# Patient Record
Sex: MALE | Race: OTHER | NOT HISPANIC OR LATINO | URBAN - METROPOLITAN AREA
[De-identification: names, ages, dates, MRNs, and addresses within clinical notes are randomized per-mention and may not be internally consistent; named-entity substitution may affect disease eponyms.]

---

## 2018-04-07 ENCOUNTER — INPATIENT (INPATIENT)
Age: 23
LOS: 11 days | Discharge: HOME | End: 2018-04-19
Attending: HOSPITALIST | Admitting: HOSPITALIST
Payer: MEDICAID

## 2018-04-07 VITALS
WEIGHT: 163.14 LBS | TEMPERATURE: 32 F | SYSTOLIC BLOOD PRESSURE: 132 MMHG | HEART RATE: 80 BPM | RESPIRATION RATE: 20 BRPM | OXYGEN SATURATION: 100 % | DIASTOLIC BLOOD PRESSURE: 82 MMHG

## 2018-04-07 NOTE — ED ADULT TRIAGE NOTE - CHIEF COMPLAINT QUOTE
Pt. brought in by family for intermittent episodes of being unresponsive and witnessed seizure like activity at home as well as worsening neuro symptoms. Per family, pt. began to have right sided facial paralysis, right sided neck/facial pain and swelling one week ago, seen at [facility] and diagnosed with Bell's Palsy, was given Valacyclovir and prednisone, however family states weakness and paralysis has worsened, pt. now unable to eat or swallow, speak, or make eye contact. Family also reports three witnessed episodes of patient "shaking" with his "eyes rolling back into his head" over the past 2-3 days. Pt. with notable right sided facial droop, slurred speech, c/o right mastoid pain, and midsternal chest pain, as well as inability to walk due to weakness. Dr. Bhat called for eval. no stroke code, but pt. to be seen in Main ED. Pt. brought in by family for intermittent episodes of being unresponsive and witnessed seizure like activity at home as well as worsening neuro symptoms. Per family, pt. began to have right sided facial paralysis, right sided neck/facial pain and swelling one week ago, seen at [facility] and diagnosed with Bell's Palsy, was given Valacyclovir and prednisone, however family states weakness and paralysis has worsened, pt. now unable to eat or swallow, speak, or make eye contact. Family also reports three witnessed episodes of patient "shaking" with his "eyes rolling back into his head" over the past 2-3 days. Pt. with notable right sided facial droop, slurred speech, c/o right mastoid pain, severe headache, and midsternal chest pain, as well as inability to walk due to weakness. Dr. Bhat called for eval. no stroke code, but pt. to be seen in Main ED.

## 2018-04-07 NOTE — ED PROVIDER NOTE - OBJECTIVE STATEMENT
22 M no pmh, p/w facial droop, gen weakness, headache and seizure activity. Pt started feeling unwell 1 week ago, developed L sided facial droop, with inability to close R eye. Went to [facility], treated with steroids and acyclovir for presumed bells palsy. However, patient with persistent and worsening diffuse headache and R neck pain which he has not taken anything for. Also decreased Po intake, and drooling from L side of mouth. No change in vision. No fevers. Does endorse recent febrile URI 2 months ago. Denies etoh/drug use.

## 2018-04-07 NOTE — ED PROVIDER NOTE - MEDICAL DECISION MAKING DETAILS
22 M with L facial droop, inability to close R eye, seizure activity. Exam does not follow clear neuro pattern. No infectious symptoms. Given this is 4th ED visit, will check labs, CT head, pain control, neuro c/s

## 2018-04-07 NOTE — ED PROVIDER NOTE - ATTENDING CONTRIBUTION TO CARE
21 y/o M with no significant PMH BIB family for headache.  Pt reports 1 week prior he developed a facial droop.  He was seen at an OSH at the time and diagnosed with Bell's palsy, started on steroids and acyclovir.  Pt reports sxs have worsened since.  He reports a diffuse headache and right ear pain with accompanying droop to left side of mouth and inability to close the right eye.  Pt also has been unable to eat or drink anything and now with generalized weakness x 2 days.  Family reports 3 episodes of generalized tonic-clonic activity followed by a period of unresponsiveness at home.  These episodes lasting seconds to minutes, always return to baseline, no reported injury or trauma.  No fever, chills, rash, weakness to extremities, difficulty breathing.  Pt reports he was treated for a viral infection 2 months prior, but no other recent illnesses.  Pt moved to the  from [facility] in 2014, but denies any recent travel.  (+)sexually active, no h/o STDs or infections.  Pt is lying in stretcher, awake and alert oriented x 3.  AF/VSS.  NCAT, PERRL, pt unable to fully close right eyelid, does not participate with EOM exam, but noted to spontaneously move eyes in all directions.  TM intact bilat with no effusion and normal light reflex.  (+)subjective right facial numbness, (+)L lower facial droop, but tongue is midline and gag reflex is intact.  Neck is supple with no meningismus.  Lungs cta bl.  Cards nl S1/S2, RRR, no MRG.  Abd soft ntnd.  No pedal edema or calf tenderness.  No pronator drift, strength 5/5 in all 4 extremities, sensation is grossly itnact, babinski neg.  Will plan for labs, including tox/lyme/hiv, ct head, IVF, pain meds, neuro consult.  Concern for complex migraine vs primary cns lesion vs tox vs new onset seizure disorder.  while sxs are not consistent with a focal neurologic process, reported new onset seizures are concerning and pt will likely require admission.

## 2018-04-07 NOTE — ED PROVIDER NOTE - PROGRESS NOTE DETAILS
rapid assessment: 22 y/ol male diagnosed with [place] Palsy four days ago at [facility].  Family states that he has had epsiodes of eyes rolling to back of head. He is unable to eat or drink and is stating he has headache.  He is aware of name, place and time. Lungs clear.  Transferred over to adult ED. Jacqueline MUNGUIA Home att: Called to quick look for stroke jose. 22M last nl Tues 4/2. Per friend and father 4/2 patient developed right sided facial droop, facial swelling, progressive worsening of droop, food drips out of mouth and right eye tears. Jose osh, told bell's palsy and discharged. Since Tuesday 2 generalized diaz clonic with post-ictal. Today patient had third tonic clonic of life, confused, "out of it." PE nad, perrl, decr sensation rt cheek (per patient since tues), intact str x 4 extrem, decr sensation rle (since tues). Neg code stroke, ask that patient be placed in main. Safia: neurology consulted Safia: patient seen by neurology, recommend admission for seizure workup. Pt accepted by Dr Padilla

## 2018-04-07 NOTE — ED PROVIDER NOTE - CRANIAL NERVE AND PUPILLARY EXAM
extra-ocular movements intact/L mouth droop, self correcting with distraction, EOMI, tongue midline, decreased sensation to R face, diff closing R eye

## 2018-04-08 DIAGNOSIS — R81 GLYCOSURIA: ICD-10-CM

## 2018-04-08 DIAGNOSIS — R41.89 OTHER SYMPTOMS AND SIGNS INVOLVING COGNITIVE FUNCTIONS AND AWARENESS: ICD-10-CM

## 2018-04-08 DIAGNOSIS — R29.810 FACIAL WEAKNESS: ICD-10-CM

## 2018-04-08 DIAGNOSIS — Z29.9 ENCOUNTER FOR PROPHYLACTIC MEASURES, UNSPECIFIED: ICD-10-CM

## 2018-04-08 DIAGNOSIS — R74.0 NONSPECIFIC ELEVATION OF LEVELS OF TRANSAMINASE AND LACTIC ACID DEHYDROGENASE [LDH]: ICD-10-CM

## 2018-04-08 DIAGNOSIS — D72.829 ELEVATED WHITE BLOOD CELL COUNT, UNSPECIFIED: ICD-10-CM

## 2018-04-08 DIAGNOSIS — R45.89 OTHER SYMPTOMS AND SIGNS INVOLVING EMOTIONAL STATE: ICD-10-CM

## 2018-04-08 LAB
ALBUMIN SERPL ELPH-MCNC: 3.8 G/DL — SIGNIFICANT CHANGE UP (ref 3.3–5)
ALBUMIN SERPL ELPH-MCNC: 4.4 G/DL — SIGNIFICANT CHANGE UP (ref 3.3–5)
ALP SERPL-CCNC: 67 U/L — SIGNIFICANT CHANGE UP (ref 40–120)
ALP SERPL-CCNC: 72 U/L — SIGNIFICANT CHANGE UP (ref 40–120)
ALT FLD-CCNC: 48 U/L — HIGH (ref 4–41)
ALT FLD-CCNC: 55 U/L — HIGH (ref 4–41)
AMPHET UR-MCNC: NEGATIVE — SIGNIFICANT CHANGE UP
APAP SERPL-MCNC: < 15 UG/ML — LOW (ref 15–25)
APPEARANCE UR: CLEAR — SIGNIFICANT CHANGE UP
AST SERPL-CCNC: 28 U/L — SIGNIFICANT CHANGE UP (ref 4–40)
AST SERPL-CCNC: 30 U/L — SIGNIFICANT CHANGE UP (ref 4–40)
BARBITURATES UR SCN-MCNC: POSITIVE — SIGNIFICANT CHANGE UP
BASE EXCESS BLDV CALC-SCNC: 4.2 MMOL/L — SIGNIFICANT CHANGE UP
BASOPHILS # BLD AUTO: 0.01 K/UL — SIGNIFICANT CHANGE UP (ref 0–0.2)
BASOPHILS # BLD AUTO: 0.02 K/UL — SIGNIFICANT CHANGE UP (ref 0–0.2)
BASOPHILS NFR BLD AUTO: 0.1 % — SIGNIFICANT CHANGE UP (ref 0–2)
BASOPHILS NFR BLD AUTO: 0.2 % — SIGNIFICANT CHANGE UP (ref 0–2)
BENZODIAZ UR-MCNC: NEGATIVE — SIGNIFICANT CHANGE UP
BILIRUB SERPL-MCNC: 0.3 MG/DL — SIGNIFICANT CHANGE UP (ref 0.2–1.2)
BILIRUB SERPL-MCNC: 0.3 MG/DL — SIGNIFICANT CHANGE UP (ref 0.2–1.2)
BILIRUB UR-MCNC: NEGATIVE — SIGNIFICANT CHANGE UP
BLOOD GAS VENOUS - CREATININE: 0.94 MG/DL — SIGNIFICANT CHANGE UP (ref 0.5–1.3)
BLOOD UR QL VISUAL: NEGATIVE — SIGNIFICANT CHANGE UP
BUN SERPL-MCNC: 10 MG/DL — SIGNIFICANT CHANGE UP (ref 7–23)
BUN SERPL-MCNC: 10 MG/DL — SIGNIFICANT CHANGE UP (ref 7–23)
CALCIUM SERPL-MCNC: 8.4 MG/DL — SIGNIFICANT CHANGE UP (ref 8.4–10.5)
CALCIUM SERPL-MCNC: 9.2 MG/DL — SIGNIFICANT CHANGE UP (ref 8.4–10.5)
CANNABINOIDS UR-MCNC: NEGATIVE — SIGNIFICANT CHANGE UP
CHLORIDE BLDV-SCNC: 107 MMOL/L — SIGNIFICANT CHANGE UP (ref 96–108)
CHLORIDE SERPL-SCNC: 102 MMOL/L — SIGNIFICANT CHANGE UP (ref 98–107)
CHLORIDE SERPL-SCNC: 104 MMOL/L — SIGNIFICANT CHANGE UP (ref 98–107)
CK SERPL-CCNC: 103 U/L — SIGNIFICANT CHANGE UP (ref 30–200)
CO2 SERPL-SCNC: 27 MMOL/L — SIGNIFICANT CHANGE UP (ref 22–31)
CO2 SERPL-SCNC: 27 MMOL/L — SIGNIFICANT CHANGE UP (ref 22–31)
COCAINE METAB.OTHER UR-MCNC: NEGATIVE — SIGNIFICANT CHANGE UP
COLOR SPEC: SIGNIFICANT CHANGE UP
CREAT SERPL-MCNC: 0.98 MG/DL — SIGNIFICANT CHANGE UP (ref 0.5–1.3)
CREAT SERPL-MCNC: 0.99 MG/DL — SIGNIFICANT CHANGE UP (ref 0.5–1.3)
EOSINOPHIL # BLD AUTO: 0.01 K/UL — SIGNIFICANT CHANGE UP (ref 0–0.5)
EOSINOPHIL # BLD AUTO: 0.04 K/UL — SIGNIFICANT CHANGE UP (ref 0–0.5)
EOSINOPHIL NFR BLD AUTO: 0.1 % — SIGNIFICANT CHANGE UP (ref 0–6)
EOSINOPHIL NFR BLD AUTO: 0.4 % — SIGNIFICANT CHANGE UP (ref 0–6)
ETHANOL BLD-MCNC: < 10 MG/DL — SIGNIFICANT CHANGE UP
GAS PNL BLDV: 139 MMOL/L — SIGNIFICANT CHANGE UP (ref 136–146)
GLUCOSE BLDV-MCNC: 85 — SIGNIFICANT CHANGE UP (ref 70–99)
GLUCOSE SERPL-MCNC: 86 MG/DL — SIGNIFICANT CHANGE UP (ref 70–99)
GLUCOSE SERPL-MCNC: 87 MG/DL — SIGNIFICANT CHANGE UP (ref 70–99)
GLUCOSE UR-MCNC: SIGNIFICANT CHANGE UP
HBA1C BLD-MCNC: 6.1 % — HIGH (ref 4–5.6)
HCO3 BLDV-SCNC: 26 MMOL/L — SIGNIFICANT CHANGE UP (ref 20–27)
HCT VFR BLD CALC: 43.6 % — SIGNIFICANT CHANGE UP (ref 39–50)
HCT VFR BLD CALC: 46.2 % — SIGNIFICANT CHANGE UP (ref 39–50)
HCT VFR BLDV CALC: 49.1 % — SIGNIFICANT CHANGE UP (ref 39–51)
HGB BLD-MCNC: 14.2 G/DL — SIGNIFICANT CHANGE UP (ref 13–17)
HGB BLD-MCNC: 15.2 G/DL — SIGNIFICANT CHANGE UP (ref 13–17)
HGB BLDV-MCNC: 16 G/DL — SIGNIFICANT CHANGE UP (ref 13–17)
HIV1 AG SER QL: SIGNIFICANT CHANGE UP
HIV1+2 AB SPEC QL: SIGNIFICANT CHANGE UP
IMM GRANULOCYTES # BLD AUTO: 0.03 # — SIGNIFICANT CHANGE UP
IMM GRANULOCYTES # BLD AUTO: 0.03 # — SIGNIFICANT CHANGE UP
IMM GRANULOCYTES NFR BLD AUTO: 0.2 % — SIGNIFICANT CHANGE UP (ref 0–1.5)
IMM GRANULOCYTES NFR BLD AUTO: 0.3 % — SIGNIFICANT CHANGE UP (ref 0–1.5)
KETONES UR-MCNC: NEGATIVE — SIGNIFICANT CHANGE UP
LACTATE BLDV-MCNC: 1.3 MMOL/L — SIGNIFICANT CHANGE UP (ref 0.5–2)
LEUKOCYTE ESTERASE UR-ACNC: SIGNIFICANT CHANGE UP
LYMPHOCYTES # BLD AUTO: 2.86 K/UL — SIGNIFICANT CHANGE UP (ref 1–3.3)
LYMPHOCYTES # BLD AUTO: 22.8 % — SIGNIFICANT CHANGE UP (ref 13–44)
LYMPHOCYTES # BLD AUTO: 36.1 % — SIGNIFICANT CHANGE UP (ref 13–44)
LYMPHOCYTES # BLD AUTO: 4.06 K/UL — HIGH (ref 1–3.3)
MAGNESIUM SERPL-MCNC: 2 MG/DL — SIGNIFICANT CHANGE UP (ref 1.6–2.6)
MCHC RBC-ENTMCNC: 29.2 PG — SIGNIFICANT CHANGE UP (ref 27–34)
MCHC RBC-ENTMCNC: 29.2 PG — SIGNIFICANT CHANGE UP (ref 27–34)
MCHC RBC-ENTMCNC: 32.6 % — SIGNIFICANT CHANGE UP (ref 32–36)
MCHC RBC-ENTMCNC: 32.9 % — SIGNIFICANT CHANGE UP (ref 32–36)
MCV RBC AUTO: 88.8 FL — SIGNIFICANT CHANGE UP (ref 80–100)
MCV RBC AUTO: 89.5 FL — SIGNIFICANT CHANGE UP (ref 80–100)
METHADONE UR-MCNC: NEGATIVE — SIGNIFICANT CHANGE UP
MONOCYTES # BLD AUTO: 1.01 K/UL — HIGH (ref 0–0.9)
MONOCYTES # BLD AUTO: 1.2 K/UL — HIGH (ref 0–0.9)
MONOCYTES NFR BLD AUTO: 9 % — SIGNIFICANT CHANGE UP (ref 2–14)
MONOCYTES NFR BLD AUTO: 9.6 % — SIGNIFICANT CHANGE UP (ref 2–14)
MUCOUS THREADS # UR AUTO: SIGNIFICANT CHANGE UP
NEUTROPHILS # BLD AUTO: 6.08 K/UL — SIGNIFICANT CHANGE UP (ref 1.8–7.4)
NEUTROPHILS # BLD AUTO: 8.45 K/UL — HIGH (ref 1.8–7.4)
NEUTROPHILS NFR BLD AUTO: 54 % — SIGNIFICANT CHANGE UP (ref 43–77)
NEUTROPHILS NFR BLD AUTO: 67.2 % — SIGNIFICANT CHANGE UP (ref 43–77)
NITRITE UR-MCNC: NEGATIVE — SIGNIFICANT CHANGE UP
NON-SQ EPI CELLS # UR AUTO: <1 — SIGNIFICANT CHANGE UP
NRBC # FLD: 0 — SIGNIFICANT CHANGE UP
NRBC # FLD: 0 — SIGNIFICANT CHANGE UP
OPIATES UR-MCNC: NEGATIVE — SIGNIFICANT CHANGE UP
OXYCODONE UR-MCNC: NEGATIVE — SIGNIFICANT CHANGE UP
PCO2 BLDV: 50 MMHG — SIGNIFICANT CHANGE UP (ref 41–51)
PCP UR-MCNC: NEGATIVE — SIGNIFICANT CHANGE UP
PH BLDV: 7.38 PH — SIGNIFICANT CHANGE UP (ref 7.32–7.43)
PH UR: 6.5 — SIGNIFICANT CHANGE UP (ref 4.6–8)
PHOSPHATE SERPL-MCNC: 2.7 MG/DL — SIGNIFICANT CHANGE UP (ref 2.5–4.5)
PLATELET # BLD AUTO: 221 K/UL — SIGNIFICANT CHANGE UP (ref 150–400)
PLATELET # BLD AUTO: 241 K/UL — SIGNIFICANT CHANGE UP (ref 150–400)
PMV BLD: 11.5 FL — SIGNIFICANT CHANGE UP (ref 7–13)
PMV BLD: 11.8 FL — SIGNIFICANT CHANGE UP (ref 7–13)
PO2 BLDV: 32 MMHG — LOW (ref 35–40)
POTASSIUM BLDV-SCNC: 3.9 MMOL/L — SIGNIFICANT CHANGE UP (ref 3.4–4.5)
POTASSIUM SERPL-MCNC: 3.8 MMOL/L — SIGNIFICANT CHANGE UP (ref 3.5–5.3)
POTASSIUM SERPL-MCNC: 4.1 MMOL/L — SIGNIFICANT CHANGE UP (ref 3.5–5.3)
POTASSIUM SERPL-SCNC: 3.8 MMOL/L — SIGNIFICANT CHANGE UP (ref 3.5–5.3)
POTASSIUM SERPL-SCNC: 4.1 MMOL/L — SIGNIFICANT CHANGE UP (ref 3.5–5.3)
PROT SERPL-MCNC: 6.3 G/DL — SIGNIFICANT CHANGE UP (ref 6–8.3)
PROT SERPL-MCNC: 7.1 G/DL — SIGNIFICANT CHANGE UP (ref 6–8.3)
PROT UR-MCNC: NEGATIVE MG/DL — SIGNIFICANT CHANGE UP
RBC # BLD: 4.87 M/UL — SIGNIFICANT CHANGE UP (ref 4.2–5.8)
RBC # BLD: 5.2 M/UL — SIGNIFICANT CHANGE UP (ref 4.2–5.8)
RBC # FLD: 11.7 % — SIGNIFICANT CHANGE UP (ref 10.3–14.5)
RBC # FLD: 11.8 % — SIGNIFICANT CHANGE UP (ref 10.3–14.5)
RBC CASTS # UR COMP ASSIST: HIGH (ref 0–?)
SALICYLATES SERPL-MCNC: < 5 MG/DL — LOW (ref 15–30)
SAO2 % BLDV: 56.7 % — LOW (ref 60–85)
SODIUM SERPL-SCNC: 141 MMOL/L — SIGNIFICANT CHANGE UP (ref 135–145)
SODIUM SERPL-SCNC: 143 MMOL/L — SIGNIFICANT CHANGE UP (ref 135–145)
SP GR SPEC: 1.01 — SIGNIFICANT CHANGE UP (ref 1–1.04)
T4 AB SER-ACNC: 8.07 UG/DL — SIGNIFICANT CHANGE UP (ref 5.1–13)
TSH SERPL-MCNC: 5.46 UIU/ML — HIGH (ref 0.27–4.2)
UROBILINOGEN FLD QL: 1 MG/DL — SIGNIFICANT CHANGE UP
WBC # BLD: 11.24 K/UL — HIGH (ref 3.8–10.5)
WBC # BLD: 12.56 K/UL — HIGH (ref 3.8–10.5)
WBC # FLD AUTO: 11.24 K/UL — HIGH (ref 3.8–10.5)
WBC # FLD AUTO: 12.56 K/UL — HIGH (ref 3.8–10.5)
WBC UR QL: SIGNIFICANT CHANGE UP (ref 0–?)

## 2018-04-08 PROCEDURE — 12345: CPT | Mod: NC

## 2018-04-08 PROCEDURE — 70450 CT HEAD/BRAIN W/O DYE: CPT | Mod: 26

## 2018-04-08 PROCEDURE — 70551 MRI BRAIN STEM W/O DYE: CPT | Mod: 26

## 2018-04-08 PROCEDURE — 99223 1ST HOSP IP/OBS HIGH 75: CPT | Mod: GC

## 2018-04-08 RX ORDER — METOCLOPRAMIDE HCL 10 MG
10 TABLET ORAL ONCE
Qty: 0 | Refills: 0 | Status: COMPLETED | OUTPATIENT
Start: 2018-04-08 | End: 2018-04-08

## 2018-04-08 RX ORDER — ACETAMINOPHEN 500 MG
1000 TABLET ORAL ONCE
Qty: 0 | Refills: 0 | Status: COMPLETED | OUTPATIENT
Start: 2018-04-08 | End: 2018-04-08

## 2018-04-08 RX ORDER — TRAMADOL HYDROCHLORIDE 50 MG/1
25 TABLET ORAL EVERY 6 HOURS
Qty: 0 | Refills: 0 | Status: DISCONTINUED | OUTPATIENT
Start: 2018-04-08 | End: 2018-04-15

## 2018-04-08 RX ORDER — ACETAMINOPHEN 500 MG
650 TABLET ORAL EVERY 6 HOURS
Qty: 0 | Refills: 0 | Status: DISCONTINUED | OUTPATIENT
Start: 2018-04-08 | End: 2018-04-08

## 2018-04-08 RX ORDER — VALACYCLOVIR 500 MG/1
1000 TABLET, FILM COATED ORAL THREE TIMES A DAY
Qty: 0 | Refills: 0 | Status: COMPLETED | OUTPATIENT
Start: 2018-04-08 | End: 2018-04-11

## 2018-04-08 RX ORDER — SODIUM CHLORIDE 9 MG/ML
1000 INJECTION INTRAMUSCULAR; INTRAVENOUS; SUBCUTANEOUS ONCE
Qty: 0 | Refills: 0 | Status: COMPLETED | OUTPATIENT
Start: 2018-04-08 | End: 2018-04-08

## 2018-04-08 RX ORDER — ACETAMINOPHEN 500 MG
650 TABLET ORAL EVERY 6 HOURS
Qty: 0 | Refills: 0 | Status: DISCONTINUED | OUTPATIENT
Start: 2018-04-08 | End: 2018-04-19

## 2018-04-08 RX ADMIN — Medication 1000 MILLIGRAM(S): at 02:40

## 2018-04-08 RX ADMIN — VALACYCLOVIR 1000 MILLIGRAM(S): 500 TABLET, FILM COATED ORAL at 15:10

## 2018-04-08 RX ADMIN — TRAMADOL HYDROCHLORIDE 25 MILLIGRAM(S): 50 TABLET ORAL at 08:36

## 2018-04-08 RX ADMIN — Medication 400 MILLIGRAM(S): at 02:10

## 2018-04-08 RX ADMIN — Medication 650 MILLIGRAM(S): at 08:11

## 2018-04-08 RX ADMIN — Medication 650 MILLIGRAM(S): at 13:50

## 2018-04-08 RX ADMIN — TRAMADOL HYDROCHLORIDE 25 MILLIGRAM(S): 50 TABLET ORAL at 09:30

## 2018-04-08 RX ADMIN — Medication 1 DROP(S): at 18:00

## 2018-04-08 RX ADMIN — Medication 1 DROP(S): at 15:10

## 2018-04-08 RX ADMIN — Medication 650 MILLIGRAM(S): at 14:45

## 2018-04-08 RX ADMIN — Medication 650 MILLIGRAM(S): at 07:11

## 2018-04-08 RX ADMIN — Medication 40 MILLIGRAM(S): at 07:11

## 2018-04-08 RX ADMIN — Medication 10 MILLIGRAM(S): at 02:10

## 2018-04-08 RX ADMIN — SODIUM CHLORIDE 1000 MILLILITER(S): 9 INJECTION INTRAMUSCULAR; INTRAVENOUS; SUBCUTANEOUS at 02:10

## 2018-04-08 RX ADMIN — TRAMADOL HYDROCHLORIDE 25 MILLIGRAM(S): 50 TABLET ORAL at 21:06

## 2018-04-08 RX ADMIN — TRAMADOL HYDROCHLORIDE 25 MILLIGRAM(S): 50 TABLET ORAL at 22:06

## 2018-04-08 NOTE — PROGRESS NOTE ADULT - PROBLEM SELECTOR PLAN 4
- Patient noted to have a leukocytosis on presentation (WBC = 12.56)  - no signs of infection  - likely in setting of steroid use   - Monitor CBC

## 2018-04-08 NOTE — PROGRESS NOTE ADULT - PROBLEM SELECTOR PLAN 2
- Patient reported to have 3 episodes of whole-body shaking followed by unresponsiveness over the past 2 days lasting 20-25 seconds  - no evidence of inconitnence or tongue biting  -  Will check a vEEG for signs of epileptiform changes  - Will check Brain MRI as noted above  - neuro recs appreciated - Patient reported to have 3 episodes of whole-body shaking followed by unresponsiveness over the past 2 days lasting 20-25 seconds  - patient reports he remembers 1 episode of shaking in which he felt unwell and felt he was febrile  - no evidence of incontinence or tongue biting  - Will check a vEEG for signs of epileptiform changes  - Will check Brain MRI as noted above  - neuro recs appreciated  - possible pseuodseizure

## 2018-04-08 NOTE — PROGRESS NOTE ADULT - ASSESSMENT
22M with no PMHx who presents to the ED with L-sided facial droop possibly 2/2 Bell's Palsy vs conversion disorder, also with recent seizure-like activity concerning for epilepsy vs pseudoseizure.

## 2018-04-08 NOTE — H&P ADULT - PROBLEM SELECTOR PLAN 5
- DVT PPx: Holding pharmacologic AC as patient is ambulatory and is a low risk for DVT. - Patient noted to have an elevated ALT (55) on presentation. Patient endorses some nausea, but no other abdominal complaints. Unclear etiology at this time.  - Will monitor. If LFTs were to worsen, will check RUQ U/S

## 2018-04-08 NOTE — H&P ADULT - PROBLEM SELECTOR PLAN 4
- Patient noted to have an elevated ALT (55) on presentation. Patient endorses some nausea, but no other abdominal complaints. Unclear etiology at this time.  - Will monitor. If LFTs were to worsen, will check RUQ U/S - Patient noted to have a leukocytosis on presentation (WBC = 12.56). Suspect WBC elevation is 2/2 recent steroid use. Patient is currently afebrile and without signs/symptoms of infection. Will monitor off antibiotics for now  - Monitor CBC

## 2018-04-08 NOTE — CONSULT NOTE ADULT - ASSESSMENT
22 M no pmh, p/w for eval of facial droop, gen weakness, headache and seizure activity. As per patient, he started feeling unwell 1 week ago, developed L sided facial droop, with inability to close R eye. Went to [facility], treated with steroids and acyclovir for presumed bells palsy. Patient also c/o of numbness on right sided of the body along with right side face. As per family members, he had 3 seizures during last week, each episodes lasting for about 15-20 seconds. No h/o of tongue biting, urine/stool incontinence. Uncle noticed one seizure like episodes and he mentioned that his head and upper body were shaking more compare to lower body. His headache is intermittent, occipital and resolved quickly without intervention. His symptoms of unable to close his right eye and facial droop on left disappeared during distracting him for certain task. Neurology examination was unremarkable as deficits were absent.     Impression     Conversion disorder vs Factitious vs Malingering for  facial symptoms    Seizure like activity could be Epilepsy vs Pseudoseizure     Plan     Considering not able to witness any seizure like activity during ED presentation, I would recommend inpatient VEEG monitoring for 24 hours and MRI brain.   Psych consult once we definitely r/o Epilepsy (after VEEG and MRI brain)   No need for AEDs.   Avoid benzo for seizure like activity, give only if lasts more than 3 minutes

## 2018-04-08 NOTE — ED ADULT NURSE NOTE - CHPI ED SYMPTOMS NEG
no change in level of consciousness/no blurred vision/no weakness/no dizziness/no nausea/no numbness/no vomiting/no confusion/no fever

## 2018-04-08 NOTE — H&P ADULT - NSHPSOCIALHISTORY_GEN_ALL_CORE
Denies smoking, EtOH, or illicit drug use  Lives at home with Aunt and cousins  Works as an [employer]   Originally from [facility] [place], [place] to [place] in [place]

## 2018-04-08 NOTE — H&P ADULT - PROBLEM SELECTOR PLAN 2
- Patient reported to have 3 episodes of whole-body shaking followed by unresponsiveness. No associated tongue biting or bowel/urinary incontinence. Given variety of neurologic symptoms reported, concern for possible epilepsy  - Will check a vEEG  - Will check Brain MRI as noted above  - Follow-up Neuro recs - Patient reported to have 3 episodes of whole-body shaking followed by unresponsiveness over the past 2 days. No associated tongue biting or bowel/urinary incontinence. Given variety of neurologic symptoms reported, concern for possible epilepsy  - Will check a vEEG  - Will check Brain MRI as noted above  - Follow-up Neuro recs - Patient reported to have 3 episodes of whole-body shaking followed by unresponsiveness over the past 2 days. (Family thinks shaking somewhat similar to that demonstrated during fever).  No associated tongue biting or bowel/urinary incontinence. Given variety of neurologic symptoms reported, concern for possible epilepsy  - Will check a vEEG  - Will check Brain MRI as noted above  - Follow-up Neuro recs

## 2018-04-08 NOTE — PATIENT PROFILE ADULT. - FUNCTIONAL SCREEN CURRENT LEVEL: COMMUNICATION, MLM
(2) difficulty speaking (not related to language barrier) Pt has partial face paralysis/(2) difficulty speaking (not related to language barrier)

## 2018-04-08 NOTE — H&P ADULT - HISTORY OF PRESENT ILLNESS
Patient is a 23 y/o M w/ no significant PMHx who presents to the ED with L-sided facial droop and recent seizure-like activity. Patient is accompanied by his uncle at bedside who helped provide history. Patient reports that he was in his usual state of until 4-days ago when he developed sudden-onset L-sided facial droop as well as inability to close his R eye. He reports that he was unable to eat/drink due to his droop which concerned him and so he went to [facility] the following day for further evaluation. Patient was treated with Prednisone and Valtrex for presumed Bell's Palsy and he was discharged home. After discharge, patient remained concerned about his symptoms, and he ultimately went to [facility] 2 separate times for further evaluation. He was presumed to have Bell's Palsy at [facility] as well and was given the same treatment. Patient reports that over the past 2 days, he has been having a severe, diffuse headache as well has R neck pain. He reports numbness on the R side of his face as well. In addition, per patient's uncle, patient has had 3 separate episodes of seizure-like activity over the past 2 days. Patient reportedly shook for 20-25 seconds which was followed a brief episode of unresponsiveness. No reported bowel/urinary incontinence or tongue biting during these episodes. Since patient's symptoms appeared to be worsening, he came to [facility] for further evaluation. Patient states that he had a fever and cough ~ 2 months ago. He explains that he was given 2 antibiotics by his PMD at that time, but he is unsure what those antibiotics were. No further episodes of fever. On ROS, patient endorses poor PO intake, occasional nausea, and intermittent "burning chest pain," but denies any shortness of breath, vomiting, diarrhea, dysuria, or rash. No recent sick contacts.    In the ED, patient's VS were: T = 98.4F, HR = 90, BP = 120/75, RR = 16, O2 Sat = 99% on RA. Labs were significant for a leukocytosis of 12.56, ALT = 55, lactate of 1.3, UTox + for barbiturates, and a negative HIV screen. Head CT showed no acute pathology. Patient was given 1L NS, IV Tylenol 1g x1, and IV Reglan 10mg x1. He was then admitted to Medicine for further management. Patient is a 21 y/o M w/ no significant PMHx who presents to the ED with L-sided facial droop and recent seizure-like activity. Patient is accompanied by his uncle at bedside who helped provide history. Patient reports that he was in his usual state of until 4-days ago when he developed sudden-onset L-sided facial droop as well as inability to close his R eye. He reports that he was unable to eat/drink due to his droop which concerned him and so he went to [facility] the following day for further evaluation. Patient was treated with Prednisone and Valtrex for presumed Bell's Palsy and he was discharged home. After discharge, patient remained concerned about his symptoms, and he ultimately went to [facility] 2 separate times for further evaluation. He was presumed to have Bell's Palsy at [facility] as well and was given the same treatment. Patient reports that over the past 2 days, he has been having a severe, diffuse headache as well has R neck pain. He reports numbness on the R side of his face as well. In addition, per patient's uncle, patient has had 3 separate episodes of seizure-like activity over the past 2 days. Patient's whole body reportedly shook for 20-25 seconds each time which was followed a brief episode of unresponsiveness. No reported bowel/urinary incontinence or tongue biting during these episodes. Since patient's symptoms appeared to be worsening, he came to [facility] for further evaluation. Patient states that he had a fever and cough ~ 2 months ago. He explains that he was given 2 antibiotics by his PMD at that time, but he is unsure what those antibiotics were. No further episodes of fever. On ROS, patient endorses poor PO intake, occasional nausea, and intermittent "burning chest pain," but denies any shortness of breath, vomiting, diarrhea, dysuria, or rash. No recent sick contacts.    In the ED, patient's VS were: T = 98.4F, HR = 90, BP = 120/75, RR = 16, O2 Sat = 99% on RA. Labs were significant for a leukocytosis of 12.56, ALT = 55, lactate of 1.3, UTox + for barbiturates, and a negative HIV screen. Head CT showed no acute pathology. Patient was given 1L NS, IV Tylenol 1g x1, and IV Reglan 10mg x1. He was then admitted to Medicine for further management.

## 2018-04-08 NOTE — PROGRESS NOTE ADULT - PROBLEM SELECTOR PLAN 1
- presents with  4-day history of sudden-onset L facial droop a/w R-facial numbness, inability to close R-eye, and severe headache.  - s/p evaluation at [facility] and [place] and was started on Prednisone/Valtrex for presumed Bell's Palsy  - CT at [facility] is negative  - per neurology- likely conversion disorder, however will need to rule out organic etiology  - will obtain MRI and video EEG  - patient's facial deficits disappeared when he was distracted   - Will c/w Prednisone and Valtrex for now pending Brain MRI  - U-tox for barbiturates positivity may be false positive in the setting of NSAIDs at home; motrin, ibuprofen  - Follow-up with Neurology in the AM. - presents with  4-day history of sudden-onset L facial droop a/w R-facial numbness, inability to close R-eye, and severe headache.  - s/p evaluation at [facility] and [place] and was started on Prednisone/Valtrex for presumed Bell's Palsy  - CTH is negative  - per neurology- likely conversion disorder, however will need to rule out organic etiology  - will obtain MRI and video EEG  - patient's facial deficits disappeared when he was distracted   - Will c/w Prednisone and Valtrex for now pending Brain MRI  - U-tox for barbiturates positivity may be false positive in the setting of NSAIDs at home; motrin, ibuprofen  - Follow-up with Neurology

## 2018-04-08 NOTE — CONSULT NOTE ADULT - SUBJECTIVE AND OBJECTIVE BOX
HPI:    22 M no pmh, p/w for eval of facial droop, gen weakness, headache and seizure activity. As per patient, he started feeling unwell 1 week ago, developed L sided facial droop, with inability to close R eye. Went to [facility], treated with steroids and acyclovir for presumed bells palsy. However, patient with persistent and worsening diffuse headache and R neck pain which he has not taken anything for. No change in vision. No fevers. Does endorse recent febrile URI 2 months ago. Denies etoh/drug use. Patient also c/o of numbness on right sided of the body along with right side face. As per family members, he had 3 seizures during last week, each episodes lasting for about 15-20 seconds. No h/o of tongue biting, urine/stool incontinence. Uncle noticed one seizure like episodes and he mentioned that his head and upper body were shaking more compare to lower body. His headache is intermittent, occipital and resolved quickly without intervention. His symptoms of unable to close his right eye and facial droop on left disappeared during distracting him for certain task.            MEDICATIONS  (STANDING):  acetaminophen  IVPB. 1000 milliGRAM(s) IV Intermittent once  metoclopramide Injectable 10 milliGRAM(s) IV Push once  sodium chloride 0.9% Bolus 1000 milliLiter(s) IV Bolus once    MEDICATIONS  (PRN):      PAST MEDICAL & SURGICAL HISTORY:  No pertinent past medical history  No significant past surgical history      FAMILY HISTORY:      Allergies    No Known Allergies    Intolerances          SHx - No smoking, No ETOH, No drug abuse      Review of Systems:  CONSTITUTIONAL:  No weight loss, fever, chills, weakness or fatigue.  HEENT:  Eyes:  No visual loss, blurred vision, double vision or yellow sclerae. Ears, Nose, Throat:  No hearing loss, sneezing, congestion, runny nose or sore throat.  SKIN:  No rash or itching.  CARDIOVASCULAR:  No chest pain, chest pressure or chest discomfort. No palpitations or edema.  RESPIRATORY:  No shortness of breath, cough or sputum.  GASTROINTESTINAL:  No anorexia, nausea, vomiting or diarrhea. No abdominal pain or blood.  GENITOURINARY:  NO Burning on urination.   NEUROLOGICAL: See HPI  MUSCULOSKELETAL:  No muscle, back pain, joint pain or stiffness.  HEMATOLOGIC:  No anemia, bleeding or bruising.  LYMPHATICS:  No enlarged nodes. No history of splenectomy.  PSYCHIATRIC:  No history of depression or anxiety.  ENDOCRINOLOGIC:  No reports of sweating, cold or heat intolerance. No polyuria or polydipsia.  ALLERGIES:  No history of asthma, hives, eczema or rhinitis.        Vital Signs Last 24 Hrs  T(C): 36.9 (07 Apr 2018 23:35), Max: 36.9 (07 Apr 2018 23:35)  T(F): 98.4 (07 Apr 2018 23:35), Max: 98.4 (07 Apr 2018 23:35)  HR: 90 (07 Apr 2018 23:35) (80 - 90)  BP: 120/75 (07 Apr 2018 23:35) (120/75 - 132/82)  BP(mean): --  RR: 16 (07 Apr 2018 23:35) (16 - 20)  SpO2: 99% (07 Apr 2018 23:35) (99% - 100%)    General Exam:   General appearance: No acute distress                   Neurological Exam:    Mental Status: Orientated to self, date and place.  Attention intact.  No dysarthria, aphasia or neglect.  Cranial Nerves: PERRL, EOMI, subjective numbness on right side- but also felt vibration less on right (conducted via bone and should be equal on both side). facial asymmetry - disappear during distraction , Tongue, uvula and palate midline.    Motor:   Tone: normal.                  Strength:   intact   Pronator drift: none                 Dysmetria: None to finger-nose-finger  No truncal ataxia.    Tremor: No resting, postural or action tremor,  No myoclonus.  Sensation: intact to light touch b/l   Deep Tendon Reflexes: 2+ bilateral biceps, triceps, brachioradialis, knee and ankle  Toes flexor bilaterally  Gait: normal and stable.      Other:                          15.2   12.56 )-----------( 241      ( 08 Apr 2018 01:25 )             46.2       Radiology    CT head     < from: CT Head No Cont (04.08.18 @ 01:02) >  IMPRESSION:   No CT evidence of acute intracranial hemorrhage, mass or infarct.     < end of copied text >

## 2018-04-08 NOTE — PROGRESS NOTE ADULT - PROBLEM SELECTOR PLAN 6
- Trace glucose in urine  - On steroid PTA for suspected Bell's Palsy; continued  - Family history of diabetes mellitus (both parents)  - f/u HgbA1c level in the AM - Trace glucose in urine  - On steroid PTA for suspected Bell's Palsy; continued  - Family history of diabetes mellitus (both parents)  - A1c 6.1

## 2018-04-08 NOTE — H&P ADULT - NSHPPHYSICALEXAM_GEN_ALL_CORE
Vital Signs Last 24 Hrs  T(C): 36.7 (08 Apr 2018 02:19), Max: 36.9 (07 Apr 2018 23:35)  T(F): 98.1 (08 Apr 2018 02:19), Max: 98.4 (07 Apr 2018 23:35)  HR: 85 (08 Apr 2018 03:27) (65 - 90)  BP: 121/67 (08 Apr 2018 03:27) (120/75 - 132/82)  BP(mean): --  RR: 16 (08 Apr 2018 03:27) (16 - 20)  SpO2: 100% (08 Apr 2018 03:27) (99% - 100%)    PHYSICAL EXAM:  Constitutional:  Eyes:  ENMT:  Neck:  Breasts:  Back:  Respiratory:  Cardiovascular:  Gastrointestinal:  Genitourinary:  Rectal:  Extremities:  Vascular:  Neurological:  Skin:  Lymph Nodes:  Musculoskeletal:  Psychiatric: Vital Signs Last 24 Hrs  T(C): 36.7 (08 Apr 2018 02:19), Max: 36.9 (07 Apr 2018 23:35)  T(F): 98.1 (08 Apr 2018 02:19), Max: 98.4 (07 Apr 2018 23:35)  HR: 85 (08 Apr 2018 03:27) (65 - 90)  BP: 121/67 (08 Apr 2018 03:27) (120/75 - 132/82)  BP(mean): --  RR: 16 (08 Apr 2018 03:27) (16 - 20)  SpO2: 100% (08 Apr 2018 03:27) (99% - 100%)    PHYSICAL EXAM:  Constitutional: NAD  HEENT: NC/AT, Slightly dry mucous membranes  Neck: Supple  Respiratory: CTAB; No wheeze or rhonchi appreciated  Cardiovascular: RRR; +S1/S2  Gastrointestinal: +BS, Soft, NT, No rigidity  Genitourinary: No [place], No suprapubic TTP  Extremities: No peripheral edema  Neurological: A+Ox3, L-sided facial droop which would improve somewhat when patient was distracted, PERRL, EOMI, Good finger-to-nose bilaterally, Moves all extremities, Subjective decreased sensation of R face, RUE, and RLE compared to L side  Skin: Warm and dry  Psychiatric: Flat affect

## 2018-04-08 NOTE — H&P ADULT - NSHPLABSRESULTS_GEN_ALL_CORE
CBC Full  -  ( 2018 01:25 )  WBC Count : 12.56 K/uL  Hemoglobin : 15.2 g/dL  Hematocrit : 46.2 %  Platelet Count - Automated : 241 K/uL  Mean Cell Volume : 88.8 fL  Mean Cell Hemoglobin : 29.2 pg  Mean Cell Hemoglobin Concentration : 32.9 %  Auto Neutrophil # : 8.45 K/uL  Auto Lymphocyte # : 2.86 K/uL  Auto Monocyte # : 1.20 K/uL  Auto Eosinophil # : 0.01 K/uL  Auto Basophil # : 0.01 K/uL  Auto Neutrophil % : 67.2 %  Auto Lymphocyte % : 22.8 %  Auto Monocyte % : 9.6 %  Auto Eosinophil % : 0.1 %  Auto Basophil % : 0.1 %        141  |  102  |  10  ----------------------------<  87  4.1   |  27  |  0.98    Ca    9.2      2018 01:25    TPro  7.1  /  Alb  4.4  /  TBili  0.3  /  DBili  x   /  AST  30  /  ALT  55<H>  /  AlkPhos  72  04-08    Urinalysis Basic - ( 2018 02:00 )    Color: PLYEL / Appearance: CLEAR / S.015 / pH: 6.5  Gluc: TRACE / Ketone: NEGATIVE  / Bili: NEGATIVE / Urobili: 1 mg/dL   Blood: NEGATIVE / Protein: NEGATIVE mg/dL / Nitrite: NEGATIVE   Leuk Esterase: TRACE / RBC: 5-10 / WBC 2-5   Sq Epi: x / Non Sq Epi: x / Bacteria: x    EKG: NSR (Rate = 63bpm), NL Axis, No acute ST changes, QT/QTc = 384/392    Head CT: No CT evidence of acute intracranial hemorrhage, mass or infarct.

## 2018-04-08 NOTE — H&P ADULT - ASSESSMENT
Patient is a 23 y/o M w/ no significant PMHx who presents to the ED with L-sided facial droop possibly 2/2 Bell's Palsy vs conversion disorder, also with recent seizure-like activity concerning for epilepsy vs pseudoseizure.

## 2018-04-08 NOTE — ED ADULT NURSE NOTE - CHIEF COMPLAINT QUOTE
Pt. brought in by family for intermittent episodes of being unresponsive and witnessed seizure like activity at home as well as worsening neuro symptoms. Per family, pt. began to have right sided facial paralysis, right sided neck/facial pain and swelling one week ago, seen at [facility] and diagnosed with Bell's Palsy, was given Valacyclovir and prednisone, however family states weakness and paralysis has worsened, pt. now unable to eat or swallow, speak, or make eye contact. Family also reports three witnessed episodes of patient "shaking" with his "eyes rolling back into his head" over the past 2-3 days. Pt. with notable right sided facial droop, slurred speech, c/o right mastoid pain, severe headache, and midsternal chest pain, as well as inability to walk due to weakness. Dr. Bhat called for eval. no stroke code, but pt. to be seen in Main ED.

## 2018-04-08 NOTE — H&P ADULT - NSHPREVIEWOFSYSTEMS_GEN_ALL_CORE
REVIEW OF SYSTEMS  General:	  Skin/Breast:  Ophthalmologic:  ENMT:	  Respiratory and Thorax:  Cardiovascular:	  Gastrointestinal:	  Genitourinary:	  Musculoskeletal:	  Neurological: REVIEW OF SYSTEMS  General: No fever or chills  Skin/Breast: No itching or rash  Ophthalmologic: No eye pain or vision changes  ENMT: No nasal congestion or sore throat  Respiratory and Thorax: No shortness of breath or cough  Cardiovascular: + Intermittent burning chest pain, No palpitations  Gastrointestinal: + Nausea, + Poor PO intake, No vomiting, diarrhea, or abdominal pain  Genitourinary: No dysuria or hematuria  Musculoskeletal: No joint pain or back pain  Neurological: + Seizure-like activity, + Headache, + L-sided facial droop, + R facial numbness

## 2018-04-08 NOTE — H&P ADULT - PROBLEM SELECTOR PLAN 3
- Patient noted to have a leukocytosis on presentation (WBC = 12.56). Suspect WBC elevation is 2/2 recent steroid use. Patient is currently afebrile and without signs/symptoms of infection. Will monitor off antibiotics for now  - Monitor CBC - Patient noted to have a flat affect during the encounter. He required many prompts in order to answer questions and follow commands, and so his uncle answered many of the questions. Given his variety of neurological complaints, concern for a potential conversion disorder  - Psych consult in the AM

## 2018-04-08 NOTE — PROGRESS NOTE ADULT - SUBJECTIVE AND OBJECTIVE BOX
Dixie Christy PGY 1  Pager: [phone]/ [phone]  Patient is a 22y old  Male who presents with a chief complaint of Facial droop, Seizure-like activity (2018 05:26)      SUBJECTIVE / OVERNIGHT EVENTS:  Patient seen and examined at bedside. Admitted for left sided facial droop and seizure like activity.    MEDICATIONS  (STANDING):  predniSONE   Tablet 40 milliGRAM(s) Oral daily  valACYclovir 1000 milliGRAM(s) Oral three times a day    MEDICATIONS  (PRN):  acetaminophen   Tablet. 650 milliGRAM(s) Oral every 6 hours PRN Mild Pain (1 - 3)  traMADol 25 milliGRAM(s) Oral every 6 hours PRN Moderate Pain (4 - 6)      OBJECTIVE:    Vital Signs Last 24 Hrs  T(C): 36.7 (2018 02:19), Max: 36.9 (2018 23:35)  T(F): 98.1 (2018 02:19), Max: 98.4 (2018 23:35)  HR: 85 (2018 03:27) (65 - 90)  BP: 121/67 (2018 03:27) (120/75 - 132/82)  BP(mean): --  RR: 16 (2018 03:27) (16 - 20)  SpO2: 100% (2018 03:27) (99% - 100%)    CAPILLARY BLOOD GLUCOSE        I&O's Summary      PHYSICAL EXAM:  GENERAL: NAD, well-developed  HEAD:  Atraumatic, Normocephalic  EYES: EOMI, PERRLA, conjunctiva and sclera clear  NECK: Supple, No JVD  CHEST/LUNG: Clear to auscultation bilaterally; No wheeze  HEART: Regular rate and rhythm; No murmurs, rubs, or gallops  ABDOMEN: Soft, Nontender, Nondistended; Bowel sounds present  EXTREMITIES:  2+ Peripheral Pulses, No clubbing, cyanosis, or edema  PSYCH: AAOx3  NEUROLOGY: non-focal  SKIN: No rashes or lesions    LABS:                        15.2   12.56 )-----------( 241      ( 2018 01:25 )             46.2     Auto Eosinophil # 0.01  / Auto Eosinophil % 0.1   / Auto Neutrophil # 8.45  / Auto Neutrophil % 67.2  / BANDS % x        04-08    141  |  102  |  10  ----------------------------<  87  4.1   |  27  |  0.98    Ca    9.2      2018 01:25  TPro  7.1  /  Alb  4.4  /  TBili  0.3  /  DBili  x   /  AST  30  /  ALT  55<H>  /  AlkPhos  72  04-08          Urinalysis Basic - ( 2018 02:00 )    Color: PLYEL / Appearance: CLEAR / S.015 / pH: 6.5  Gluc: TRACE / Ketone: NEGATIVE  / Bili: NEGATIVE / Urobili: 1 mg/dL   Blood: NEGATIVE / Protein: NEGATIVE mg/dL / Nitrite: NEGATIVE   Leuk Esterase: TRACE / RBC: 5-10 / WBC 2-5   Sq Epi: x / Non Sq Epi: x / Bacteria: x          RESPIRATORY  VENT:    ABG:     VBG: ( 01:25 ) - VBG - pH: 7.38  | pCO2: 50    | pO2: 32    | Lactate: 1.3        RADIOLOGY & ADDITIONAL TESTS:  (Imaging Personally Reviewed)    Consultant(s) Notes Reviewed:      Care Discussed with Consultants/Other Providers: Dixie Christy PGY 1  Pager: [phone]/ [phone]  Patient is a 22y old  Male who presents with a chief complaint of Facial droop, Seizure-like activity (2018 05:26)      SUBJECTIVE / OVERNIGHT EVENTS:  Patient seen and examined at bedside. Admitted for left sided facial droop and seizure like activity.    MEDICATIONS  (STANDING):  predniSONE   Tablet 40 milliGRAM(s) Oral daily  valACYclovir 1000 milliGRAM(s) Oral three times a day    MEDICATIONS  (PRN):  acetaminophen   Tablet. 650 milliGRAM(s) Oral every 6 hours PRN Mild Pain (1 - 3)  traMADol 25 milliGRAM(s) Oral every 6 hours PRN Moderate Pain (4 - 6)      OBJECTIVE:    Vital Signs Last 24 Hrs  T(C): 36.7 (2018 02:19), Max: 36.9 (2018 23:35)  T(F): 98.1 (2018 02:19), Max: 98.4 (2018 23:35)  HR: 85 (2018 03:27) (65 - 90)  BP: 121/67 (2018 03:27) (120/75 - 132/82)  BP(mean): --  RR: 16 (2018 03:27) (16 - 20)  SpO2: 100% (2018 03:27) (99% - 100%)    CAPILLARY BLOOD GLUCOSE        I&O's Summary      PHYSICAL EXAM:  Constitutional: NAD  HEENT: NC/AT, Slightly dry mucous membranes, PERRL, EOMI  Neck: Supple  Respiratory: CTABL; No wheeze or rhonchi appreciated  Cardiovascular: RRR; +S1/S2, no murmurs noted  Gastrointestinal: soft, nontender, nondistended, +BS  Extremities: No peripheral edema, no rashes noted  Neurological: AxOx3, L-sided facial droop which changes in intensity throughout exam, decreased sensation of R face, RUE, and RLE compared to L side, cannot perform CN II-VI and XII intact, states cannot perform CN VII exam or CN XI  Psychiatric: Flat affect    LABS:                        15.2   12.56 )-----------( 241      ( 2018 01:25 )             46.2     Auto Eosinophil # 0.01  / Auto Eosinophil % 0.1   / Auto Neutrophil # 8.45  / Auto Neutrophil % 67.2  / BANDS % x            141  |  102  |  10  ----------------------------<  87  4.1   |  27  |  0.98    Ca    9.2      2018 01:25  TPro  7.1  /  Alb  4.4  /  TBili  0.3  /  DBili  x   /  AST  30  /  ALT  55<H>  /  AlkPhos  72            Urinalysis Basic - ( 2018 02:00 )    Color: PLYEL / Appearance: CLEAR / S.015 / pH: 6.5  Gluc: TRACE / Ketone: NEGATIVE  / Bili: NEGATIVE / Urobili: 1 mg/dL   Blood: NEGATIVE / Protein: NEGATIVE mg/dL / Nitrite: NEGATIVE   Leuk Esterase: TRACE / RBC: 5-10 / WBC 2-5   Sq Epi: x / Non Sq Epi: x / Bacteria: x          RESPIRATORY  VENT:    ABG:     VBG: ( 01:25 ) - VBG - pH: 7.38  | pCO2: 50    | pO2: 32    | Lactate: 1.3        RADIOLOGY & ADDITIONAL TESTS:  (Imaging Personally Reviewed)    Consultant(s) Notes Reviewed:      Care Discussed with Consultants/Other Providers:

## 2018-04-08 NOTE — H&P ADULT - PROBLEM SELECTOR PLAN 1
- Patient reports a 4-day history of sudden-onset L facial droop a/w R-facial numbness, inability to close R-eye, and severe headache. Patient has been evaluated at [facility] and [place] and was started on Prednisone/Valtrex for presumed Bell's Palsy. Head CT in the ED was negative. Patient was evaluated by Neurology in the ED who noted that patient's facial deficits disappeared when he was distracted raising concern for a possible conversion vs factitious disorder.   - Will check a Brain MRI per Neuro recs for further evaluation  - Will c/w Prednisone and Valtrex for now pending Brain MRI  - Follow-up with Neurology in the AM. If workup is unrevealing, will consider a Psychiatry consult - Patient reports a 4-day history of sudden-onset L facial droop a/w R-facial numbness, inability to close R-eye, and severe headache. Patient has been evaluated at [facility] and [place] and was started on Prednisone/Valtrex for presumed Bell's Palsy. Head CT in the ED was negative. Patient was evaluated by Neurology in the ED who noted that patient's facial deficits disappeared when he was distracted raising concern for a possible conversion vs factitious disorder.   - Will check a Brain MRI per Neuro recs for further evaluation  - Will c/w Prednisone and Valtrex for now pending Brain MRI  - Follow-up with Neurology in the AM. - Patient reports a 4-day history of sudden-onset L facial droop a/w R-facial numbness, inability to close R-eye, and severe headache. Patient has been evaluated at [facility] and [place] and was started on Prednisone/Valtrex for presumed Bell's Palsy. Head CT in the ED was negative. Patient was evaluated by Neurology in the ED who noted that patient's facial deficits disappeared when he was distracted raising concern for a possible conversion vs factitious disorder.   - Will check a Brain MRI per Neuro recs for further evaluation  - Will c/w Prednisone and Valtrex for now pending Brain MRI  - U-tox for barbiturates positivity may be false positive in the setting of NSAIDs at home; motrin, ibuprofen...  - Follow-up with Neurology in the AM.

## 2018-04-08 NOTE — PROGRESS NOTE ADULT - PROBLEM SELECTOR PLAN 3
- Patient noted to have a flat affect during the encounter  - He required many prompts in order to answer questions and follow commands, and so his uncle answered many of the questions. Given his variety of neurological complaints, concern for a potential conversion disorder  - will obtain MRI and video EEG- if no organic source, will consult psych - Patient noted to have a flat affect during the encounter  - during initial evaluation- he required many prompts in order to answer questions and follow commands, and so his uncle answered many of the questions  - however, during re-evaluation, patient answering questions appropriately- with continued flat affect  - if MRI and video EEG negative, will obtain psych consult

## 2018-04-08 NOTE — PROGRESS NOTE ADULT - PROBLEM SELECTOR PLAN 5
- Patient noted to have an elevated ALT (55) on presentation. Patient endorses some nausea, but no other abdominal complaints. Unclear etiology at this time.  - continue to trend- RUQ US if LFTs worsen  - may be in the setting of elevated CK in setting of seizure- will obtain CK level - Patient noted to have an elevated ALT (55) on presentation. Patient endorses some nausea, but no other abdominal complaints  - continue to trend- RUQ US if LFTs worsen  - trending down at this time

## 2018-04-08 NOTE — H&P ADULT - PROBLEM SELECTOR PLAN 6
- DVT PPx: Holding pharmacologic AC as patient is ambulatory and is a low risk for DVT. - Trace glcose in urine  - On steroid PTA for suspected Bell's Palsy; continued  - Family history of diabetes mellitus (both parents)  - f/u HgbA1c level in the AM

## 2018-04-09 DIAGNOSIS — F43.20 ADJUSTMENT DISORDER, UNSPECIFIED: ICD-10-CM

## 2018-04-09 LAB
ALBUMIN SERPL ELPH-MCNC: 4 G/DL — SIGNIFICANT CHANGE UP (ref 3.3–5)
ALP SERPL-CCNC: 71 U/L — SIGNIFICANT CHANGE UP (ref 40–120)
ALT FLD-CCNC: 44 U/L — HIGH (ref 4–41)
AST SERPL-CCNC: 23 U/L — SIGNIFICANT CHANGE UP (ref 4–40)
B BURGDOR C6 AB SER-ACNC: NEGATIVE — SIGNIFICANT CHANGE UP
B BURGDOR IGG+IGM SER-ACNC: 0.22 INDEX — SIGNIFICANT CHANGE UP (ref 0.01–0.89)
BACTERIA UR CULT: SIGNIFICANT CHANGE UP
BILIRUB SERPL-MCNC: 0.4 MG/DL — SIGNIFICANT CHANGE UP (ref 0.2–1.2)
BUN SERPL-MCNC: 14 MG/DL — SIGNIFICANT CHANGE UP (ref 7–23)
CALCIUM SERPL-MCNC: 9 MG/DL — SIGNIFICANT CHANGE UP (ref 8.4–10.5)
CHLORIDE SERPL-SCNC: 101 MMOL/L — SIGNIFICANT CHANGE UP (ref 98–107)
CO2 SERPL-SCNC: 25 MMOL/L — SIGNIFICANT CHANGE UP (ref 22–31)
CREAT SERPL-MCNC: 0.94 MG/DL — SIGNIFICANT CHANGE UP (ref 0.5–1.3)
GLUCOSE SERPL-MCNC: 70 MG/DL — SIGNIFICANT CHANGE UP (ref 70–99)
HAV IGM SER-ACNC: NONREACTIVE — SIGNIFICANT CHANGE UP
HBV CORE IGM SER-ACNC: NONREACTIVE — SIGNIFICANT CHANGE UP
HBV SURFACE AB SER-ACNC: NONREACTIVE — SIGNIFICANT CHANGE UP
HBV SURFACE AG SER-ACNC: NEGATIVE — SIGNIFICANT CHANGE UP
HCT VFR BLD CALC: 46.5 % — SIGNIFICANT CHANGE UP (ref 39–50)
HCV AB S/CO SERPL IA: 0.06 S/CO — SIGNIFICANT CHANGE UP
HCV AB SERPL-IMP: SIGNIFICANT CHANGE UP
HGB BLD-MCNC: 15.7 G/DL — SIGNIFICANT CHANGE UP (ref 13–17)
MAGNESIUM SERPL-MCNC: 2.1 MG/DL — SIGNIFICANT CHANGE UP (ref 1.6–2.6)
MCHC RBC-ENTMCNC: 29.9 PG — SIGNIFICANT CHANGE UP (ref 27–34)
MCHC RBC-ENTMCNC: 33.8 % — SIGNIFICANT CHANGE UP (ref 32–36)
MCV RBC AUTO: 88.6 FL — SIGNIFICANT CHANGE UP (ref 80–100)
NRBC # FLD: 0 — SIGNIFICANT CHANGE UP
PHOSPHATE SERPL-MCNC: 2.7 MG/DL — SIGNIFICANT CHANGE UP (ref 2.5–4.5)
PLATELET # BLD AUTO: 247 K/UL — SIGNIFICANT CHANGE UP (ref 150–400)
PMV BLD: 11.6 FL — SIGNIFICANT CHANGE UP (ref 7–13)
POTASSIUM SERPL-MCNC: 3.6 MMOL/L — SIGNIFICANT CHANGE UP (ref 3.5–5.3)
POTASSIUM SERPL-SCNC: 3.6 MMOL/L — SIGNIFICANT CHANGE UP (ref 3.5–5.3)
PROT SERPL-MCNC: 7.1 G/DL — SIGNIFICANT CHANGE UP (ref 6–8.3)
RBC # BLD: 5.25 M/UL — SIGNIFICANT CHANGE UP (ref 4.2–5.8)
RBC # FLD: 11.7 % — SIGNIFICANT CHANGE UP (ref 10.3–14.5)
SODIUM SERPL-SCNC: 140 MMOL/L — SIGNIFICANT CHANGE UP (ref 135–145)
SPECIMEN SOURCE: SIGNIFICANT CHANGE UP
WBC # BLD: 10.62 K/UL — HIGH (ref 3.8–10.5)
WBC # FLD AUTO: 10.62 K/UL — HIGH (ref 3.8–10.5)

## 2018-04-09 PROCEDURE — 99232 SBSQ HOSP IP/OBS MODERATE 35: CPT

## 2018-04-09 PROCEDURE — 93010 ELECTROCARDIOGRAM REPORT: CPT

## 2018-04-09 PROCEDURE — 99223 1ST HOSP IP/OBS HIGH 75: CPT

## 2018-04-09 RX ORDER — PANTOPRAZOLE SODIUM 20 MG/1
40 TABLET, DELAYED RELEASE ORAL
Qty: 0 | Refills: 0 | Status: DISCONTINUED | OUTPATIENT
Start: 2018-04-09 | End: 2018-04-16

## 2018-04-09 RX ORDER — ACETAMINOPHEN 500 MG
1000 TABLET ORAL ONCE
Qty: 0 | Refills: 0 | Status: COMPLETED | OUTPATIENT
Start: 2018-04-09 | End: 2018-04-09

## 2018-04-09 RX ADMIN — Medication 1 DROP(S): at 20:58

## 2018-04-09 RX ADMIN — TRAMADOL HYDROCHLORIDE 25 MILLIGRAM(S): 50 TABLET ORAL at 20:58

## 2018-04-09 RX ADMIN — TRAMADOL HYDROCHLORIDE 25 MILLIGRAM(S): 50 TABLET ORAL at 13:38

## 2018-04-09 RX ADMIN — Medication 400 MILLIGRAM(S): at 04:02

## 2018-04-09 RX ADMIN — TRAMADOL HYDROCHLORIDE 25 MILLIGRAM(S): 50 TABLET ORAL at 21:55

## 2018-04-09 RX ADMIN — TRAMADOL HYDROCHLORIDE 25 MILLIGRAM(S): 50 TABLET ORAL at 14:38

## 2018-04-09 RX ADMIN — Medication 1 DROP(S): at 13:46

## 2018-04-09 RX ADMIN — VALACYCLOVIR 1000 MILLIGRAM(S): 500 TABLET, FILM COATED ORAL at 14:31

## 2018-04-09 RX ADMIN — VALACYCLOVIR 1000 MILLIGRAM(S): 500 TABLET, FILM COATED ORAL at 20:58

## 2018-04-09 RX ADMIN — Medication 1000 MILLIGRAM(S): at 04:17

## 2018-04-09 RX ADMIN — PANTOPRAZOLE SODIUM 40 MILLIGRAM(S): 20 TABLET, DELAYED RELEASE ORAL at 09:25

## 2018-04-09 NOTE — BEHAVIORAL HEALTH ASSESSMENT NOTE - NSBHSUICPROTECTFACT_PSY_A_CORE
Responsibility to family and others/Future oriented/Supportive social network or family/Identifies reasons for living/Engaged in work or school
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
## 2018-04-09 NOTE — PROGRESS NOTE ADULT - PROBLEM SELECTOR PLAN 4
- Patient noted to have a leukocytosis on presentation (WBC = 12.56)- trending down  - likely in setting of steroid use   - Monitor CBC

## 2018-04-09 NOTE — BEHAVIORAL HEALTH ASSESSMENT NOTE - NSBHCHARTREVIEWVS_PSY_A_CORE FT
Vital Signs Last 24 Hrs  T(C): 36.3 (09 Apr 2018 13:47), Max: 36.7 (08 Apr 2018 16:55)  T(F): 97.4 (09 Apr 2018 13:47), Max: 98.1 (08 Apr 2018 16:55)  HR: 66 (09 Apr 2018 13:47) (60 - 70)  BP: 112/72 (09 Apr 2018 13:47) (108/66 - 112/72)  BP(mean): --  RR: 18 (09 Apr 2018 13:47) (18 - 18)  SpO2: 100% (09 Apr 2018 13:47) (98% - 100%)

## 2018-04-09 NOTE — PROGRESS NOTE ADULT - PROBLEM SELECTOR PLAN 1
- presents with  4-day history of sudden-onset L facial droop a/w R-facial numbness, inability to close R-eye, and severe headache  - at OSH, patient was deemed to have bell's palsy- on steroids (c/w prednisone)  - MRI shows no pathology- per neuro, possibly conversion disorder   - patient's facial deficits disappeared when he was distracted   - U-tox for barbiturates positivity may be false positive in the setting of NSAIDs at home; motrin, ibuprofen  - Follow-up with Neurology  - possible psych eval after video EEG

## 2018-04-09 NOTE — BEHAVIORAL HEALTH ASSESSMENT NOTE - NSBHCONSULTFOLLOWAFTERCARE_PSY_A_CORE FT
[facility] walk in clinic : [phone] (9AM-7PM)  [facility] clinic: [phone]

## 2018-04-09 NOTE — BEHAVIORAL HEALTH ASSESSMENT NOTE - NSBHCHARTREVIEWLAB_PSY_A_CORE FT
04-09    140  |  101  |  14  ----------------------------<  70  3.6   |  25  |  0.94    Ca    9.0      09 Apr 2018 05:25  Phos  2.7     04-09  Mg     2.1     04-09    TPro  7.1  /  Alb  4.0  /  TBili  0.4  /  DBili  x   /  AST  23  /  ALT  44<H>  /  AlkPhos  71  04-09  LIVER FUNCTIONS - ( 09 Apr 2018 05:25 )  Alb: 4.0 g/dL / Pro: 7.1 g/dL / ALK PHOS: 71 u/L / ALT: 44 u/L / AST: 23 u/L / GGT: x         CBC Full  -  ( 09 Apr 2018 05:25 )  WBC Count : 10.62 K/uL  Hemoglobin : 15.7 g/dL  Hematocrit : 46.5 %  Platelet Count - Automated : 247 K/uL  Mean Cell Volume : 88.6 fL  Mean Cell Hemoglobin : 29.9 pg  Mean Cell Hemoglobin Concentration : 33.8 %

## 2018-04-09 NOTE — BEHAVIORAL HEALTH ASSESSMENT NOTE - NSBHCHARTREVIEWIMAGING_PSY_A_CORE FT
4.8.18  EXAM:  MR BRAIN    IMPRESSION: Unremarkable MRI of the brain.    TRUE SMYTH M.D., ATTENDING RADIOLOGIST  This document has been electronically signed. Apr 8 2018  3:02PM    EXAM:  CT BRAIN    IMPRESSION:   No CT evidence of acute intracranial hemorrhage, mass or infarct.     OMEGA VINCENT M.D., RADIOLOGY RESIDENT  This document has been electronically signed.  CORNEL JACKSON M.D., ATTENDING RADIOLOGIST  This document has been electronically signed. Apr 8 2018  2:03AM
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
## 2018-04-09 NOTE — PROGRESS NOTE ADULT - PROBLEM SELECTOR PLAN 3
- Patient noted to have a flat affect during the encounter  - answers questions slowly  - physical exam findings do not correlate with organic physiology  - if MRI and video EEG negative, will obtain psych consult

## 2018-04-09 NOTE — PROGRESS NOTE ADULT - PROBLEM SELECTOR PLAN 7
- DVT PPx:  IMPROVE score= 0, no pharm  dvt ppx;  encourage ambulation  - diet regular- however patient stating he cannot open mouth to use straw- however he can open to talk.

## 2018-04-09 NOTE — PROGRESS NOTE ADULT - ATTENDING COMMENTS
MRI results from 4/8 personally reviewed and without acute pathology.   Psych recs appreciated  Pending VEEG

## 2018-04-09 NOTE — BEHAVIORAL HEALTH ASSESSMENT NOTE - NSBHCHARTREVIEWINVESTIGATE_PSY_A_CORE FT
4.7.18  Ventricular Rate 63 BPM    Atrial Rate 63 BPM    P-R Interval 136 ms    QRS Duration 86 ms    Q-T Interval 384 ms    QTC Calculation(Bezet) 392 ms    P Axis 19 degrees    R Axis 43 degrees    T Axis 38 degrees    Diagnosis Line Normal sinus rhythm  Normal ECG

## 2018-04-09 NOTE — PROGRESS NOTE ADULT - PROBLEM SELECTOR PLAN 2
- Patient reported to have 3 episodes of whole-body shaking followed by unresponsiveness over the past 2 days lasting 20-25 seconds  - no episodes witnessed in the hospital  - obtain video EEG to rule out epilepsy  - Will check Brain MRI as noted above  - neuro recs appreciated  - possible pseuodseizure

## 2018-04-09 NOTE — PROGRESS NOTE ADULT - ASSESSMENT
22M with no PMHx who presents to the ED with L-sided facial droop possibly 2/2 Bell's Palsy vs conversion disorder, also with recent seizure-like activity concerning for epilepsy vs pseudoseizure. 22M with no PMHx who presents to the ED with L-sided facial droop possibly 2/2 Bell's Palsy vs conversion disorder, also with recent seizure-like activity concerning for epilepsy vs psychogenic non epileptic seizure.

## 2018-04-09 NOTE — BEHAVIORAL HEALTH ASSESSMENT NOTE - NSBHREFERDETAILS_PSY_A_CORE_FT
Admitted for possible seizure-like activity/ left sided facial droop, evaluate for conversion disorder.

## 2018-04-09 NOTE — PROGRESS NOTE ADULT - PROBLEM SELECTOR PLAN 5
- Patient noted to have an elevated ALT (55) on presentation. Patient endorses some nausea, but no other abdominal complaints  - continue to trend- RUQ US if LFTs worsen  - trending down at this time - Patient noted to have an elevated ALT (55) on presentation. Patient endorses some nausea, but no other abdominal complaints  - continue to trend- RUQ US if LFTs worsen  - trending down at this time  -hep panel negative

## 2018-04-09 NOTE — PROGRESS NOTE ADULT - SUBJECTIVE AND OBJECTIVE BOX
Dixie Christy PGY 1  Pager: [phone]/ [phone]    Patient is a 22y old  Male who presents with a chief complaint of Facial droop, Seizure-like activity (2018 05:26)      SUBJECTIVE / OVERNIGHT EVENTS:  Patient seen and examined at bedside, Overnight, patient complaining of headache and diffuse weakness, and was examined by night resident. Neuro exam did not correlate to physiological exam.     This morning, patient is complaining of _____    MEDICATIONS  (STANDING):  artificial  tears Solution 1 Drop(s) Both EYES two times a day  predniSONE   Tablet 40 milliGRAM(s) Oral daily  valACYclovir 1000 milliGRAM(s) Oral three times a day    MEDICATIONS  (PRN):  acetaminophen   Tablet. 650 milliGRAM(s) Oral every 6 hours PRN Mild Pain (1 - 3)  traMADol 25 milliGRAM(s) Oral every 6 hours PRN Moderate Pain (4 - 6)      OBJECTIVE:    Vital Signs Last 24 Hrs  T(C): 36.7 (2018 05:33), Max: 36.7 (2018 16:55)  T(F): 98.1 (2018 05:33), Max: 98.1 (2018 16:55)  HR: 64 (2018 05:33) (59 - 70)  BP: 111/72 (2018 05:33) (108/66 - 120/80)  BP(mean): --  RR: 18 (2018 05:33) (16 - 18)  SpO2: 98% (2018 05:33) (98% - 100%)    CAPILLARY BLOOD GLUCOSE        I&O's Summary    PE:  	Constitutional: NAD  	HEENT: NC/AT, Slightly dry mucous membranes  	Neck: Supple  	Respiratory: CTAB; No wheeze or rhonchi appreciated  	Cardiovascular: RRR; +S1/S2  	Gastrointestinal: +BS, Soft, NT, No rigidity  	Genitourinary: No Del Val[place], No suprapubic TTP  	Extremities: No peripheral edema  	Neurological: A+Ox3, L-sided facial droop which would improve somewhat when patient was distracted, PERRL, EOMI, Moves all extremities, Subjective decreased sensation of R face, RUE, and RLE compared to L side  	Skin: Warm and dry  Psychiatric: Flat affect    LABS:                        15.7   10.62 )-----------( 247      ( 2018 05:25 )             46.5     Auto Eosinophil # x     / Auto Eosinophil % x     / Auto Neutrophil # x     / Auto Neutrophil % x     / BANDS % x                            14.2   11.24 )-----------( 221      ( 2018 07:31 )             43.6     Auto Eosinophil # 0.04  / Auto Eosinophil % 0.4   / Auto Neutrophil # 6.08  / Auto Neutrophil % 54.0  / BANDS % x                            15.2   12.56 )-----------( 241      ( 2018 01:25 )             46.2     Auto Eosinophil # 0.01  / Auto Eosinophil % 0.1   / Auto Neutrophil # 8.45  / Auto Neutrophil % 67.2  / BANDS % x        04-08    143  |  104  |  10  ----------------------------<  86  3.8   |  27  |  0.99  04-08    141  |  102  |  10  ----------------------------<  87  4.1   |  27  |  0.98    Ca    8.4      2018 07:31  Mg     2.0     04-08  Phos  2.7     04-08  TPro  6.3  /  Alb  3.8  /  TBili  0.3  /  DBili  x   /  AST  28  /  ALT  48<H>  /  AlkPhos  67  04-08  TPro  7.1  /  Alb  4.4  /  TBili  0.3  /  DBili  x   /  AST  30  /  ALT  55<H>  /  AlkPhos  72  04-08      CARDIAC MARKERS ( 2018 07:31 )  x     / x     / 103 u/L / x     / x          Urinalysis Basic - ( 2018 02:00 )    Color: PLYEL / Appearance: CLEAR / S.015 / pH: 6.5  Gluc: TRACE / Ketone: NEGATIVE  / Bili: NEGATIVE / Urobili: 1 mg/dL   Blood: NEGATIVE / Protein: NEGATIVE mg/dL / Nitrite: NEGATIVE   Leuk Esterase: TRACE / RBC: 5-10 / WBC 2-5   Sq Epi: x / Non Sq Epi: x / Bacteria: x Dixie Christy PGY 1  Pager: [phone]/ [phone]    Patient is a 22y old  Male who presents with a chief complaint of Facial droop, Seizure-like activity (2018 05:26)      SUBJECTIVE / OVERNIGHT EVENTS:  Patient seen and examined at bedside, Overnight, patient complaining of headache and diffuse weakness, and was examined by night resident. Neuro exam did not correlate to physiological exam.     This morning, patient is complaining of chest pain. His EKG showed sinus beverly (57) with no ischemic changes. Patient described pain as substernal and burning in nature. Patient was hemodynamically stable on exam, without any clinical features of volume overload, new murmurs or causes of cardiac etiology.    MEDICATIONS  (STANDING):  artificial  tears Solution 1 Drop(s) Both EYES two times a day  predniSONE   Tablet 40 milliGRAM(s) Oral daily  valACYclovir 1000 milliGRAM(s) Oral three times a day    MEDICATIONS  (PRN):  acetaminophen   Tablet. 650 milliGRAM(s) Oral every 6 hours PRN Mild Pain (1 - 3)  traMADol 25 milliGRAM(s) Oral every 6 hours PRN Moderate Pain (4 - 6)      OBJECTIVE:    Vital Signs Last 24 Hrs  T(C): 36.7 (2018 05:33), Max: 36.7 (2018 16:55)  T(F): 98.1 (2018 05:33), Max: 98.1 (2018 16:55)  HR: 64 (2018 05:33) (59 - 70)  BP: 111/72 (2018 05:33) (108/66 - 120/80)  BP(mean): --  RR: 18 (2018 05:33) (16 - 18)  SpO2: 98% (2018 05:33) (98% - 100%)    CAPILLARY BLOOD GLUCOSE        I&O's Summary    PE:  	Constitutional: NAD  	HEENT: NC/AT, Slightly dry mucous membranes  	Neck: Supple  	Respiratory: CTAB; No wheeze or rhonchi appreciated  	Cardiovascular: RRR; +S1/S2  	Gastrointestinal: +BS, Soft, NT, No rigidity  	Genitourinary: No Del Valle, No suprapubic TTP  	Extremities: No peripheral edema  	Neurological: A+Ox3, L-sided facial droop which would improve somewhat when patient was distracted- waxes and wanes, PERRL, EOMI, Moves all extremities against gravity, Subjective decreased sensation of R face, RUE, and RLE compared to L side (grossly unchanged from yesterday)  	Skin: Warm and dry  Psychiatric: Flat affect    LABS:                        15.7   10.62 )-----------( 247      ( 2018 05:25 )             46.5     Auto Eosinophil # x     / Auto Eosinophil % x     / Auto Neutrophil # x     / Auto Neutrophil % x     / BANDS % x                            14.2   11.24 )-----------( 221      ( 2018 07:31 )             43.6     Auto Eosinophil # 0.04  / Auto Eosinophil % 0.4   / Auto Neutrophil # 6.08  / Auto Neutrophil % 54.0  / BANDS % x                            15.2   12.56 )-----------( 241      ( 2018 01:25 )             46.2     Auto Eosinophil # 0.01  / Auto Eosinophil % 0.1   / Auto Neutrophil # 8.45  / Auto Neutrophil % 67.2  / BANDS % x        04-08    143  |  104  |  10  ----------------------------<  86  3.8   |  27  |  0.99  04-08    141  |  102  |  10  ----------------------------<  87  4.1   |  27  |  0.98    Ca    8.4      2018 07:31  Mg     2.0     04-08  Phos  2.7     04-08  TPro  6.3  /  Alb  3.8  /  TBili  0.3  /  DBili  x   /  AST  28  /  ALT  48<H>  /  AlkPhos  67  04-08  TPro  7.1  /  Alb  4.4  /  TBili  0.3  /  DBili  x   /  AST  30  /  ALT  55<H>  /  AlkPhos  72  04-08      CARDIAC MARKERS ( 2018 07:31 )  x     / x     / 103 u/L / x     / x          Urinalysis Basic - ( 2018 02:00 )    Color: PLYEL / Appearance: CLEAR / S.015 / pH: 6.5  Gluc: TRACE / Ketone: NEGATIVE  / Bili: NEGATIVE / Urobili: 1 mg/dL   Blood: NEGATIVE / Protein: NEGATIVE mg/dL / Nitrite: NEGATIVE   Leuk Esterase: TRACE / RBC: 5-10 / WBC 2-5   Sq Epi: x / Non Sq Epi: x / Bacteria: x

## 2018-04-09 NOTE — PROGRESS NOTE ADULT - PROBLEM SELECTOR PLAN 6
- Trace glucose in urine  - On steroid PTA for suspected Bell's Palsy; continued  - Family history of diabetes mellitus (both parents)  - A1c 6.1 - Trace glucose in urine  - On steroid PTA for possible Bell's Palsy; continued  - Family history of diabetes mellitus (both parents)  - A1c 6.1

## 2018-04-09 NOTE — CHART NOTE - NSCHARTNOTEFT_GEN_A_CORE
Called to bedside because patient complaining of headache, diffuse weakness. Pt told RN that he would prefer an alternative medicine to tramadol for the headache. Spoke w uncle at bedside, who expressed concern that patient had reportedly not eaten in days, attributed to inability to open mouth sufficiently widely. Asked uncle how patient was able to speak intelligibly but unable to open mouth widely enough to permit entry of straw. Uncle unsure.   On exam, pt speaking in soft voice, moving mouth very little.  strength markedly reduced bilaterally. When each of pt's upper extremities was lifted by examiner and released above patient's head, patient's upper extremity moved on its way down such that patient's hand did not hit his face. To application of noxious stimulus on L axillary region, pt briskly moved his right arm towards examiner. After this brief neurological exam patient stopped answering examiner's questions about other symptoms.   Reassured uncle that based on exam and MRI, pt's problem did not appear to be serious, and that we would continue to monitor vital signs and in AM would either re-attempt PO feedings or, if pt appearing volume-depleted and unable to tolerate PO, would give IV fluids if necessary.     Plan  - For headache, acetaminophen 1000mg IV x1  - Encouraged nurse to re-contact me with concern of change in patient's neurologic status     Merlin Duong MD, PGY-1  [facility] House Staff  Night Float Teams 1-3  Pager [phone]

## 2018-04-09 NOTE — BEHAVIORAL HEALTH ASSESSMENT NOTE - RISK ASSESSMENT
Low risk: Risk factors are male gender and recent medical diagnosis, protective factors include having a supportive family with whom he resides, employment, no prior psych hx.

## 2018-04-09 NOTE — BEHAVIORAL HEALTH ASSESSMENT NOTE - NSBHVIOLPROTECT_PSY_A_CORE
Relationship stability/Employment stability/Engagement in treatment/Residential stability/Sobriety/Affective stability

## 2018-04-10 LAB
ALBUMIN SERPL ELPH-MCNC: 4.1 G/DL — SIGNIFICANT CHANGE UP (ref 3.3–5)
ALP SERPL-CCNC: 75 U/L — SIGNIFICANT CHANGE UP (ref 40–120)
ALT FLD-CCNC: 40 U/L — SIGNIFICANT CHANGE UP (ref 4–41)
AST SERPL-CCNC: 19 U/L — SIGNIFICANT CHANGE UP (ref 4–40)
BASOPHILS # BLD AUTO: 0.04 K/UL — SIGNIFICANT CHANGE UP (ref 0–0.2)
BASOPHILS NFR BLD AUTO: 0.4 % — SIGNIFICANT CHANGE UP (ref 0–2)
BILIRUB SERPL-MCNC: 0.4 MG/DL — SIGNIFICANT CHANGE UP (ref 0.2–1.2)
BUN SERPL-MCNC: 16 MG/DL — SIGNIFICANT CHANGE UP (ref 7–23)
CALCIUM SERPL-MCNC: 9.1 MG/DL — SIGNIFICANT CHANGE UP (ref 8.4–10.5)
CHLORIDE SERPL-SCNC: 98 MMOL/L — SIGNIFICANT CHANGE UP (ref 98–107)
CO2 SERPL-SCNC: 27 MMOL/L — SIGNIFICANT CHANGE UP (ref 22–31)
CREAT SERPL-MCNC: 1.02 MG/DL — SIGNIFICANT CHANGE UP (ref 0.5–1.3)
EOSINOPHIL # BLD AUTO: 0.09 K/UL — SIGNIFICANT CHANGE UP (ref 0–0.5)
EOSINOPHIL NFR BLD AUTO: 0.9 % — SIGNIFICANT CHANGE UP (ref 0–6)
GLUCOSE SERPL-MCNC: 75 MG/DL — SIGNIFICANT CHANGE UP (ref 70–99)
HCT VFR BLD CALC: 48.2 % — SIGNIFICANT CHANGE UP (ref 39–50)
HGB BLD-MCNC: 16.2 G/DL — SIGNIFICANT CHANGE UP (ref 13–17)
IMM GRANULOCYTES # BLD AUTO: 0.04 # — SIGNIFICANT CHANGE UP
IMM GRANULOCYTES NFR BLD AUTO: 0.4 % — SIGNIFICANT CHANGE UP (ref 0–1.5)
LYMPHOCYTES # BLD AUTO: 3.35 K/UL — HIGH (ref 1–3.3)
LYMPHOCYTES # BLD AUTO: 34.6 % — SIGNIFICANT CHANGE UP (ref 13–44)
MCHC RBC-ENTMCNC: 29.6 PG — SIGNIFICANT CHANGE UP (ref 27–34)
MCHC RBC-ENTMCNC: 33.6 % — SIGNIFICANT CHANGE UP (ref 32–36)
MCV RBC AUTO: 88 FL — SIGNIFICANT CHANGE UP (ref 80–100)
MONOCYTES # BLD AUTO: 0.78 K/UL — SIGNIFICANT CHANGE UP (ref 0–0.9)
MONOCYTES NFR BLD AUTO: 8.1 % — SIGNIFICANT CHANGE UP (ref 2–14)
NEUTROPHILS # BLD AUTO: 5.38 K/UL — SIGNIFICANT CHANGE UP (ref 1.8–7.4)
NEUTROPHILS NFR BLD AUTO: 55.6 % — SIGNIFICANT CHANGE UP (ref 43–77)
NRBC # FLD: 0 — SIGNIFICANT CHANGE UP
PLATELET # BLD AUTO: 248 K/UL — SIGNIFICANT CHANGE UP (ref 150–400)
PMV BLD: 11.2 FL — SIGNIFICANT CHANGE UP (ref 7–13)
POTASSIUM SERPL-MCNC: 3.8 MMOL/L — SIGNIFICANT CHANGE UP (ref 3.5–5.3)
POTASSIUM SERPL-SCNC: 3.8 MMOL/L — SIGNIFICANT CHANGE UP (ref 3.5–5.3)
PROT SERPL-MCNC: 7 G/DL — SIGNIFICANT CHANGE UP (ref 6–8.3)
RBC # BLD: 5.48 M/UL — SIGNIFICANT CHANGE UP (ref 4.2–5.8)
RBC # FLD: 11.7 % — SIGNIFICANT CHANGE UP (ref 10.3–14.5)
SODIUM SERPL-SCNC: 138 MMOL/L — SIGNIFICANT CHANGE UP (ref 135–145)
WBC # BLD: 9.68 K/UL — SIGNIFICANT CHANGE UP (ref 3.8–10.5)
WBC # FLD AUTO: 9.68 K/UL — SIGNIFICANT CHANGE UP (ref 3.8–10.5)

## 2018-04-10 PROCEDURE — 99233 SBSQ HOSP IP/OBS HIGH 50: CPT | Mod: GC

## 2018-04-10 PROCEDURE — 99232 SBSQ HOSP IP/OBS MODERATE 35: CPT

## 2018-04-10 PROCEDURE — 99233 SBSQ HOSP IP/OBS HIGH 50: CPT

## 2018-04-10 RX ORDER — LIDOCAINE 4 G/100G
1 CREAM TOPICAL DAILY
Qty: 0 | Refills: 0 | Status: COMPLETED | OUTPATIENT
Start: 2018-04-10 | End: 2018-04-10

## 2018-04-10 RX ADMIN — Medication 1 DROP(S): at 05:38

## 2018-04-10 RX ADMIN — PANTOPRAZOLE SODIUM 40 MILLIGRAM(S): 20 TABLET, DELAYED RELEASE ORAL at 05:38

## 2018-04-10 RX ADMIN — TRAMADOL HYDROCHLORIDE 25 MILLIGRAM(S): 50 TABLET ORAL at 18:59

## 2018-04-10 RX ADMIN — VALACYCLOVIR 1000 MILLIGRAM(S): 500 TABLET, FILM COATED ORAL at 05:38

## 2018-04-10 RX ADMIN — Medication 1 DROP(S): at 18:41

## 2018-04-10 RX ADMIN — TRAMADOL HYDROCHLORIDE 25 MILLIGRAM(S): 50 TABLET ORAL at 19:29

## 2018-04-10 RX ADMIN — VALACYCLOVIR 1000 MILLIGRAM(S): 500 TABLET, FILM COATED ORAL at 14:21

## 2018-04-10 RX ADMIN — VALACYCLOVIR 1000 MILLIGRAM(S): 500 TABLET, FILM COATED ORAL at 21:29

## 2018-04-10 RX ADMIN — TRAMADOL HYDROCHLORIDE 25 MILLIGRAM(S): 50 TABLET ORAL at 10:31

## 2018-04-10 RX ADMIN — LIDOCAINE 1 PATCH: 4 CREAM TOPICAL at 14:20

## 2018-04-10 RX ADMIN — TRAMADOL HYDROCHLORIDE 25 MILLIGRAM(S): 50 TABLET ORAL at 10:01

## 2018-04-10 RX ADMIN — Medication 40 MILLIGRAM(S): at 05:38

## 2018-04-10 NOTE — PROGRESS NOTE ADULT - PROBLEM SELECTOR PLAN 1
- presents with  4-day history of sudden-onset L facial droop a/w R-facial numbness, inability to close R-eye, and severe headache- thought to be 2/2 to bells palsy per outside hospital evaluation  - MRI of head negative and neurological exam does not correlate with physiolgoic pathology  - likely conversion disorder- psych recs appreciated- will obtain video eeg to ensure no epileptiform   - neuro recs appreciated - presents with  4-day history of sudden-onset L facial droop a/w R-facial numbness, inability to close R-eye, and severe headache- thought to be 2/2 to bells palsy per outside hospital evaluation  - MRI of head negative and neurological exam does not correlate with physiolgoic pathology  - likely conversion disorder- psych recs appreciated- will obtain video eeg to ensure no epileptiform   - neuro recs appreciated will also obtain Acetylcholine R ab

## 2018-04-10 NOTE — PROGRESS NOTE ADULT - ASSESSMENT
22 M no PMH p/w facial droop, gen weakness, headache and seizure like activity.    considering conversion disorder vs Factitious vs Malingering for  facial symptoms    Seizure like activity could be Epilepsy vs Pseudoseizure     MRI Brain unremarkable,  Lyme neg, and HIV neg    Plan:  c/w valacyclovir and Prednisone for bells palsy as per primary team  vEEG for 24 hours  check acetylcholine antibodies, ACE  consider psych evaluation if vEEG negative 22 M no PMH p/w facial droop, gen weakness, headache and seizure like activity.    considering conversion disorder vs Factitious vs Malingering for  facial symptoms    Seizure like activity could be Epilepsy vs Pseudoseizure     MRI Brain unremarkable,  Lyme neg, and HIV neg    Plan:  c/w valacyclovir and Prednisone for bells palsy as per primary team  vEEG for 24 hours  check acetylcholine antibodies, ACE  Will need LP to rule out CNS inflammation or autoimmune process

## 2018-04-10 NOTE — PROGRESS NOTE ADULT - PROBLEM SELECTOR PLAN 6
- Trace glucose in urine  - On steroid PTA for possible Bell's Palsy; continued  - Family history of diabetes mellitus (both parents)  - A1c 6.1

## 2018-04-10 NOTE — PROGRESS NOTE BEHAVIORAL HEALTH - SUMMARY
Patient is a 23 y/o male from [facility], immigrated to [place] 3.5 y/a, employed as a [employer] , single, childless, domiciled with aunt, sister and her , with no significant PMHx or PPHx, no history of psychiatric hospitalizations, and no history of suicidality, admitted with seizure-like activity. Psychiatry consulted due to concerns regarding conversion disorder.    This morning, pt continues to complain of pain in R neck, b/l frontal headache, inability to close R eyelid, L facial droop, inability to open mouth, and b/l LE weakness. Physical therapy noted inconsistent strength assessment. Pt continues to lack any mood or psychotic symptoms, is future oriented and hopeful and does not meet criteria for in-pt psychiatric hospitalization. Patient is a 23 y/o male from [facility], immigrated to [place] 3.5 y/a, employed as a [employer] , single, childless, domiciled with aunt, sister and her , with no significant PMHx or PPHx, no history of psychiatric hospitalizations, and no history of suicidality, admitted with seizure-like activity. Psychiatry consulted due to concerns regarding conversion disorder.    This morning, pt continues to complain of pain in R neck, b/l frontal headache, inability to close R eyelid, L facial droop, inability to open mouth, and b/l LE weakness. Physical therapy noted inconsistent strength assessment. Pt continues to lack any mood or psychotic symptoms, is future oriented and hopeful and does not meet criteria for in-pt psychiatric hospitalization.     Please obtain VEEG prior to discharge.

## 2018-04-10 NOTE — PHYSICAL THERAPY INITIAL EVALUATION ADULT - PERTINENT HX OF CURRENT PROBLEM, REHAB EVAL
Pt. admitted for facial droop and seizure like activity. Per radiology reports, CT head negative for acute infarct or hemorrhage, MRI head unremarkable. Per neurology documentation, neurology examination was unremarkable as deficits were absent.

## 2018-04-10 NOTE — PHYSICAL THERAPY INITIAL EVALUATION ADULT - ADDITIONAL COMMENTS
Pt. was left supine in bed post PT Evaluation, NAD, call patterson within reach. RN Lucy made aware of pt. status and participation in PT.

## 2018-04-10 NOTE — PHYSICAL THERAPY INITIAL EVALUATION ADULT - CRITERIA FOR SKILLED THERAPEUTIC INTERVENTIONS
predicted duration of therapy intervention/anticipated discharge recommendation/risk reduction/prevention/therapy frequency/rehab potential/impairments found

## 2018-04-10 NOTE — PROGRESS NOTE BEHAVIORAL HEALTH - DETAILS
bilateral frontal headache right eye unable to close eyelid left lip numbness right sided neck pain seizure activity

## 2018-04-10 NOTE — PROGRESS NOTE ADULT - PROBLEM SELECTOR PLAN 2
- Patient reported to have 3 episodes of whole-body shaking followed by unresponsiveness over the past 2 days lasting 20-25 seconds- 2 witnessed by his uncle and 1 that patient reports feeling himself  - pending video EEG  - neuro recs appreciated  - possible non psychogenic seizure

## 2018-04-10 NOTE — PROGRESS NOTE ADULT - PROBLEM SELECTOR PLAN 7
- DVT PPx:  IMPROVE score= 0, no pharm  dvt ppx;  encourage ambulation  - diet- full liquid as patient states he cannot eat due to lack of mouth opening

## 2018-04-10 NOTE — PROGRESS NOTE ADULT - PROBLEM SELECTOR PLAN 3
- patient noted to have a flat affect during the encounter  - answers questions slowly  - physical exam findings do not correlate with organic physiology

## 2018-04-10 NOTE — PROGRESS NOTE ADULT - SUBJECTIVE AND OBJECTIVE BOX
Dixie Christy PGY 1  Pager: [phone]/ [phone]  Patient is a 22y old  Male who presents with a chief complaint of Facial droop, Seizure-like activity (08 Apr 2018 05:26)    SUBJECTIVE / OVERNIGHT EVENTS:  Patient seen and examined at bedside with no acute events overnight. No acute events overnight.    MEDICATIONS  (STANDING):  artificial  tears Solution 1 Drop(s) Both EYES two times a day  pantoprazole    Tablet 40 milliGRAM(s) Oral before breakfast  predniSONE   Tablet 40 milliGRAM(s) Oral daily  valACYclovir 1000 milliGRAM(s) Oral three times a day    MEDICATIONS  (PRN):  acetaminophen   Tablet. 650 milliGRAM(s) Oral every 6 hours PRN Mild Pain (1 - 3)  traMADol 25 milliGRAM(s) Oral every 6 hours PRN Moderate Pain (4 - 6)      OBJECTIVE:    Vital Signs Last 24 Hrs  T(C): 36.4 (10 Apr 2018 05:50), Max: 36.6 (09 Apr 2018 08:55)  T(F): 97.6 (10 Apr 2018 05:50), Max: 97.8 (09 Apr 2018 08:55)  HR: 72 (10 Apr 2018 05:50) (60 - 73)  BP: 110/76 (10 Apr 2018 05:50) (110/76 - 125/75)  BP(mean): --  RR: 16 (10 Apr 2018 05:50) (16 - 18)  SpO2: 100% (10 Apr 2018 05:50) (100% - 100%)    CAPILLARY BLOOD GLUCOSE        I&O's Summary      PHYSICAL EXAM:  HEENT: NC/AT, Slightly dry mucous membranes  Neck: Supple  Respiratory: CTAB; No wheeze or rhonchi appreciated  Cardiovascular: RRR; +S1/S2  Gastrointestinal: +BS, Soft, NT, No rigidity  Genitourinary: No Del Valle, No suprapubic TTP  Extremities: No peripheral edema  Neurological: A+Ox3, L-sided facial droop which would improve somewhat when patient was distracted- waxes and wanes, PERRL, EOMI, Moves all extremities againstgravity, Subjective decreased sensation of R face, RUE, and RLE compared to L side (grossly unchanged from yesterday)  Skin: Warm and dry  Psychiatric: Flat affect      LABS:                        16.2   9.68  )-----------( 248      ( 10 Apr 2018 05:52 )             48.2     Auto Eosinophil # 0.09  / Auto Eosinophil % 0.9   / Auto Neutrophil # 5.38  / Auto Neutrophil % 55.6  / BANDS % x                            15.7   10.62 )-----------( 247      ( 09 Apr 2018 05:25 )             46.5     Auto Eosinophil # x     / Auto Eosinophil % x     / Auto Neutrophil # x     / Auto Neutrophil % x     / BANDS % x                            14.2   11.24 )-----------( 221      ( 08 Apr 2018 07:31 )             43.6     Auto Eosinophil # 0.04  / Auto Eosinophil % 0.4   / Auto Neutrophil # 6.08  / Auto Neutrophil % 54.0  / BANDS % x        04-09    140  |  101  |  14  ----------------------------<  70  3.6   |  25  |  0.94  04-08    143  |  104  |  10  ----------------------------<  86  3.8   |  27  |  0.99    Ca    9.0      09 Apr 2018 05:25  Mg     2.1     04-09  Phos  2.7     04-09  TPro  7.1  /  Alb  4.0  /  TBili  0.4  /  DBili  x   /  AST  23  /  ALT  44<H>  /  AlkPhos  71  04-09  TPro  6.3  /  Alb  3.8  /  TBili  0.3  /  DBili  x   /  AST  28  /  ALT  48<H>  /  AlkPhos  67  04-08      CARDIAC MARKERS ( 08 Apr 2018 07:31 )  x     / x     / 103 u/L / x     / x                RESPIRATORY  VENT:    ABG:     VBG:     RADIOLOGY & ADDITIONAL TESTS:  (Imaging Personally Reviewed)    Consultant(s) Notes Reviewed:      Care Discussed with Consultants/Other Providers:

## 2018-04-10 NOTE — PROGRESS NOTE ADULT - ASSESSMENT
22M with no PMHx who presents to the ED with L-sided facial droop possibly 2/2 Bell's Palsy vs conversion disorder, also with recent seizure-like activity concerning for epilepsy vs psychogenic non epileptic seizure.

## 2018-04-10 NOTE — PHYSICAL THERAPY INITIAL EVALUATION ADULT - MANUAL MUSCLE TESTING RESULTS, REHAB EVAL
Pt. inconsistent with MMT assessment. Grossly assessed; bilateral UE at least 4 or 5/5; bilateral LE at least 4 or 5/5 except right hip 3/5.

## 2018-04-10 NOTE — PROGRESS NOTE ADULT - ATTENDING COMMENTS
Patient seen and examined with neurology team and above note reviewed and I agree with assessment and plan as outlined. patient remains with bifacial weakness, difficult closing eyes and generalized muscle weakness. Exam shows poor effort on muscle testing and reflexes and sensation preserved.   We will proceed with checking serum for ACE, and would do NIF and VC every 8 hours and would proceed with spinal tap looking for cell count, glucose and protein, CSF cytology and lyme and ACE from CSF and HSV panel and autoimmune panel.  Continue supportive care and medical management and plan discussed with patient at bedside. All questions answered.

## 2018-04-11 LAB
APTT BLD: 32.4 SEC — SIGNIFICANT CHANGE UP (ref 27.5–37.4)
CLARITY CSF: CLEAR — SIGNIFICANT CHANGE UP
COLOR CSF: COLORLESS — SIGNIFICANT CHANGE UP
CSF PCR RESULT: SIGNIFICANT CHANGE UP
GLUCOSE CSF-MCNC: 82 MG/DL — HIGH (ref 40–70)
GRAM STN CSF: SIGNIFICANT CHANGE UP
INR BLD: 1.07 — SIGNIFICANT CHANGE UP (ref 0.88–1.17)
LYMPHOCYTES # CSF: 75 % — SIGNIFICANT CHANGE UP
MONOCYTES # CSF: 25 % — SIGNIFICANT CHANGE UP
NRBC NFR CSF: 1 CELL/UL — SIGNIFICANT CHANGE UP (ref 0–5)
PROT CSF-MCNC: 19.2 MG/DL — SIGNIFICANT CHANGE UP (ref 15–40)
PROTHROM AB SERPL-ACNC: 12.3 SEC — SIGNIFICANT CHANGE UP (ref 9.8–13.1)
RBC # CSF: 1 CELL/UL — HIGH (ref 0–0)
SPECIMEN SOURCE: SIGNIFICANT CHANGE UP
TOTAL CELLS COUNTED, SPINAL FLUID: 32 CELLS — SIGNIFICANT CHANGE UP
XANTHOCHROMIA: SIGNIFICANT CHANGE UP

## 2018-04-11 PROCEDURE — 99233 SBSQ HOSP IP/OBS HIGH 50: CPT

## 2018-04-11 PROCEDURE — 88108 CYTOPATH CONCENTRATE TECH: CPT | Mod: 26

## 2018-04-11 PROCEDURE — 62270 DX LMBR SPI PNXR: CPT | Mod: GC

## 2018-04-11 PROCEDURE — 99232 SBSQ HOSP IP/OBS MODERATE 35: CPT | Mod: GC

## 2018-04-11 PROCEDURE — 99232 SBSQ HOSP IP/OBS MODERATE 35: CPT

## 2018-04-11 RX ORDER — ONDANSETRON 8 MG/1
4 TABLET, FILM COATED ORAL THREE TIMES A DAY
Qty: 0 | Refills: 0 | Status: DISCONTINUED | OUTPATIENT
Start: 2018-04-11 | End: 2018-04-19

## 2018-04-11 RX ORDER — LIDOCAINE 4 G/100G
1 CREAM TOPICAL DAILY
Qty: 0 | Refills: 0 | Status: DISCONTINUED | OUTPATIENT
Start: 2018-04-11 | End: 2018-04-19

## 2018-04-11 RX ORDER — LIDOCAINE HCL 20 MG/ML
10 VIAL (ML) INJECTION ONCE
Qty: 0 | Refills: 0 | Status: DISCONTINUED | OUTPATIENT
Start: 2018-04-11 | End: 2018-04-19

## 2018-04-11 RX ADMIN — Medication 1 DROP(S): at 11:25

## 2018-04-11 RX ADMIN — LIDOCAINE 1 PATCH: 4 CREAM TOPICAL at 18:36

## 2018-04-11 RX ADMIN — Medication 1 DROP(S): at 22:58

## 2018-04-11 RX ADMIN — Medication 1 DROP(S): at 05:07

## 2018-04-11 RX ADMIN — PANTOPRAZOLE SODIUM 40 MILLIGRAM(S): 20 TABLET, DELAYED RELEASE ORAL at 06:54

## 2018-04-11 RX ADMIN — Medication 1 DROP(S): at 00:58

## 2018-04-11 RX ADMIN — LIDOCAINE 1 PATCH: 4 CREAM TOPICAL at 01:48

## 2018-04-11 RX ADMIN — TRAMADOL HYDROCHLORIDE 25 MILLIGRAM(S): 50 TABLET ORAL at 23:31

## 2018-04-11 RX ADMIN — VALACYCLOVIR 1000 MILLIGRAM(S): 500 TABLET, FILM COATED ORAL at 05:06

## 2018-04-11 RX ADMIN — TRAMADOL HYDROCHLORIDE 25 MILLIGRAM(S): 50 TABLET ORAL at 05:10

## 2018-04-11 RX ADMIN — Medication 1 DROP(S): at 17:20

## 2018-04-11 RX ADMIN — ONDANSETRON 4 MILLIGRAM(S): 8 TABLET, FILM COATED ORAL at 18:37

## 2018-04-11 RX ADMIN — Medication 40 MILLIGRAM(S): at 05:07

## 2018-04-11 RX ADMIN — TRAMADOL HYDROCHLORIDE 25 MILLIGRAM(S): 50 TABLET ORAL at 22:42

## 2018-04-11 RX ADMIN — TRAMADOL HYDROCHLORIDE 25 MILLIGRAM(S): 50 TABLET ORAL at 16:11

## 2018-04-11 RX ADMIN — Medication 1 DROP(S): at 17:21

## 2018-04-11 RX ADMIN — TRAMADOL HYDROCHLORIDE 25 MILLIGRAM(S): 50 TABLET ORAL at 17:10

## 2018-04-11 RX ADMIN — TRAMADOL HYDROCHLORIDE 25 MILLIGRAM(S): 50 TABLET ORAL at 05:56

## 2018-04-11 NOTE — PROGRESS NOTE ADULT - ATTENDING COMMENTS
Patient seen and examined with neurology team and above note reviewed and I agree with assessment and plan as outlined. Patient not showing signs of improvement with regards to facial weakness or generalized muscle weakness. Exam remains unchanged with regards to bilateral ptosis, facial weakness , poor ambulation and balance but preserved reflexes. Awaiting remainder of autoimmune and inflammatory workup and will pursue LP looking for increased protein or inflammatory changes. Continue medical management and supportive care and will also send for a paraneoplastic and autoimmune panel and all questions answered and he understood plan.

## 2018-04-11 NOTE — PROGRESS NOTE ADULT - PROBLEM SELECTOR PLAN 1
- presents with  4-day history of sudden-onset L facial droop a/w R-facial numbness, inability to close R-eye, and severe headache- thought to be 2/2 to bells palsy per outside hospital evaluation  - MRI of head negative and neurological exam does not correlate with physiolgoic pathology  - likely conversion disorder- psych recs appreciated- will obtain video eeg to ensure no epileptiform   - neuro recs appreciated will also obtain Acetylcholine R ab - presents with  4-day history of sudden-onset L facial droop a/w R-facial numbness, inability to close R-eye, and severe headache- thought to be 2/2 to bells palsy per outside hospital evaluation  - MRI of head negative and neurological exam does not correlate with physiolgoic pathology  - likely conversion disorder- psych recs appreciated- will obtain video eeg to ensure no epileptiform   - neuro recs appreciated will also obtain Acetylcholine R ab  - neuro recommends LP to rule out autoimmune disease - presents with  4-day history of sudden-onset L facial droop a/w R-facial numbness, inability to close R-eye, and severe headache- thought to be 2/2 to bells palsy per outside hospital evaluation  - MRI of head negative and neurological exam does not correlate with physiolgoic pathology  - ddx includes conversion disorder vs autoimmune process vs infection (per neuro)  - neuro recs will obtain Acetylcholine R ab and LP to rule out autoimmune disease - presents with  4-day history of sudden-onset L facial droop a/w R-facial numbness, inability to close R-eye, and severe headache- thought to be 2/2 to bells palsy per outside hospital evaluation  - MRI of head negative and neurological exam does not correlate with physiolgoic pathology  -s/p course of steroids w/o improvement  - ddx includes conversion disorder vs autoimmune process vs infection (per neuro)  - neuro recs will obtain Acetylcholine R ab and LP to rule out autoimmune disease - presents with  4-day history of sudden-onset L facial droop a/w R-facial numbness, inability to close R-eye, and severe headache- thought to be 2/2 to bells palsy per outside hospital evaluation  - MRI of head negative and neurological exam does not correlate with physiolgoic pathology  -s/p course of steroids and valtrex for bells palsy w/o improvement  - ddx includes conversion disorder vs autoimmune process vs infection (per neuro)  - neuro recs will obtain Acetylcholine R ab and LP to rule out autoimmune disease

## 2018-04-11 NOTE — PROGRESS NOTE BEHAVIORAL HEALTH - NSBHFUPINTERVALHXFT_PSY_A_CORE
No acute overnight events, no psychiatric PRNs given.  Pt seen at bedside, A&Ox4.  Pt appeared more awake and actively participated in conversation, answering questions in full sentences, compared to previous evaluations; however, on re-evaluation an hour later - with family member in room - pt was again depressed with constricted affect and provided minimal, mumbled responses.      On exam, pt still unable to sleep at night due to pain in, and lateral to, R eye, which he believes is due to inability to close R eyelid.  PRN pain medication did not make him feel better and he would like something to help him sleep at night.  Pt's mouth hurts when he chews and he felt nauseous when he tries to eat.  Pt continued to have generalized weakness and says he only walks to the bathroom.  Pt enjoyed visit from friends yesterday and says they are worried about him.  He is looking forward to going home and sleeping and having his sister and aunt cook for him.  Pt said medical team discusses plan for LP with him and he was hopeful it would yield more information about his condition. No acute overnight events, no psychiatric PRNs given.  Pt seen at bedside, A&Ox4.  Pt appeared more awake and actively participated in conversation, answering questions in full sentences, compared to previous evaluations; however, on re-evaluation an hour later - with family member in room - pt was again depressed with constricted affect and provided minimal, mumbled responses.      On exam, pt still unable to sleep at night due to pain in, and lateral to, R eye, which he believes is due to inability to close R eyelid.  Pt reports that PRN pain medication did not make him feel better and he would like something to help him sleep at night.  Pt states his mouth hurts when he chews and he felt nauseous when he tries to eat.  He endorses continued generalized weakness and says he only walks to the bathroom.  Pt enjoyed visit from friends yesterday and says they are worried about him.  He is looking forward to going home and sleeping and having his sister and aunt cook for him.  Pt said medical team discusses plan for LP with him and he was hopeful it would yield more information about his condition.

## 2018-04-11 NOTE — PROGRESS NOTE BEHAVIORAL HEALTH - DETAILS
right eye unable to close eyelid; pain at lateral aspect of R eye left lip numbness, difficulty chewing Nausea when attempting to eat right sided neck pain seizure activity; pain with R eye lateral gaze

## 2018-04-11 NOTE — PROGRESS NOTE ADULT - PROBLEM SELECTOR PLAN 7
- DVT PPx:  IMPROVE score= 0, no pharm  dvt ppx;  encourage ambulation  - diet- full liquid as patient states he cannot eat due to lack of mouth opening - DVT PPx:  IMPROVE score= 0, no pharm  dvt ppx;  encourage ambulation  - diet- full liquid as patient states he cannot eat due to lack of mouth opening with ensure supplements

## 2018-04-11 NOTE — PROGRESS NOTE ADULT - ASSESSMENT
22M with no PMHx who presents to the ED with L-sided facial droop possibly 2/2 Bell's Palsy vs conversion disorder, also with recent seizure-like activity concerning for epilepsy vs psychogenic non epileptic seizure. 22M with no PMHx who presents to the ED with L-sided facial droop possibly 2/2 Bell's Palsy vs conversion disorder, also with recent seizure-like activity concerning for epilepsy vs psychogenic non epileptic seizure, with normal MRI pending LP. 22M with no PMHx who presents to the ED with L-sided facial droop possibly 2/2 Bell's Palsy vs conversion disorder, also with recent seizure-like activity concerning for epilepsy vs psychogenic non epileptic seizure, s/p 7 day tx for [place] Palsy, with normal MRI pending LP.

## 2018-04-11 NOTE — PROGRESS NOTE ADULT - SUBJECTIVE AND OBJECTIVE BOX
Dixie Christy PGY 1  Pager: [phone]/ [phone]    Patient is a 22y old  Male who presents with a chief complaint of Facial droop, Seizure-like activity (08 Apr 2018 05:26)      SUBJECTIVE / OVERNIGHT EVENTS:    MEDICATIONS  (STANDING):  artificial  tears Solution 1 Drop(s) Both EYES two times a day  artificial tears (preservative free) Ophthalmic Solution 1 Drop(s) Right EYE four times a day  pantoprazole    Tablet 40 milliGRAM(s) Oral before breakfast    MEDICATIONS  (PRN):  acetaminophen   Tablet. 650 milliGRAM(s) Oral every 6 hours PRN Mild Pain (1 - 3)  traMADol 25 milliGRAM(s) Oral every 6 hours PRN Moderate Pain (4 - 6)      OBJECTIVE:    Vital Signs Last 24 Hrs  T(C): 36.8 (11 Apr 2018 05:09), Max: 37 (10 Apr 2018 21:33)  T(F): 98.3 (11 Apr 2018 05:09), Max: 98.6 (10 Apr 2018 21:33)  HR: 63 (11 Apr 2018 05:09) (60 - 65)  BP: 114/76 (11 Apr 2018 05:09) (111/69 - 114/76)  BP(mean): --  RR: 18 (11 Apr 2018 05:09) (18 - 18)  SpO2: 98% (11 Apr 2018 05:09) (98% - 98%)    CAPILLARY BLOOD GLUCOSE        I&O's Summary    10 Apr 2018 07:01  -  11 Apr 2018 07:00  --------------------------------------------------------  IN: 0 mL / OUT: 375 mL / NET: -375 mL        PHYSICAL EXAM:  GENERAL: NAD, well-developed  HEAD:  Atraumatic, Normocephalic  EYES: EOMI, PERRLA, conjunctiva and sclera clear  NECK: Supple, No JVD  CHEST/LUNG: Clear to auscultation bilaterally; No wheeze  HEART: Regular rate and rhythm; No murmurs, rubs, or gallops  ABDOMEN: Soft, Nontender, Nondistended; Bowel sounds present  EXTREMITIES:  2+ Peripheral Pulses, No clubbing, cyanosis, or edema  PSYCH: AAOx3  NEUROLOGY: non-focal  SKIN: No rashes or lesions    LABS:                        16.2   9.68  )-----------( 248      ( 10 Apr 2018 05:52 )             48.2     Auto Eosinophil # 0.09  / Auto Eosinophil % 0.9   / Auto Neutrophil # 5.38  / Auto Neutrophil % 55.6  / BANDS % x        04-10    138  |  98  |  16  ----------------------------<  75  3.8   |  27  |  1.02    Ca    9.1      10 Apr 2018 05:52  TPro  7.0  /  Alb  4.1  /  TBili  0.4  /  DBili  x   /  AST  19  /  ALT  40  /  AlkPhos  75  04-10                RESPIRATORY  VENT:    ABG:     VBG:     RADIOLOGY & ADDITIONAL TESTS:  (Imaging Personally Reviewed)    Consultant(s) Notes Reviewed:      Care Discussed with Consultants/Other Providers: Dixie Christy PGY 1  Pager: [phone]/ [phone]    Patient is a 22y old  Male who presents with a chief complaint of Facial droop, Seizure-like activity (08 Apr 2018 05:26)      SUBJECTIVE / OVERNIGHT EVENTS:    MEDICATIONS  (STANDING):  artificial  tears Solution 1 Drop(s) Both EYES two times a day  artificial tears (preservative free) Ophthalmic Solution 1 Drop(s) Right EYE four times a day  pantoprazole    Tablet 40 milliGRAM(s) Oral before breakfast    MEDICATIONS  (PRN):  acetaminophen   Tablet. 650 milliGRAM(s) Oral every 6 hours PRN Mild Pain (1 - 3)  traMADol 25 milliGRAM(s) Oral every 6 hours PRN Moderate Pain (4 - 6)      OBJECTIVE:    Vital Signs Last 24 Hrs  T(C): 36.8 (11 Apr 2018 05:09), Max: 37 (10 Apr 2018 21:33)  T(F): 98.3 (11 Apr 2018 05:09), Max: 98.6 (10 Apr 2018 21:33)  HR: 63 (11 Apr 2018 05:09) (60 - 65)  BP: 114/76 (11 Apr 2018 05:09) (111/69 - 114/76)  BP(mean): --  RR: 18 (11 Apr 2018 05:09) (18 - 18)  SpO2: 98% (11 Apr 2018 05:09) (98% - 98%)    CAPILLARY BLOOD GLUCOSE        I&O's Summary    10 Apr 2018 07:01  -  11 Apr 2018 07:00  --------------------------------------------------------  IN: 0 mL / OUT: 375 mL / NET: -375 mL        PHYSICAL EXAM:  GEN: NAD  HEENT: NC/AT,  Supple neck  Respiratory: CTAB; No wheeze or rhonchi appreciated  Cardiovascular: RRR; +S1/S2  Gastrointestinal: +BS, Soft, NT, No rigidity  Extremities: No peripheral edema  Neurological: A+Ox3, L-sided facial droop which would improve somewhat with distraction\,  PERRL, EOMI, Moves all extremities against gravity Subjective decreased sensation of R face, RUE, and RLE compared to L side (grossly unchanged from yesterday)  Skin: Warm and dry  Psychiatric: Flat affect      LABS:                        16.2   9.68  )-----------( 248      ( 10 Apr 2018 05:52 )             48.2     Auto Eosinophil # 0.09  / Auto Eosinophil % 0.9   / Auto Neutrophil # 5.38  / Auto Neutrophil % 55.6  / BANDS % x        04-10    138  |  98  |  16  ----------------------------<  75  3.8   |  27  |  1.02    Ca    9.1      10 Apr 2018 05:52  TPro  7.0  /  Alb  4.1  /  TBili  0.4  /  DBili  x   /  AST  19  /  ALT  40  /  AlkPhos  75  04-10 Dixie Westley PGY 1  Pager: [phone]/ [phone]    Patient is a 22y old  Male who presents with a chief complaint of Facial droop, Seizure-like activity (08 Apr 2018 05:26)    SUBJECTIVE / OVERNIGHT EVENTS:  Patient seen and examined at bedside with no acute events overnight. Patient reports pain in his neck and difficulty swallowing.      MEDICATIONS  (STANDING):  artificial  tears Solution 1 Drop(s) Both EYES two times a day  artificial tears (preservative free) Ophthalmic Solution 1 Drop(s) Right EYE four times a day  pantoprazole    Tablet 40 milliGRAM(s) Oral before breakfast    MEDICATIONS  (PRN):  acetaminophen   Tablet. 650 milliGRAM(s) Oral every 6 hours PRN Mild Pain (1 - 3)  traMADol 25 milliGRAM(s) Oral every 6 hours PRN Moderate Pain (4 - 6)      OBJECTIVE:    Vital Signs Last 24 Hrs  T(C): 36.8 (11 Apr 2018 05:09), Max: 37 (10 Apr 2018 21:33)  T(F): 98.3 (11 Apr 2018 05:09), Max: 98.6 (10 Apr 2018 21:33)  HR: 63 (11 Apr 2018 05:09) (60 - 65)  BP: 114/76 (11 Apr 2018 05:09) (111/69 - 114/76)  BP(mean): --  RR: 18 (11 Apr 2018 05:09) (18 - 18)  SpO2: 98% (11 Apr 2018 05:09) (98% - 98%)    CAPILLARY BLOOD GLUCOSE        I&O's Summary    10 Apr 2018 07:01  -  11 Apr 2018 07:00  --------------------------------------------------------  IN: 0 mL / OUT: 375 mL / NET: -375 mL        PHYSICAL EXAM:  GEN: NAD  HEENT: NC/AT,  Supple neck  Respiratory: CTAB; No wheeze or rhonchi appreciated  Cardiovascular: RRR; +S1/S2  Gastrointestinal: +BS, Soft, NT, No rigidity  Extremities: No peripheral edema  Neurological: A+Ox3, L-sided facial droop which would improve somewhat with distraction\,  PERRL, EOMI, Moves all extremities against gravity Subjective decreased sensation of R face, RUE, and RLE compared to L side (grossly unchanged from yesterday)  Skin: Warm and dry  Psychiatric: Flat affect      LABS:                        16.2   9.68  )-----------( 248      ( 10 Apr 2018 05:52 )             48.2     Auto Eosinophil # 0.09  / Auto Eosinophil % 0.9   / Auto Neutrophil # 5.38  / Auto Neutrophil % 55.6  / BANDS % x        04-10    138  |  98  |  16  ----------------------------<  75  3.8   |  27  |  1.02    Ca    9.1      10 Apr 2018 05:52  TPro  7.0  /  Alb  4.1  /  TBili  0.4  /  DBili  x   /  AST  19  /  ALT  40  /  AlkPhos  75  04-10

## 2018-04-11 NOTE — PROGRESS NOTE ADULT - PROBLEM SELECTOR PLAN 4
- Patient noted to have a leukocytosis on presentation (WBC = 12.56)- trending down  - likely in setting of steroid use   - Monitor CBC - Patient noted to have a leukocytosis on presentation (WBC = 12.56)- trending down  - likely in setting of steroid use

## 2018-04-11 NOTE — PROGRESS NOTE ADULT - SUBJECTIVE AND OBJECTIVE BOX
Neurology Follow up note    Name  BRITNEY GARCIA        Subjective: Saw and examined patient at bedside. In no acute distress.     MEDICATIONS  (STANDING):  artificial  tears Solution 1 Drop(s) Both EYES two times a day  artificial tears (preservative free) Ophthalmic Solution 1 Drop(s) Right EYE four times a day  pantoprazole    Tablet 40 milliGRAM(s) Oral before breakfast    MEDICATIONS  (PRN):  acetaminophen   Tablet. 650 milliGRAM(s) Oral every 6 hours PRN Mild Pain (1 - 3)  traMADol 25 milliGRAM(s) Oral every 6 hours PRN Moderate Pain (4 - 6)      Allergies    No Known Allergies    Intolerances        Objective:   Vital Signs Last 24 Hrs  T(C): 36.8 (11 Apr 2018 05:09), Max: 37 (10 Apr 2018 21:33)  T(F): 98.3 (11 Apr 2018 05:09), Max: 98.6 (10 Apr 2018 21:33)  HR: 63 (11 Apr 2018 05:09) (60 - 65)  BP: 114/76 (11 Apr 2018 05:09) (111/69 - 114/76)  BP(mean): --  RR: 18 (11 Apr 2018 05:09) (18 - 18)  SpO2: 98% (11 Apr 2018 05:09) (98% - 98%)    General Exam:   General appearance: No acute distress                   Neuro exam:  Mental Status: Orientated to self, date and place.  Attention intact.  No dysarthria, aphasia or neglect.  Cranial Nerves: PERRL, EOMI, subjective numbness on right side facial asymmetry - disappear during distraction , Tongue, uvula and palate midline.    Motor: states he is weak throughout but no drift x4  Tone: normal.                          Sensation: intact to light touch b/l   Deep Tendon Reflexes: 2+ bilateral biceps, triceps, brachioradialis, knee and ankle  Toes flexor bilaterally  Coord: FTN intact b/l  Gait: slow, unsteady but able to stand    Other:    04-10    138  |  98  |  16  ----------------------------<  75  3.8   |  27  |  1.02    Ca    9.1      10 Apr 2018 05:52    TPro  7.0  /  Alb  4.1  /  TBili  0.4  /  DBili  x   /  AST  19  /  ALT  40  /  AlkPhos  75  04-10    04-10    138  |  98  |  16  ----------------------------<  75  3.8   |  27  |  1.02    Ca    9.1      10 Apr 2018 05:52    TPro  7.0  /  Alb  4.1  /  TBili  0.4  /  DBili  x   /  AST  19  /  ALT  40  /  AlkPhos  75  04-10    LIVER FUNCTIONS - ( 10 Apr 2018 05:52 )  Alb: 4.1 g/dL / Pro: 7.0 g/dL / ALK PHOS: 75 u/L / ALT: 40 u/L / AST: 19 u/L / GGT: x             Radiology  MRI brain:  IMPRESSION: Unremarkable MRI of the brain. Neurology Follow up note    Name  BRITNEY GARCIA    Subjective: Saw and examined patient at bedside. In no acute distress.     MEDICATIONS  (STANDING):  artificial  tears Solution 1 Drop(s) Both EYES two times a day  artificial tears (preservative free) Ophthalmic Solution 1 Drop(s) Right EYE four times a day  pantoprazole    Tablet 40 milliGRAM(s) Oral before breakfast    MEDICATIONS  (PRN):  acetaminophen   Tablet. 650 milliGRAM(s) Oral every 6 hours PRN Mild Pain (1 - 3)  traMADol 25 milliGRAM(s) Oral every 6 hours PRN Moderate Pain (4 - 6)      Allergies    No Known Allergies    Intolerances        Objective:   Vital Signs Last 24 Hrs  T(C): 36.8 (11 Apr 2018 05:09), Max: 37 (10 Apr 2018 21:33)  T(F): 98.3 (11 Apr 2018 05:09), Max: 98.6 (10 Apr 2018 21:33)  HR: 63 (11 Apr 2018 05:09) (60 - 65)  BP: 114/76 (11 Apr 2018 05:09) (111/69 - 114/76)  BP(mean): --  RR: 18 (11 Apr 2018 05:09) (18 - 18)  SpO2: 98% (11 Apr 2018 05:09) (98% - 98%)    General Exam:   General appearance: No acute distress                   Neuro exam:  Mental Status: Orientated to self, date and place.  Attention intact.  mild dysarthria, no aphasia or neglect.  Cranial Nerves: PERRL, EOMI, subjective numbness on right w/ right-sided facial asymmetry more persistent, Tongue, uvula and palate midline.    Motor: no drift x4  Tone: normal.                          Sensation: intact to light touch b/l   Deep Tendon Reflexes: 2+ bilateral biceps, triceps, brachioradialis, knee and ankle  Toes flexor bilaterally  Coord: FTN intact b/l  Gait: slow, unsteady     Other:    04-10    138  |  98  |  16  ----------------------------<  75  3.8   |  27  |  1.02    Ca    9.1      10 Apr 2018 05:52    TPro  7.0  /  Alb  4.1  /  TBili  0.4  /  DBili  x   /  AST  19  /  ALT  40  /  AlkPhos  75  04-10    04-10    138  |  98  |  16  ----------------------------<  75  3.8   |  27  |  1.02    Ca    9.1      10 Apr 2018 05:52    TPro  7.0  /  Alb  4.1  /  TBili  0.4  /  DBili  x   /  AST  19  /  ALT  40  /  AlkPhos  75  04-10    LIVER FUNCTIONS - ( 10 Apr 2018 05:52 )  Alb: 4.1 g/dL / Pro: 7.0 g/dL / ALK PHOS: 75 u/L / ALT: 40 u/L / AST: 19 u/L / GGT: x             Radiology  MRI brain:  IMPRESSION: Unremarkable MRI of the brain.

## 2018-04-11 NOTE — PROGRESS NOTE BEHAVIORAL HEALTH - SUMMARY
Patient is a 23 y/o male from [facility], immigrated to [place] 3.5 y/a, employed as a [employer] , single, childless, domiciled with aunt, sister and her , with no significant PMHx or PPHx, no history of psychiatric hospitalizations, and no history of suicidality, admitted with seizure-like activity. Psychiatry consulted due to concerns regarding conversion disorder.    This morning, pt continues to complain of fatigue, pain in R neck, inability to close R eyelid, L facial droop, difficulty opening mouth, and generalized weakness.  Pt mood and affect appeared improved in AM prior to arrival of family member.  Pt continues to lack any mood or psychotic symptoms, is future oriented and hopeful and does not meet criteria for in-pt psychiatric hospitalization.     Please obtain VEEG prior to discharge. Patient is a 23 y/o male from [facility], immigrated to [place] 3.5 y/a, employed as a [employer] , single, childless, domiciled with aunt, sister and her , with no significant PMHx or PPHx, no history of psychiatric hospitalizations, and no history of suicidality, admitted with seizure-like activity. Psychiatry consulted due to concerns regarding conversion disorder.    This morning, pt continues to complain of fatigue, pain in R neck, inability to close R eyelid, L facial droop, difficulty opening mouth, and generalized weakness.  Pt mood and affect appeared improved in AM prior to arrival of family member.  Pt continues to lack any mood or psychotic symptoms, is future oriented and hopeful and does not meet criteria for in-pt psychiatric hospitalization.     Please obtain VEEG

## 2018-04-11 NOTE — PROGRESS NOTE ADULT - ASSESSMENT
22 M no PMH p/w facial droop, gen weakness, headache and seizure like activity.    considering conversion disorder vs Factitious vs Malingering for  facial symptoms    Seizure like activity could be Epilepsy vs Pseudoseizure     MRI Brain unremarkable,  Lyme neg, and HIV neg    Plan:  check acetylcholine antibodies, ACE  Lumber puncture-  cell count, glucose and protein, CSF cytology and lyme and ACE from CSF and HSV panel and autoimmune panel.    Case seen and discussed with Dr. Martinez 22 M no PMH p/w facial droop, gen weakness, headache and seizure like activity.    considering conversion disorder vs Factitious vs Malingering for  facial symptoms    Seizure like activity could be Epilepsy vs Pseudoseizure     MRI Brain unremarkable,  Lyme neg, and HIV neg    Plan:  check acetylcholine antibodies, ACE  Lumbar puncture-  cell count, glucose and protein, CSF cytology and lyme and ACE from CSF and HSV panel and autoimmune panel.    Case seen and discussed with Dr. Martinez

## 2018-04-11 NOTE — PROGRESS NOTE BEHAVIORAL HEALTH - NSBHCONSULTFOLLOWAFTERCARE_PSY_A_CORE FT
[facility] walk in clinic : [phone] (9AM-7PM)  [facility] Outpatient clinic: [phone]

## 2018-04-11 NOTE — PROCEDURE NOTE - ADDITIONAL PROCEDURE DETAILS
A time-out was performed, verifying patient identification, procedure, site, and positioning.  Patient was positioned in the latera recumbent positioning, prepped and draped in the usual sterile fashion. Providers donned sterile gloves, gown/mask/cap.   The L4/L5 interspace was located using the iliac crests as landmarks. 8 cc 1% lidocaine was used to anesthetize the area.     A 22 gauge spinal needle, bevel facing R side of body, was introduced into the arachnoid space, the stylet was  removed with appropriate fluid return. After adequate fluid was collected stylet was reinserted and needle was removed.    Blood loss was negligible.     Opening pressure: 13.5 cmHg  Appearance of Fluid: clear CSF   Volume removed: 8.0 ml  Patient tolerated the procedure well and there were no complications.

## 2018-04-12 LAB — ACE SERPL-CCNC: 46 U/L — SIGNIFICANT CHANGE UP (ref 14–82)

## 2018-04-12 PROCEDURE — 99232 SBSQ HOSP IP/OBS MODERATE 35: CPT

## 2018-04-12 RX ORDER — IBUPROFEN 200 MG
400 TABLET ORAL EVERY 8 HOURS
Qty: 0 | Refills: 0 | Status: DISCONTINUED | OUTPATIENT
Start: 2018-04-12 | End: 2018-04-19

## 2018-04-12 RX ADMIN — Medication 1 DROP(S): at 17:27

## 2018-04-12 RX ADMIN — Medication 1 DROP(S): at 06:20

## 2018-04-12 RX ADMIN — TRAMADOL HYDROCHLORIDE 25 MILLIGRAM(S): 50 TABLET ORAL at 17:25

## 2018-04-12 RX ADMIN — ONDANSETRON 4 MILLIGRAM(S): 8 TABLET, FILM COATED ORAL at 11:17

## 2018-04-12 RX ADMIN — LIDOCAINE 1 PATCH: 4 CREAM TOPICAL at 06:21

## 2018-04-12 RX ADMIN — LIDOCAINE 1 PATCH: 4 CREAM TOPICAL at 13:07

## 2018-04-12 RX ADMIN — Medication 650 MILLIGRAM(S): at 12:00

## 2018-04-12 RX ADMIN — Medication 400 MILLIGRAM(S): at 14:00

## 2018-04-12 RX ADMIN — ONDANSETRON 4 MILLIGRAM(S): 8 TABLET, FILM COATED ORAL at 19:09

## 2018-04-12 RX ADMIN — Medication 1 DROP(S): at 12:09

## 2018-04-12 RX ADMIN — Medication 400 MILLIGRAM(S): at 13:07

## 2018-04-12 RX ADMIN — TRAMADOL HYDROCHLORIDE 25 MILLIGRAM(S): 50 TABLET ORAL at 06:20

## 2018-04-12 RX ADMIN — Medication 1 DROP(S): at 17:26

## 2018-04-12 RX ADMIN — Medication 650 MILLIGRAM(S): at 11:17

## 2018-04-12 RX ADMIN — TRAMADOL HYDROCHLORIDE 25 MILLIGRAM(S): 50 TABLET ORAL at 18:15

## 2018-04-12 RX ADMIN — TRAMADOL HYDROCHLORIDE 25 MILLIGRAM(S): 50 TABLET ORAL at 07:15

## 2018-04-12 RX ADMIN — PANTOPRAZOLE SODIUM 40 MILLIGRAM(S): 20 TABLET, DELAYED RELEASE ORAL at 06:20

## 2018-04-12 NOTE — PROGRESS NOTE ADULT - SUBJECTIVE AND OBJECTIVE BOX
Dixie Christy PGY 1  Pager: [phone]/ [phone]    Patient is a 22y old  Male who presents with a chief complaint of Facial droop, Seizure-like activity (08 Apr 2018 05:26)    SUBJECTIVE / OVERNIGHT EVENTS:  Patient seen and examined at bedside with no acute events overnight. Patient underwent LP on 4/11 without any complications.    MEDICATIONS  (STANDING):  artificial  tears Solution 1 Drop(s) Both EYES two times a day  artificial tears (preservative free) Ophthalmic Solution 1 Drop(s) Right EYE four times a day  lidocaine   Patch 1 Patch Transdermal daily  lidocaine 1% (Preservative-free) Injectable 10 milliLiter(s) Local Injection once  pantoprazole    Tablet 40 milliGRAM(s) Oral before breakfast    MEDICATIONS  (PRN):  acetaminophen   Tablet. 650 milliGRAM(s) Oral every 6 hours PRN Mild Pain (1 - 3)  ondansetron   Disintegrating Tablet 4 milliGRAM(s) Oral three times a day PRN Nausea and/or Vomiting  traMADol 25 milliGRAM(s) Oral every 6 hours PRN Moderate Pain (4 - 6)      OBJECTIVE:    Vital Signs Last 24 Hrs  T(C): 36.3 (12 Apr 2018 05:23), Max: 36.4 (11 Apr 2018 14:11)  T(F): 97.4 (12 Apr 2018 05:23), Max: 97.5 (11 Apr 2018 14:11)  HR: 73 (12 Apr 2018 05:23) (54 - 78)  BP: 98/60 (12 Apr 2018 05:23) (98/60 - 113/71)  BP(mean): --  RR: 18 (12 Apr 2018 05:23) (18 - 18)  SpO2: 96% (12 Apr 2018 05:23) (96% - 100%)    CAPILLARY BLOOD GLUCOSE        I&O's Summary    11 Apr 2018 07:01  -  12 Apr 2018 07:00  --------------------------------------------------------  IN: 0 mL / OUT: 500 mL / NET: -500 mL        PHYSICAL EXAM:  GENERAL: NAD, well-developed  HEAD:  Atraumatic, Normocephalic  EYES: EOMI, PERRLA, conjunctiva and sclera clear  NECK: Supple, No JVD  CHEST/LUNG: Clear to auscultation bilaterally; No wheeze  HEART: Regular rate and rhythm; No murmurs, rubs, or gallops  ABDOMEN: Soft, Nontender, Nondistended; Bowel sounds present  EXTREMITIES:  2+ Peripheral Pulses, No clubbing, cyanosis, or edema  PSYCH: AAOx3  NEUROLOGY: A+Ox3, L-sided facial droop which changes during conversation,  PERRL, EOMI, Moves all extremities against gravity, subjective decreased sensation of R face, RUE, and RLE compared to L side (improved from yesterday)  SKIN: No rashes or lesions    LABS:        PT/INR - ( 11 Apr 2018 15:20 )   PT: 12.3 SEC;   INR: 1.07          PTT - ( 11 Apr 2018 15:20 )  PTT:32.4 SEC

## 2018-04-12 NOTE — PROGRESS NOTE ADULT - PROBLEM SELECTOR PLAN 1
- presents with  4-day history of sudden-onset L facial droop a/w R-facial numbness, inability to close R-eye, and severe headache- thought to be 2/2 to bells palsy per outside hospital evaluation  - MRI of head negative and neurological exam does not correlate with physiologic pathology  -s/p course of steroids and valtrex for bells palsy w/o improvement  - ddx includes conversion disorder vs autoimmune process vs infection  - s/p LP on 4/11  - follow up results

## 2018-04-12 NOTE — PROGRESS NOTE BEHAVIORAL HEALTH - NSBHCONSULTFOLLOWAFTERCARE_PSY_A_CORE FT
[facility] walk in clinic : [phone] (9AM-7PM)  [facility] Outpatient clinic: [phone]

## 2018-04-12 NOTE — PROGRESS NOTE ADULT - ASSESSMENT
22M with no PMHx who presents to the ED with L-sided facial droop possibly 2/2 Bell's Palsy vs conversion disorder vs auotimmune neuro disorder?, also with recent seizure-like activity concerning for epilepsy vs psychogenic non epileptic seizure, s/p 7 day tx for [place] Palsy, with normal MRI pending LP. 22M with no PMHx who presents to the ED with L-sided facial droop possibly 2/2 Bell's Palsy vs conversion disorder vs auotimmune neuro disorder?, also with recent seizure-like activity concerning for epilepsy vs psychogenic non epileptic seizure, s/p 7 day tx for [place] Palsy, with normal MRI now s/p LP yesterday 4/11.

## 2018-04-12 NOTE — PROGRESS NOTE BEHAVIORAL HEALTH - NSBHFUPINTERVALHXFT_PSY_A_CORE
No acute overnight events, no psychiatric PRNs given.  Pt seen at bedside, initially sleeping but easily arousable to voice, A&Ox4.  While pt was sleeping, face was noted to be symmetrical.  Pt was depressed with constricted affect and provided minimal, mumbled responses.  Pt said he got something for sleep which helped, but he still had pain in his right eye and headache. At the conversation progressed, pt's lower lip deviation to left became more pronounced and b/l eyes appeared to spontaneously roll back in his head (which has been observed on prior evaluations).  Pt stated when he talks for more than five minutes it become difficult for him to breathe.  His brother and uncle were coming to visit him today; however, he was not looking forward to their visit, though he would not say while.  Pt still feels generalized weakness in his body and now requires assistance to walk to the bathroom.  However, he says his mood is still "a little happy." No acute overnight events, no psychiatric PRNs given.  Pt seen at bedside, initially sleeping but easily arousable to voice, A&Ox4.  While pt was sleeping, face was noted to be symmetrical.  Pt was depressed with constricted affect and provided minimal, mumbled responses.  Pt said he got something for sleep which helped, but he still had pain in his right eye and headache. At the conversation progressed, pt's lower lip deviation to left became more pronounced and b/l eyes appeared to spontaneously roll back in his head (which has been observed on prior evaluations).  Pt stated when he talks for more than five minutes it become difficult for him to breathe.  His brother and uncle were coming to visit him today; however, he was not looking forward to their visit, though he would not say while.  Pt still feels generalized weakness in his body and now requires assistance to walk to the bathroom.  However, he says his mood is still "a little happy."  When asked about his court hearing for immigrations yesterday, he said he was given a new court date in one year.

## 2018-04-12 NOTE — PROGRESS NOTE ADULT - PROBLEM SELECTOR PLAN 2
- Patient reported to have 3 episodes of whole-body shaking followed by unresponsiveness prior to hospitalization  - none witnessed during hospitalization  - neuro recs appreciated  - possible non psychogenic seizure - Patient reported to have 3 episodes of whole-body shaking followed by unresponsiveness prior to hospitalization  - none witnessed during hospitalization  - neuro recs appreciated  - possible non psychogenic seizure  -f/u VEEG

## 2018-04-12 NOTE — PROGRESS NOTE ADULT - PROBLEM SELECTOR PLAN 7
- DVT PPx:  IMPROVE score= 0, no pharm  dvt ppx  - diet- full liquid as patient states he cannot eat due to lack of mouth opening with ensure supplements

## 2018-04-12 NOTE — PROGRESS NOTE ADULT - PROBLEM SELECTOR PLAN 4
- Patient noted to have a leukocytosis on presentation (WBC = 12.56)- trending down  - likely in setting of steroid use Resolved on 4/10 labs   - Patient noted to have a leukocytosis on presentation (WBC = 12.56)- trending down  - likely in setting of steroid use

## 2018-04-12 NOTE — PROGRESS NOTE BEHAVIORAL HEALTH - SUMMARY
Patient is a 21 y/o male from [facility], immigrated to [place] 3.5 y/a, employed as a [employer] , single, childless, domiciled with aunt, sister and her , with no significant PMHx or PPHx, no history of psychiatric hospitalizations, and no history of suicidality, admitted with seizure-like activity. Psychiatry consulted due to concerns regarding conversion disorder.    This morning, pt continues to complain of fatigue, pain in R neck, inability to close R eyelid, L facial droop, difficulty opening mouth, and generalized weakness.  Pt mood and affect appeared improved in AM prior to arrival of family member.  Pt continues to lack any mood or psychotic symptoms, is future oriented and hopeful and does not meet criteria for in-pt psychiatric hospitalization.     Please obtain VEEG Patient is a 23 y/o male from [facility], immigrated to [place] 3.5 y/a, employed as a [employer] , single, childless, domiciled with aunt, sister and her , with no significant PMHx or PPHx, no history of psychiatric hospitalizations, and no history of suicidality, admitted with seizure-like activity. Psychiatry consulted due to concerns regarding conversion disorder.    This morning, pt continues to complain of fatigue, inability to close R eyelid, pain around right eye, L facial droop, difficulty opening mouth, and generalized weakness.  Pt also complained of difficulty breathing after talking for more than 5 minutes.  Pt mood and affect appeared depressed and constricted.  Pt continues to lack any mood or psychotic symptoms, is future oriented and hopeful and does not meet criteria for in-pt psychiatric hospitalization.     Please obtain VEEG

## 2018-04-12 NOTE — PROGRESS NOTE BEHAVIORAL HEALTH - DETAILS
right eye unable to close eyelid; pain at lateral aspect of R eye left lip numbness, difficulty chewing seizure activity; pain with R eye lateral gaze

## 2018-04-12 NOTE — PROGRESS NOTE ADULT - ATTENDING COMMENTS
Pt s/p LP yesterday w/ grossly unremarkable results. Pending results of further LP labs. Will f/u neuro.

## 2018-04-13 DIAGNOSIS — R06.02 SHORTNESS OF BREATH: ICD-10-CM

## 2018-04-13 DIAGNOSIS — R13.10 DYSPHAGIA, UNSPECIFIED: ICD-10-CM

## 2018-04-13 LAB
ALBUMIN SERPL ELPH-MCNC: 3.9 G/DL — SIGNIFICANT CHANGE UP (ref 3.3–5)
ALP SERPL-CCNC: 75 U/L — SIGNIFICANT CHANGE UP (ref 40–120)
ALT FLD-CCNC: 26 U/L — SIGNIFICANT CHANGE UP (ref 4–41)
AST SERPL-CCNC: 15 U/L — SIGNIFICANT CHANGE UP (ref 4–40)
BILIRUB SERPL-MCNC: 0.5 MG/DL — SIGNIFICANT CHANGE UP (ref 0.2–1.2)
BUN SERPL-MCNC: 19 MG/DL — SIGNIFICANT CHANGE UP (ref 7–23)
CALCIUM SERPL-MCNC: 8.7 MG/DL — SIGNIFICANT CHANGE UP (ref 8.4–10.5)
CHLORIDE SERPL-SCNC: 101 MMOL/L — SIGNIFICANT CHANGE UP (ref 98–107)
CO2 SERPL-SCNC: 24 MMOL/L — SIGNIFICANT CHANGE UP (ref 22–31)
CREAT SERPL-MCNC: 1 MG/DL — SIGNIFICANT CHANGE UP (ref 0.5–1.3)
GLUCOSE SERPL-MCNC: 137 MG/DL — HIGH (ref 70–99)
HCT VFR BLD CALC: 47.5 % — SIGNIFICANT CHANGE UP (ref 39–50)
HGB BLD-MCNC: 16 G/DL — SIGNIFICANT CHANGE UP (ref 13–17)
MCHC RBC-ENTMCNC: 29.9 PG — SIGNIFICANT CHANGE UP (ref 27–34)
MCHC RBC-ENTMCNC: 33.7 % — SIGNIFICANT CHANGE UP (ref 32–36)
MCV RBC AUTO: 88.6 FL — SIGNIFICANT CHANGE UP (ref 80–100)
NRBC # FLD: 0 — SIGNIFICANT CHANGE UP
PLATELET # BLD AUTO: 237 K/UL — SIGNIFICANT CHANGE UP (ref 150–400)
PMV BLD: 11.1 FL — SIGNIFICANT CHANGE UP (ref 7–13)
POTASSIUM SERPL-MCNC: 3.5 MMOL/L — SIGNIFICANT CHANGE UP (ref 3.5–5.3)
POTASSIUM SERPL-SCNC: 3.5 MMOL/L — SIGNIFICANT CHANGE UP (ref 3.5–5.3)
PROT SERPL-MCNC: 7 G/DL — SIGNIFICANT CHANGE UP (ref 6–8.3)
RBC # BLD: 5.36 M/UL — SIGNIFICANT CHANGE UP (ref 4.2–5.8)
RBC # FLD: 12 % — SIGNIFICANT CHANGE UP (ref 10.3–14.5)
SODIUM SERPL-SCNC: 140 MMOL/L — SIGNIFICANT CHANGE UP (ref 135–145)
VDRL CSF-TITR: NEGATIVE — SIGNIFICANT CHANGE UP
WBC # BLD: 8.81 K/UL — SIGNIFICANT CHANGE UP (ref 3.8–10.5)
WBC # FLD AUTO: 8.81 K/UL — SIGNIFICANT CHANGE UP (ref 3.8–10.5)

## 2018-04-13 PROCEDURE — 93010 ELECTROCARDIOGRAM REPORT: CPT

## 2018-04-13 PROCEDURE — 71045 X-RAY EXAM CHEST 1 VIEW: CPT | Mod: 26

## 2018-04-13 PROCEDURE — 99232 SBSQ HOSP IP/OBS MODERATE 35: CPT

## 2018-04-13 PROCEDURE — 99233 SBSQ HOSP IP/OBS HIGH 50: CPT

## 2018-04-13 RX ORDER — ONDANSETRON 8 MG/1
4 TABLET, FILM COATED ORAL ONCE
Qty: 0 | Refills: 0 | Status: COMPLETED | OUTPATIENT
Start: 2018-04-13 | End: 2018-04-13

## 2018-04-13 RX ORDER — SODIUM CHLORIDE 9 MG/ML
1000 INJECTION, SOLUTION INTRAVENOUS
Qty: 0 | Refills: 0 | Status: DISCONTINUED | OUTPATIENT
Start: 2018-04-13 | End: 2018-04-17

## 2018-04-13 RX ORDER — KETOROLAC TROMETHAMINE 30 MG/ML
15 SYRINGE (ML) INJECTION ONCE
Qty: 0 | Refills: 0 | Status: DISCONTINUED | OUTPATIENT
Start: 2018-04-13 | End: 2018-04-13

## 2018-04-13 RX ORDER — ACETAMINOPHEN 500 MG
1000 TABLET ORAL ONCE
Qty: 0 | Refills: 0 | Status: COMPLETED | OUTPATIENT
Start: 2018-04-13 | End: 2018-04-13

## 2018-04-13 RX ADMIN — LIDOCAINE 1 PATCH: 4 CREAM TOPICAL at 23:30

## 2018-04-13 RX ADMIN — Medication 15 MILLIGRAM(S): at 22:59

## 2018-04-13 RX ADMIN — PANTOPRAZOLE SODIUM 40 MILLIGRAM(S): 20 TABLET, DELAYED RELEASE ORAL at 07:04

## 2018-04-13 RX ADMIN — Medication 15 MILLIGRAM(S): at 01:16

## 2018-04-13 RX ADMIN — Medication 1000 MILLIGRAM(S): at 02:30

## 2018-04-13 RX ADMIN — Medication 400 MILLIGRAM(S): at 02:18

## 2018-04-13 RX ADMIN — Medication 15 MILLIGRAM(S): at 01:04

## 2018-04-13 RX ADMIN — LIDOCAINE 1 PATCH: 4 CREAM TOPICAL at 01:15

## 2018-04-13 RX ADMIN — Medication 400 MILLIGRAM(S): at 12:42

## 2018-04-13 RX ADMIN — Medication 15 MILLIGRAM(S): at 22:44

## 2018-04-13 RX ADMIN — ONDANSETRON 4 MILLIGRAM(S): 8 TABLET, FILM COATED ORAL at 07:09

## 2018-04-13 RX ADMIN — ONDANSETRON 4 MILLIGRAM(S): 8 TABLET, FILM COATED ORAL at 16:12

## 2018-04-13 RX ADMIN — Medication 1 DROP(S): at 07:04

## 2018-04-13 RX ADMIN — ONDANSETRON 4 MILLIGRAM(S): 8 TABLET, FILM COATED ORAL at 00:51

## 2018-04-13 RX ADMIN — TRAMADOL HYDROCHLORIDE 25 MILLIGRAM(S): 50 TABLET ORAL at 10:21

## 2018-04-13 RX ADMIN — Medication 1 DROP(S): at 17:13

## 2018-04-13 RX ADMIN — Medication 400 MILLIGRAM(S): at 07:09

## 2018-04-13 RX ADMIN — Medication 0.5 MILLIGRAM(S): at 22:35

## 2018-04-13 RX ADMIN — Medication 400 MILLIGRAM(S): at 08:54

## 2018-04-13 RX ADMIN — ONDANSETRON 4 MILLIGRAM(S): 8 TABLET, FILM COATED ORAL at 12:07

## 2018-04-13 RX ADMIN — Medication 0.5 MILLIGRAM(S): at 12:42

## 2018-04-13 RX ADMIN — Medication 1 DROP(S): at 01:21

## 2018-04-13 RX ADMIN — Medication 1000 MILLIGRAM(S): at 13:42

## 2018-04-13 RX ADMIN — LIDOCAINE 1 PATCH: 4 CREAM TOPICAL at 11:23

## 2018-04-13 RX ADMIN — TRAMADOL HYDROCHLORIDE 25 MILLIGRAM(S): 50 TABLET ORAL at 11:23

## 2018-04-13 RX ADMIN — Medication 1 DROP(S): at 11:23

## 2018-04-13 RX ADMIN — SODIUM CHLORIDE 100 MILLILITER(S): 9 INJECTION, SOLUTION INTRAVENOUS at 11:24

## 2018-04-13 RX ADMIN — Medication 0.5 MILLIGRAM(S): at 16:12

## 2018-04-13 NOTE — PROVIDER CONTACT NOTE (OTHER) - SITUATION
during routine rounding patient is tachypneic and complaining of chest pain  9,  short of breath and nausea

## 2018-04-13 NOTE — SWALLOW BEDSIDE ASSESSMENT ADULT - COMMENTS
Attempted to see pt for initial assessment of swallow function this am at which time pt's uncle was present at bedside. Rationale for bedside swallow evaluation was discussed with pt and uncle at which time pt and uncle refuse. Pt states he is "nauseous" and that he is experiencing "chest burning" and "face pain". Offered to return for swallow evaluation at later time as schedule permitted, which pt and uncle further refused. Discussed pt status with RN and medicine team 2 at [facility] [id number]. Medical team states they will be consulting with pt and family and will contact this dept with further direction on reattempting clinical swallow evaluation. Attempted to see pt for initial assessment of swallow function this am at which time pt's uncle was present at bedside. Rationale for bedside swallow evaluation was discussed with pt and uncle at which time pt and uncle refuse. Pt states he is "nauseous" and that he is experiencing "chest burning" and "face pain". Offered to return for swallow evaluation at later time as schedule permitted, which pt and uncle further refused. Discussed pt status with RN and medicine team 2 at [facility] [id number]. Medical team states they will be consulting with pt and family and will contact this dept with further direction on if/when reattempt at clinical swallow evaluation is warranted.

## 2018-04-13 NOTE — PROGRESS NOTE ADULT - PROBLEM SELECTOR PLAN 3
- reported dysphagia to solids and liquids (except for water)  - patient had 2 episodes of vomitus today  - oropharynx exam WNL  - patient refusing speech and swallow eval  - c/w IVF for now and retry S/S tomorrow  - c/w zofran PRN for nausea and protonix

## 2018-04-13 NOTE — PROGRESS NOTE BEHAVIORAL HEALTH - NSBHCHARTREVIEWINVESTIGATE_PSY_A_CORE FT
12 Lead ECG in chart, not yet uploaded.
< from: 12 Lead ECG (04.07.18 @ 23:50) >    Ventricular Rate 63 BPM    Atrial Rate 63 BPM    P-R Interval 136 ms    QRS Duration 86 ms    Q-T Interval 384 ms    QTC Calculation(Bezet) 392 ms    P Axis 19 degrees    R Axis 43 degrees    T Axis 38 degrees    Diagnosis Line Normal sinus rhythm  Normal ECG
< from: 12 Lead ECG (04.07.18 @ 23:50) >  Ventricular Rate 63 BPM    Atrial Rate 63 BPM    P-R Interval 136 ms    QRS Duration 86 ms    Q-T Interval 384 ms    QTC Calculation(Bezet) 392 ms    P Axis 19 degrees    R Axis 43 degrees    T Axis 38 degrees    Diagnosis Line Normal sinus rhythm  Normal ECG  < end of copied text >

## 2018-04-13 NOTE — PROGRESS NOTE ADULT - SUBJECTIVE AND OBJECTIVE BOX
Dixie Christy PGY 1  Pager: [phone]/ [phone]  Patient is a 22y old  Male who presents with a chief complaint of Facial droop, Seizure-like activity (08 Apr 2018 05:26)      SUBJECTIVE / OVERNIGHT EVENTS:    MEDICATIONS  (STANDING):  artificial  tears Solution 1 Drop(s) Both EYES two times a day  artificial tears (preservative free) Ophthalmic Solution 1 Drop(s) Right EYE four times a day  lidocaine   Patch 1 Patch Transdermal daily  lidocaine 1% (Preservative-free) Injectable 10 milliLiter(s) Local Injection once  pantoprazole    Tablet 40 milliGRAM(s) Oral before breakfast    MEDICATIONS  (PRN):  acetaminophen   Tablet. 650 milliGRAM(s) Oral every 6 hours PRN Mild Pain (1 - 3)  ibuprofen  Tablet 400 milliGRAM(s) Oral every 8 hours PRN moderate to severe pain  ondansetron   Disintegrating Tablet 4 milliGRAM(s) Oral three times a day PRN Nausea and/or Vomiting  traMADol 25 milliGRAM(s) Oral every 6 hours PRN Moderate Pain (4 - 6)      OBJECTIVE:    Vital Signs Last 24 Hrs  T(C): 36.9 (13 Apr 2018 06:14), Max: 36.9 (13 Apr 2018 06:14)  T(F): 98.4 (13 Apr 2018 06:14), Max: 98.4 (13 Apr 2018 06:14)  HR: 82 (13 Apr 2018 06:14) (53 - 84)  BP: 117/68 (13 Apr 2018 06:14) (100/60 - 118/79)  BP(mean): --  RR: 19 (13 Apr 2018 06:14) (18 - 22)  SpO2: 100% (13 Apr 2018 06:14) (99% - 100%)    CAPILLARY BLOOD GLUCOSE        I&O's Summary      PHYSICAL EXAM:  GENERAL: NAD, well-developed  HEAD:  Atraumatic, Normocephalic  EYES: EOMI, PERRLA, conjunctiva and sclera clear  NECK: Supple, No JVD  CHEST/LUNG: Clear to auscultation bilaterally; No wheeze  HEART: Regular rate and rhythm; No murmurs, rubs, or gallops  ABDOMEN: Soft, Nontender, Nondistended; Bowel sounds present  EXTREMITIES:  2+ Peripheral Pulses, No clubbing, cyanosis, or edema  PSYCH: AAOx3  NEUROLOGY: non-focal  SKIN: No rashes or lesions    LABS:        PT/INR - ( 11 Apr 2018 15:20 )   PT: 12.3 SEC;   INR: 1.07          PTT - ( 11 Apr 2018 15:20 )  PTT:32.4 SEC            RESPIRATORY  VENT:    ABG:     VBG:     RADIOLOGY & ADDITIONAL TESTS:  (Imaging Personally Reviewed)    Consultant(s) Notes Reviewed:      Care Discussed with Consultants/Other Providers: Dixie Christy PGY 1  Pager: [phone]/ [phone]  Patient is a 22y old  Male who presents with a chief complaint of Facial droop, Seizure-like activity (08 Apr 2018 05:26)      SUBJECTIVE / OVERNIGHT EVENTS:  Patient seen and examined at bedside    MEDICATIONS  (STANDING):  artificial  tears Solution 1 Drop(s) Both EYES two times a day  artificial tears (preservative free) Ophthalmic Solution 1 Drop(s) Right EYE four times a day  lidocaine   Patch 1 Patch Transdermal daily  lidocaine 1% (Preservative-free) Injectable 10 milliLiter(s) Local Injection once  pantoprazole    Tablet 40 milliGRAM(s) Oral before breakfast    MEDICATIONS  (PRN):  acetaminophen   Tablet. 650 milliGRAM(s) Oral every 6 hours PRN Mild Pain (1 - 3)  ibuprofen  Tablet 400 milliGRAM(s) Oral every 8 hours PRN moderate to severe pain  ondansetron   Disintegrating Tablet 4 milliGRAM(s) Oral three times a day PRN Nausea and/or Vomiting  traMADol 25 milliGRAM(s) Oral every 6 hours PRN Moderate Pain (4 - 6)      OBJECTIVE:    Vital Signs Last 24 Hrs  T(C): 36.9 (13 Apr 2018 06:14), Max: 36.9 (13 Apr 2018 06:14)  T(F): 98.4 (13 Apr 2018 06:14), Max: 98.4 (13 Apr 2018 06:14)  HR: 82 (13 Apr 2018 06:14) (53 - 84)  BP: 117/68 (13 Apr 2018 06:14) (100/60 - 118/79)  BP(mean): --  RR: 19 (13 Apr 2018 06:14) (18 - 22)  SpO2: 100% (13 Apr 2018 06:14) (99% - 100%)    CAPILLARY BLOOD GLUCOSE        I&O's Summary      PHYSICAL EXAM:  GENERAL: NAD, well-developed  HEAD:  Atraumatic, Normocephalic  EYES: EOMI, PERRLA, conjunctiva and sclera clear  NECK: Supple, No JVD  CHEST/LUNG: Clear to auscultation bilaterally; No wheeze  HEART: Regular rate and rhythm; No murmurs, rubs, or gallops  ABDOMEN: Soft, Nontender, Nondistended; Bowel sounds present  EXTREMITIES:  2+ Peripheral Pulses, No clubbing, cyanosis, or edema  PSYCH: AAOx3  NEUROLOGY: non-focal  SKIN: No rashes or lesions    LABS:        PT/INR - ( 11 Apr 2018 15:20 )   PT: 12.3 SEC;   INR: 1.07          PTT - ( 11 Apr 2018 15:20 )  PTT:32.4 SEC            RESPIRATORY  VENT:    ABG:     VBG:     RADIOLOGY & ADDITIONAL TESTS:  (Imaging Personally Reviewed)    Consultant(s) Notes Reviewed:      Care Discussed with Consultants/Other Providers: Dixie Christy PGY 1  Pager: [phone]/ [phone]  Patient is a 22y old  Male who presents with a chief complaint of Facial droop, Seizure-like activity (08 Apr 2018 05:26)      SUBJECTIVE / OVERNIGHT EVENTS:  Patient seen and examined at bedside. Overnight, patient complaining of chest pain, EKG did not show signs of ischemic changes. Patient was given pain medication.  This morning, patient complaining of shortness of breath. Vitals stable and lungs clear. Patient wanted to continue on O2 NC because it made him feel better.  This afternoon, patient complaining of marked chest pain and shortness of breath. Patient examined at bedside with HR 90, BP systolic 113, saturation 100% and afebrile. EKG shows no ischemic changes. CXR and stat labs were ordered. After 10 minutes, patients SOB improved. Physical exam showed S1/S2, no murmurs, lungs clear to ausculation. Patient had episode of vomitus while at bedside.     MEDICATIONS  (STANDING):  artificial  tears Solution 1 Drop(s) Both EYES two times a day  artificial tears (preservative free) Ophthalmic Solution 1 Drop(s) Right EYE four times a day  lidocaine   Patch 1 Patch Transdermal daily  lidocaine 1% (Preservative-free) Injectable 10 milliLiter(s) Local Injection once  pantoprazole    Tablet 40 milliGRAM(s) Oral before breakfast    MEDICATIONS  (PRN):  acetaminophen   Tablet. 650 milliGRAM(s) Oral every 6 hours PRN Mild Pain (1 - 3)  ibuprofen  Tablet 400 milliGRAM(s) Oral every 8 hours PRN moderate to severe pain  ondansetron   Disintegrating Tablet 4 milliGRAM(s) Oral three times a day PRN Nausea and/or Vomiting  traMADol 25 milliGRAM(s) Oral every 6 hours PRN Moderate Pain (4 - 6)      OBJECTIVE:    Vital Signs Last 24 Hrs  T(C): 36.9 (13 Apr 2018 06:14), Max: 36.9 (13 Apr 2018 06:14)  T(F): 98.4 (13 Apr 2018 06:14), Max: 98.4 (13 Apr 2018 06:14)  HR: 82 (13 Apr 2018 06:14) (53 - 84)  BP: 117/68 (13 Apr 2018 06:14) (100/60 - 118/79)  BP(mean): --  RR: 19 (13 Apr 2018 06:14) (18 - 22)  SpO2: 100% (13 Apr 2018 06:14) (99% - 100%)    CAPILLARY BLOOD GLUCOSE        I&O's Summary      PHYSICAL EXAM:  GENERAL: NAD, well-developed  HEAD:  Atraumatic, Normocephalic  NECK: Supple, No JVD  CHEST/LUNG: Clear to auscultation bilaterally; No wheeze  HEART: Regular rate and rhythm; No murmurs, rubs, or gallops  ABDOMEN: Soft, Nontender, Nondistended; Bowel sounds present  EXTREMITIES:  2+ Peripheral Pulses, No clubbing, cyanosis, or edema  PSYCH: flat affect, not answering questions   NEUROLOGY: facial droop markedly improved when patient evaluated for shortness of breath  SKIN: No rashes or lesions

## 2018-04-13 NOTE — PROGRESS NOTE BEHAVIORAL HEALTH - NSBHFUPINTERVALHXFT_PSY_A_CORE
Overnight had an episode of severe chest pain, had stat ECG and IV ketorolac, no psychiatric PRNs given.  Pt seen at bedside, awake, A&Ox4.  Pt was trying to sleep and not thinking about anything in particular before onset of chest pain.  Pain progressed over 30min, localized to epigastrium up to throat, rated a 9/10 and resolved for a while with the pain meds, but has been intermittent since last night.   During evaluation, pt became suddenly nauseous and vomited on the floor.  Pt has thrown up a few times in the last couple days, states eating makes him very nauseous.  Soon after throwing up, pt said he was short of breathe and asked to rest.      Prior to onset of symptoms last week, pt says his life "was great" and he was busy working and travelling around the [place], including [place] in January 2018 and [place] in November 2017.  Pt does not "like this hospital life."  Per medicine team, pt said he want to get back to his life and does not want to keep living like this, with these symptoms. Overnight had an episode of severe chest pain, had stat ECG and IV ketorolac, no psychiatric PRNs given.  Pt seen at bedside, awake, A&Ox4.  Pt states he was trying to sleep and not thinking about anything in particular before onset of chest pain.  Pain progressed over 30min, localized to epigastrium up to throat, rated a 9/10 and resolved for a while with the pain meds, but has been intermittent since last night.   During evaluation, pt became suddenly nauseous and vomited on the floor.  Pt has thrown up a few times in the last couple days, states eating makes him very nauseous.  Soon after throwing up, pt said he was short of breathe and asked to rest.      Prior to onset of symptoms last week, pt says his life "was great" and he was busy working and travelling around the [place], including [place] in January 2018 and [place] in November 2017.  Pt does not "like this hospital life."  Per medicine team, pt said he want to get back to his life and does not want to keep living like this, with these symptoms.

## 2018-04-13 NOTE — PROGRESS NOTE ADULT - PROBLEM SELECTOR PLAN 1
- presents with  4-day history of sudden-onset L facial droop a/w R-facial numbness, inability to close R-eye, and severe headache- thought to be 2/2 to bells palsy per outside hospital evaluation  - waxing and waning facial droop, patient able to move mouth to right side  - MRI of head negative and neurological exam does not correlate with physiologic pathology  - s/p course of steroids and valtrex for bells palsy w/o improvement  - ddx includes conversion disorder vs autoimmune process vs infection  - s/p LP on 4/11- cell count normal, gram stain negative, culture neg, PCR neg

## 2018-04-13 NOTE — PROGRESS NOTE ADULT - PROBLEM SELECTOR PLAN 4
- Patient reported to have 3 episodes of whole-body shaking - no episodes in the hospital  - unknown if seizure- no elevated CK on admission  - will obtain awake/asleep EEG per psych  - possible pseudoseizure

## 2018-04-13 NOTE — PROGRESS NOTE ADULT - ASSESSMENT
22M with no PMHx who presents to the ED with L-sided facial droop possibly 2/2 Bell's Palsy vs conversion disorder vs auotimmune neuro disorder?, also with recent seizure-like activity concerning for epilepsy vs psychogenic non epileptic seizure, s/p 7 day tx for [place] Palsy, with normal MRI now s/p LP yesterday 4/11, with chest pain and SOB today.

## 2018-04-13 NOTE — CHART NOTE - NSCHARTNOTEFT_GEN_A_CORE
Notified by RN that pt experiencing severe chest pain, which he described as burning but which he also endorsed as feeling like pressure.   Came to bedside, where patient described pain as being 9/10 severity, burning in quality, and substernal in location, worse w deep breaths. Pt also complaining of nausea. Vitals obtained shortly before this author's arrival included HR 67, SBP >100, SpO2 100% on room air. On exam, pt taking fast shallow breaths on nasal cannula (which RN had placed due to pt's tachypnea), w erratic eye movements, L-sided lip twitching, lung auscultation with shallow breaths without wheezes, heart auscultation RRR, abdomen soft and non-tender. EKG demonstrated HR 67 w sinus arrhythmia (greater inter-beat variability than on previous EKGs), w axis and intervals wnl, q-wave ~1mm in III (similar to previous two EKGs this admission), and rSr' in V1 (also similar to previous two EKGs this admission), without evidence of acute ischemic changes. Pt reported that he did not currently feel able to tolerate PO, so he was given ondansetron 4mg oral disintegrating tablet and, to obtain speedy pain control in the interim, was ordered for ketorolac 15mg IV x1 and acetaminophen 1g IV x1.   This author explained to patient that pain was unlikely to be due to heart problem and that we would try to control pain w meds. Offered heat or cold pack to supplement meds; pt declined. Attempted to reassure pt that we thought his prognosis was good; he expressed concern about ongoing nausea and said that he had vomited after just two spoonfuls of food on 4/12 PM.  Encouraged RN to contact me with any additional issues.     Merlin Duong MD, PGY-1  Internal Medicine House Staff  Night Coverage for Teams 1-3  Pager [phone]

## 2018-04-13 NOTE — PROGRESS NOTE ADULT - PROBLEM SELECTOR PLAN 2
- patient with shortness of breath and chest pain overnight and during day  - no indication of pathology- chest examination clear, EKG normal sinus, and O2 saturations 100%  - will follow up chest xray  - shortness of breath may be 2/2 to anxiety- will give ativan per psych recommendations - patient with shortness of breath and chest pain overnight and during day  - no indication of pathology- chest examination clear, EKG normal sinus, and O2 saturations 100%  - will follow up chest xray  - shortness of breath may be 2/2 to anxiety- will start ativan per psych recommendations

## 2018-04-13 NOTE — PROGRESS NOTE ADULT - PROBLEM SELECTOR PLAN 6
- DVT PPx:  IMPROVE score= 1, no pharm  dvt ppx  - regular diet  - PT eval - DVT PPx:  IMPROVE score= 1, no pharm  dvt ppx  - regular diet  - PT 4/12 dc home w/ outpatient therapy

## 2018-04-13 NOTE — PROGRESS NOTE BEHAVIORAL HEALTH - SUMMARY
Patient is a 23 y/o male from [facility], immigrated to [place] 3.5 y/a, employed as a [employer] , single, childless, domiciled with aunt, sister and her , with no significant PMHx or PPHx, no history of psychiatric hospitalizations, and no history of suicidality, admitted with seizure-like activity. Psychiatry consulted due to concerns regarding conversion disorder.    This morning, pt endorses new onset chest pain, N/V, and continues to complain of fatigue, inability to close R eyelid, pain around right eye, L facial droop, difficulty opening mouth, and generalized weakness.  Pt also complained of difficulty breathing after talking for more than 5 minutes.  Pt mood and affect appeared depressed and constricted.  Pt continues to lack any mood or psychotic symptoms, is future oriented and hopeful and does not meet criteria for in-pt psychiatric hospitalization.     Plan:  1.  Please begin Ativan 0.5mg PO BID  2.  Begin Ativan 0.5mg PO q6h PRN for anxiety  3.  Please obtain awake/asleep EEG Patient is a 21 y/o male from [facility], immigrated to [place] 3.5 y/a, employed as a [employer] , single, childless, domiciled with aunt, sister and her , with no significant PMHx or PPHx, no history of psychiatric hospitalizations, and no history of suicidality, admitted with seizure-like activity. Psychiatry consulted due to concerns regarding conversion disorder.    This morning, pt endorses new onset chest pain, N/V, and continues to complain of fatigue, inability to close R eyelid, pain around right eye, L facial droop, difficulty opening mouth, and generalized weakness.  Pt also complained of difficulty breathing after talking for more than 5 minutes.  Pt mood and affect appeared depressed and constricted.  Pt continues to lack any mood or psychotic symptoms, is future oriented and hopeful and does not meet criteria for in-pt psychiatric hospitalization.     Plan:  1.  Please begin Ativan 0.5mg PO BID  2.  Use Ativan 0.5mg PO q6h PRN for anxiety  3.  Please obtain awake/asleep EEG

## 2018-04-13 NOTE — SWALLOW BEDSIDE ASSESSMENT ADULT - COMMENTS
Patient is a 22 year old male with no significant PMHx who presents to the ED with L-sided facial droop possibly 2/2 Bell's Palsy vs conversion disorder, also with recent seizure-like activity concerning for epilepsy vs pseudoseizure.    The patient was received awake and alert though not responding to clinician questioning or simple directives. Results of this evaluation were discussed with RN and Team 2.

## 2018-04-14 ENCOUNTER — TRANSCRIPTION ENCOUNTER (OUTPATIENT)
Age: 23
End: 2018-04-14

## 2018-04-14 PROCEDURE — 99233 SBSQ HOSP IP/OBS HIGH 50: CPT | Mod: GC

## 2018-04-14 RX ADMIN — Medication 1 DROP(S): at 17:39

## 2018-04-14 RX ADMIN — Medication 1 DROP(S): at 05:35

## 2018-04-14 RX ADMIN — Medication 400 MILLIGRAM(S): at 11:30

## 2018-04-14 RX ADMIN — LIDOCAINE 1 PATCH: 4 CREAM TOPICAL at 23:10

## 2018-04-14 RX ADMIN — Medication 1 DROP(S): at 05:23

## 2018-04-14 RX ADMIN — Medication 0.5 MILLIGRAM(S): at 17:40

## 2018-04-14 RX ADMIN — Medication 1 DROP(S): at 23:16

## 2018-04-14 RX ADMIN — Medication 400 MILLIGRAM(S): at 10:29

## 2018-04-14 RX ADMIN — Medication 400 MILLIGRAM(S): at 19:15

## 2018-04-14 RX ADMIN — TRAMADOL HYDROCHLORIDE 25 MILLIGRAM(S): 50 TABLET ORAL at 21:57

## 2018-04-14 RX ADMIN — PANTOPRAZOLE SODIUM 40 MILLIGRAM(S): 20 TABLET, DELAYED RELEASE ORAL at 05:38

## 2018-04-14 RX ADMIN — TRAMADOL HYDROCHLORIDE 25 MILLIGRAM(S): 50 TABLET ORAL at 22:54

## 2018-04-14 RX ADMIN — Medication 1 DROP(S): at 00:21

## 2018-04-14 RX ADMIN — LIDOCAINE 1 PATCH: 4 CREAM TOPICAL at 12:40

## 2018-04-14 RX ADMIN — Medication 400 MILLIGRAM(S): at 20:16

## 2018-04-14 RX ADMIN — Medication 1 DROP(S): at 12:40

## 2018-04-14 RX ADMIN — Medication 0.5 MILLIGRAM(S): at 05:37

## 2018-04-14 NOTE — PROGRESS NOTE ADULT - PROBLEM SELECTOR PLAN 1
- presents with  4-day history of sudden-onset L facial droop a/w R-facial numbness, inability to close R-eye, and severe headache- thought to be 2/2 to bells palsy per outside hospital evaluation  - waxing and waning facial droop, patient able to move mouth to right side; droop disappearing when patient speaking   - MRI of head negative and neurological exam does not correlate with physiologic pathology  - s/p course of steroids and valtrex for bells palsy w/o improvement  - ddx includes conversion disorder vs autoimmune process vs infection  - s/p LP on 4/11- cell count normal, gram stain negative, culture neg, PCR neg

## 2018-04-14 NOTE — PROGRESS NOTE ADULT - ASSESSMENT
22M with no PMHx who presents to the ED with L-sided facial droop possibly 2/2 Bell's Palsy vs conversion disorder vs auotimmune neuro disorder?, also with recent seizure-like activity concerning for epilepsy vs psychogenic non epileptic seizure, s/p 7 day tx for [place] Palsy, with normal MRI now s/p LP yesterday 4/11, with chest pain and SOB.

## 2018-04-14 NOTE — DISCHARGE NOTE ADULT - CARE PROVIDER_API CALL
Thao Moody), Psychiatry; Psychosomatic Medicine  [address] [place] [address]  Phone: [phone]  Fax: [phone] Thao Moody), Psychiatry; Psychosomatic Medicine  [address] [place] [address]  Phone: [phone]  Fax: [phone]    Dr. Lacie Ryder,   [address], [place] [address]  Phone: [phone]  Fax: (   )    -

## 2018-04-14 NOTE — DISCHARGE NOTE ADULT - PROVIDER TOKENS
LEENA:'[id number]' TOKEN:'[id number]',FREE:[LAST:[Dr. Lacie Ryder],PHONE:[phone]],FAX:[(   )    -],ADDRESS:[[address], [place] [address]]]

## 2018-04-14 NOTE — DISCHARGE NOTE ADULT - HOSPITAL COURSE
22M significant PMHx who presents to the ED with L-sided facial droop and recent seizure-like activity. Patient is accompanied by his uncle at bedside who helped provide history. Patient reports that he was in his usual state of until 4-days ago when he developed sudden-onset L-sided facial droop as well as inability to close his R eye. He reports that he was unable to eat/drink due to his droop which concerned him and so he went to [facility] the following day for further evaluation. Patient was treated with Prednisone and Valtrex for presumed Bell's Palsy and he was discharged home. After discharge, patient remained concerned about his symptoms, and he ultimately went to [facility] 2 separate times for further evaluation. He was presumed to have Bell's Palsy at [facility] as well and was given the same treatment. Patient reports that over the past 2 days, he has been having a severe, diffuse headache as well has R neck pain. He reports numbness on the R side of his face as well. In addition, per patient's uncle, patient has had 3 separate episodes of seizure-like activity over the past 2 days. Patient's whole body reportedly shook for 20-25 seconds each time which was followed a brief episode of unresponsiveness. No reported bowel/urinary incontinence or tongue biting during these episodes. Since patient's symptoms appeared to be worsening, he came to [facility] for further evaluation. Patient states that he had a fever and cough ~ 2 months ago. He explains that he was given 2 antibiotics by his PMD at that time, but he is unsure what those antibiotics were. No further episodes of fever. On ROS, patient endorses poor PO intake, occasional nausea, and intermittent "burning chest pain," but denies any shortness of breath, vomiting, diarrhea, dysuria, or rash. No recent sick contacts.    Patient admitted for evaluation of facial droop and weakness. 22M significant PMHx who presents to the ED with L-sided facial droop and recent seizure-like activity. Patient is accompanied by his uncle at bedside who helped provide history. Patient reports that he was in his usual state of until 4-days ago when he developed sudden-onset L-sided facial droop as well as inability to close his R eye. He reports that he was unable to eat/drink due to his droop which concerned him and so he went to [facility] the following day for further evaluation. Patient was treated with Prednisone and Valtrex for presumed Bell's Palsy and he was discharged home. After discharge, patient remained concerned about his symptoms, and he ultimately went to [facility] 2 separate times for further evaluation. He was presumed to have Bell's Palsy at [facility] as well and was given the same treatment. Patient reports that over the past 2 days, he has been having a severe, diffuse headache as well has R neck pain. He reports numbness on the R side of his face as well. In addition, per patient's uncle, patient has had 3 separate episodes of seizure-like activity over the past 2 days. Patient's whole body reportedly shook for 20-25 seconds each time which was followed a brief episode of unresponsiveness. No reported bowel/urinary incontinence or tongue biting during these episodes. Since patient's symptoms appeared to be worsening, he came to [facility] for further evaluation. Patient states that he had a fever and cough ~ 2 months ago. He explains that he was given 2 antibiotics by his PMD at that time, but he is unsure what those antibiotics were. No further episodes of fever. On ROS, patient endorses poor PO intake, occasional nausea, and intermittent "burning chest pain," but denies any shortness of breath, vomiting, diarrhea, dysuria, or rash. No recent sick contacts.    Patient admitted for evaluation of facial droop and weakness. Patient underwent an MRI without any acute findings. He was continued on his prednisone and valtrex for possible bells, without improvement in his symptoms. His facial droop waxed and waned throughout conversation and evaluation each morning. Patient was able to speak in soft voice without much difficulty, but when prompted to open mouth for evaluation, was unable to do so. Differential for facial droop and weakness included bells palsy vs conversion disorder, however per neurology, and LP was done to rule out other pathology. LP shows no evidence of infection, and other workup is pending. Throughout hospitalization, patient continued to complain pain over his right neck, relieved with massage, inability to close his left eye, chest pain, shortness of breath and dysphagia. He had multiple EKGs done which showed no evidence of ischemia, all laboratory work was within normal limits and CXR were clear. Patient was hemodynamically stable with each episode of chest pain. Furthermore, regarding his neck pain, patient was given lidoderm patch and multiple regimens of pain medications, and his pain waxed and waned. In regards to this dysphagia, patient reports he could not swallow anything. He states that water was okay, but other food with same consistency of water could not be swallowed. He repeatedly refused to undergo speech and swallow evaluation.     Patient had flat affect during encounters, and given vague symptoms and neurological symptoms that did not correlate physiologically psychiatry was called to evaluate. 22M significant PMHx who presents to the ED with L-sided facial droop and recent seizure-like activity. Patient is accompanied by his uncle at bedside who helped provide history. Patient reports that he was in his usual state of until 4-days ago when he developed sudden-onset L-sided facial droop as well as inability to close his R eye. He reports that he was unable to eat/drink due to his droop which concerned him and so he went to [facility] the following day for further evaluation. Patient was treated with Prednisone and Valtrex for presumed Bell's Palsy and he was discharged home. After discharge, patient remained concerned about his symptoms, and he ultimately went to [facility] 2 separate times for further evaluation. He was presumed to have Bell's Palsy at [facility] as well and was given the same treatment. Patient reports that over the past 2 days, he has been having a severe, diffuse headache as well has R neck pain. He reports numbness on the R side of his face as well. In addition, per patient's uncle, patient has had 3 separate episodes of seizure-like activity over the past 2 days. Patient's whole body reportedly shook for 20-25 seconds each time which was followed a brief episode of unresponsiveness. No reported bowel/urinary incontinence or tongue biting during these episodes. Since patient's symptoms appeared to be worsening, he came to [facility] for further evaluation. Patient states that he had a fever and cough ~ 2 months ago. He explains that he was given 2 antibiotics by his PMD at that time, but he is unsure what those antibiotics were. No further episodes of fever. On ROS, patient endorses poor PO intake, occasional nausea, and intermittent "burning chest pain," but denies any shortness of breath, vomiting, diarrhea, dysuria, or rash. No recent sick contacts.    Patient admitted for evaluation of facial droop and weakness. Patient underwent an MRI without any acute findings. He was continued on his prednisone and valtrex for possible bells, without improvement in his symptoms. His facial droop waxed and waned throughout conversation and evaluation each morning. Patient was able to speak in soft voice without much difficulty, but when prompted to open mouth for evaluation, was unable to do so. Differential for facial droop and weakness included bells palsy vs conversion disorder, however per neurology, and LP was done to rule out other pathology. LP shows no evidence of infection, and other workup is pending. Throughout hospitalization, patient continued to complain pain over his right neck, relieved with massage, inability to close his left eye, chest pain, shortness of breath and dysphagia. He had multiple EKGs done which showed no evidence of ischemia, all laboratory work was within normal limits and CXR were clear. Patient was hemodynamically stable with each episode of chest pain. Furthermore, regarding his neck pain, patient was given lidoderm patch and multiple regimens of pain medications, and his pain waxed and waned. In regards to this dysphagia, patient reports he could not swallow anything. He states that water was okay, but other food with same consistency of water could not be swallowed. He repeatedly refused to undergo speech and swallow evaluation.     Patient had flat affect during encounters, and given vague symptoms and neurological symptoms that did not correlate physiologically, psychiatry was called to evaluate.  Pt was started on seroquel and ativan PRN for agitation. Pt unable to tolerate PO but seen to able to have no difficulty with swallowing. Pt. now has concern for right jaw pain without clear etiology or source. Pt. passed S&S but had episodes of emesis. Pt. was evalauted by psych patient now denies SI/ HI or plan for suicide attempt. Pt has capacity to leave against medical advice. 22M significant PMHx who presents to the ED with L-sided facial droop and recent seizure-like activity. Patient is accompanied by his uncle at bedside who helped provide history. Patient reports that he was in his usual state of until 4-days ago when he developed sudden-onset L-sided facial droop as well as inability to close his R eye. He reports that he was unable to eat/drink due to his droop which concerned him and so he went to [facility] the following day for further evaluation. Patient was treated with Prednisone and Valtrex for presumed Bell's Palsy and he was discharged home. After discharge, patient remained concerned about his symptoms, and he ultimately went to [facility] 2 separate times for further evaluation. He was presumed to have Bell's Palsy at [facility] as well and was given the same treatment. Patient reports that over the past 2 days, he has been having a severe, diffuse headache as well has R neck pain. He reports numbness on the R side of his face as well. In addition, per patient's uncle, patient has had 3 separate episodes of seizure-like activity over the past 2 days. Patient's whole body reportedly shook for 20-25 seconds each time which was followed a brief episode of unresponsiveness. No reported bowel/urinary incontinence or tongue biting during these episodes. Since patient's symptoms appeared to be worsening, he came to [facility] for further evaluation. Patient states that he had a fever and cough ~ 2 months ago. He explains that he was given 2 antibiotics by his PMD at that time, but he is unsure what those antibiotics were. No further episodes of fever. On ROS, patient endorses poor PO intake, occasional nausea, and intermittent "burning chest pain," but denies any shortness of breath, vomiting, diarrhea, dysuria, or rash. No recent sick contacts.    Patient admitted for evaluation of facial droop and weakness. Patient underwent an MRI without any acute findings. He was continued on his prednisone and valtrex for possible bells, without improvement in his symptoms. His facial droop waxed and waned throughout conversation and evaluation each morning. Patient was able to speak in soft voice without much difficulty, but when prompted to open mouth for evaluation, was unable to do so. Differential for facial droop and weakness included bells palsy vs conversion disorder, however per neurology, and LP was done to rule out other pathology. LP shows no evidence of infection, and other workup is pending. Throughout hospitalization, patient continued to complain pain over his right neck, relieved with massage, inability to close his left eye, chest pain, shortness of breath and dysphagia. He had multiple EKGs done which showed no evidence of ischemia, all laboratory work was within normal limits and CXR were clear. Patient was hemodynamically stable with each episode of chest pain. Furthermore, regarding his neck pain, patient was given lidoderm patch and multiple regimens of pain medications, and his pain waxed and waned. In regards to this dysphagia, patient reports he could not swallow anything. He states that water was okay, but other food with same consistency of water could not be swallowed. He repeatedly refused to undergo speech and swallow evaluation. Pt. EEG was without any overt epileptic activity.    Patient had flat affect during encounters, and given vague symptoms and neurological symptoms that did not correlate physiologically, psychiatry was called to evaluate.  Pt was started on seroquel and ativan PRN for agitation. Pt unable to tolerate PO but seen to able to have no difficulty with swallowing. Pt. now has concern for right jaw pain without clear etiology or source. Pt. passed S&S but had episodes of emesis. Pt has esophagram performed which showed 13 mm barium tablet stuck at the level of the midesophagus on initial and 20 minute delayed images of uncertain etiology. Mildly delayed passage of contrast from the stomach into the duodenum. Pt underwent EGD with GI which showed __________________. Pt. was evalauted by psych patient now denies SI/ HI or plan for suicide attempt. Pt has capacity to leave against medical advice. Pt with various symptomatolgy and no clear organic cause is likely suffering from conversion disorder for which close outpatient follow up and psychotherapy is recommended. Patient and family at bedside was explained all of the negative finding and that this is conversion disorder. Pt is stable for medical discharge and close outpatient follow up. Pt is able to eat and drink and can have Tylenol and ibuprofen for pain. 22M significant PMHx who presents to the ED with L-sided facial droop and recent seizure-like activity. Patient is accompanied by his uncle at bedside who helped provide history. Patient reports that he was in his usual state of until 4-days ago when he developed sudden-onset L-sided facial droop as well as inability to close his R eye. He reports that he was unable to eat/drink due to his droop which concerned him and so he went to [facility] the following day for further evaluation. Patient was treated with Prednisone and Valtrex for presumed Bell's Palsy and he was discharged home. After discharge, patient remained concerned about his symptoms, and he ultimately went to [facility] 2 separate times for further evaluation. He was presumed to have Bell's Palsy at [facility] as well and was given the same treatment. Patient reports that over the past 2 days, he has been having a severe, diffuse headache as well has R neck pain. He reports numbness on the R side of his face as well. In addition, per patient's uncle, patient has had 3 separate episodes of seizure-like activity over the past 2 days. Patient's whole body reportedly shook for 20-25 seconds each time which was followed a brief episode of unresponsiveness. No reported bowel/urinary incontinence or tongue biting during these episodes. Since patient's symptoms appeared to be worsening, he came to [facility] for further evaluation. Patient states that he had a fever and cough ~ 2 months ago. He explains that he was given 2 antibiotics by his PMD at that time, but he is unsure what those antibiotics were. No further episodes of fever. On ROS, patient endorses poor PO intake, occasional nausea, and intermittent "burning chest pain," but denies any shortness of breath, vomiting, diarrhea, dysuria, or rash. No recent sick contacts.    Patient admitted for evaluation of facial droop and weakness. Patient underwent an MRI without any acute findings. He was continued on his prednisone and valtrex for possible bells, without improvement in his symptoms. His facial droop waxed and waned throughout conversation and evaluation each morning. Patient was able to speak in soft voice without much difficulty, but when prompted to open mouth for evaluation, was unable to do so. Differential for facial droop and weakness included bells palsy vs conversion disorder, however per neurology, and LP was done to rule out other pathology. LP shows no evidence of infection, and other workup is pending. Throughout hospitalization, patient continued to complain pain over his right neck, relieved with massage, inability to close his left eye, chest pain, shortness of breath and dysphagia. He had multiple EKGs done which showed no evidence of ischemia, all laboratory work was within normal limits and CXR were clear. Patient was hemodynamically stable with each episode of chest pain. Furthermore, regarding his neck pain, patient was given lidoderm patch and multiple regimens of pain medications, and his pain waxed and waned. In regards to this dysphagia, patient reports he could not swallow anything. He states that water was okay, but other food with same consistency of water could not be swallowed. He repeatedly refused to undergo speech and swallow evaluation. Pt. EEG was without any overt epileptic activity.    Patient had flat affect during encounters, and given vague symptoms and neurological symptoms that did not correlate physiologically, psychiatry was called to evaluate.  Pt was started on seroquel and ativan PRN for agitation. Pt unable to tolerate PO but seen to able to have no difficulty with swallowing. Pt. now has concern for right jaw pain without clear etiology or source. Pt. passed S&S but had episodes of emesis. Pt has esophagram performed which showed 13 mm barium tablet stuck at the level of the midesophagus on initial and 20 minute delayed images of uncertain etiology. Mildly delayed passage of contrast from the stomach into the duodenum. Pt underwent EGD with GI which showed Erythematous mucosa in the stomach  . Pt. was evalauted by psych patient now denies SI/ HI or plan for suicide attempt. Pt has capacity to leave against medical advice. Pt with various symptomatolgy and no clear organic cause is likely suffering from conversion disorder for which close outpatient follow up and psychotherapy is recommended. Patient and family at bedside was explained all of the negative finding and that this is conversion disorder. Pt is stable for medical discharge and close outpatient follow up. Pt is able to eat and drink and can have Tylenol and ibuprofen for pain. 22M significant PMHx who presents to the ED with L-sided facial droop and recent seizure-like activity. Patient is accompanied by his uncle at bedside who helped provide history. Patient reports that he was in his usual state of until 4-days ago when he developed sudden-onset L-sided facial droop as well as inability to close his R eye. He reports that he was unable to eat/drink due to his droop which concerned him and so he went to [facility] the following day for further evaluation. Patient was treated with Prednisone and Valtrex for presumed Bell's Palsy and he was discharged home. After discharge, patient remained concerned about his symptoms, and he ultimately went to [facility] 2 separate times for further evaluation. He was presumed to have Bell's Palsy at [facility] as well and was given the same treatment. Patient reports that over the past 2 days, he has been having a severe, diffuse headache as well has R neck pain. He reports numbness on the R side of his face as well. In addition, per patient's uncle, patient has had 3 separate episodes of seizure-like activity over the past 2 days. Patient's whole body reportedly shook for 20-25 seconds each time which was followed a brief episode of unresponsiveness. No reported bowel/urinary incontinence or tongue biting during these episodes. Since patient's symptoms appeared to be worsening, he came to [facility] for further evaluation. Patient states that he had a fever and cough ~ 2 months ago. He explains that he was given 2 antibiotics by his PMD at that time, but he is unsure what those antibiotics were. No further episodes of fever. On ROS, patient endorses poor PO intake, occasional nausea, and intermittent "burning chest pain," but denies any shortness of breath, vomiting, diarrhea, dysuria, or rash. No recent sick contacts.    Patient admitted for evaluation of facial droop and weakness. Patient underwent an MRI without any acute findings. He was continued on his prednisone and valtrex for possible bells, without improvement in his symptoms. His facial droop waxed and waned throughout conversation and evaluation each morning. Patient was able to speak in soft voice without much difficulty, but when prompted to open mouth for evaluation, was unable to do so. Differential for facial droop and weakness included bells palsy vs conversion disorder, however per neurology, and LP was done to rule out other pathology. LP shows no evidence of infection or other etiology of the patient's symptoms. Throughout hospitalization, patient continued to complain pain over his right neck, relieved with massage, inability to close his left eye, chest pain, shortness of breath and dysphagia. He had multiple EKGs done which showed no evidence of ischemia, all laboratory work was within normal limits and CXR were clear. Patient was hemodynamically stable with each episode of chest pain. Furthermore, regarding his neck pain, patient was given lidoderm patch and multiple regimens of pain medications, and his pain waxed and waned. In regards to this dysphagia, patient reports he could not swallow anything. He states that water was okay, but other food with same consistency of water could not be swallowed. He repeatedly refused to undergo speech and swallow evaluation. Pt. EEG was without any overt epileptic activity.    Patient had flat affect during encounters, and given vague symptoms and neurological symptoms that did not correlate physiologically, psychiatry was called to evaluate.  Pt was started on seroquel and ativan PRN for agitation. Pt unable to tolerate PO but seen to able to have no difficulty with swallowing. Pt. now has concern for right jaw pain without clear etiology or source. Pt. passed S&S but had episodes of emesis though on further evaluation was slight spit up as opposed to actual vomitus. Pt has esophagram performed which showed 13 mm barium tablet stuck at the level of the midesophagus on initial and 20 minute delayed images of uncertain etiology. Mildly delayed passage of contrast from the stomach into the duodenum. Pt underwent EGD with GI which showed Erythematous mucosa in the stomach but no other anatomic abnormalities to explain his symptoms. Pt. was evalauted by psych patient now denies SI/ HI or plan for suicide attempt. Pt has capacity to leave against medical advice. Pt with various symptomatolgy and no clear organic cause is likely suffering from conversion disorder for which close outpatient follow up and psychotherapy is recommended. Patient and family at bedside was explained all of the negative finding and that this is symptoms are psychogenic. Pt is stable for medical discharge and close outpatient follow up. Pt is able to eat and drink and can have Tylenol and ibuprofen for pain.

## 2018-04-14 NOTE — PROGRESS NOTE ADULT - SUBJECTIVE AND OBJECTIVE BOX
Dixie Christy PGY 1  Pager: [phone]/ [phone]    Patient is a 22y old  Male who presents with a chief complaint of Facial droop, Seizure-like activity (08 Apr 2018 05:26)    SUBJECTIVE / OVERNIGHT EVENTS:  Patient seen and examined at bedside with no acute events overnight. Patient reports headache and inability to swallow.    MEDICATIONS  (STANDING):  artificial  tears Solution 1 Drop(s) Both EYES two times a day  artificial tears (preservative free) Ophthalmic Solution 1 Drop(s) Right EYE four times a day  dextrose 5% + sodium chloride 0.9%. 1000 milliLiter(s) (100 mL/Hr) IV Continuous <Continuous>  lidocaine   Patch 1 Patch Transdermal daily  lidocaine 1% (Preservative-free) Injectable 10 milliLiter(s) Local Injection once  LORazepam     Tablet 0.5 milliGRAM(s) Oral two times a day  pantoprazole    Tablet 40 milliGRAM(s) Oral before breakfast    MEDICATIONS  (PRN):  acetaminophen   Tablet. 650 milliGRAM(s) Oral every 6 hours PRN Mild Pain (1 - 3)  ibuprofen  Tablet 400 milliGRAM(s) Oral every 8 hours PRN moderate to severe pain  LORazepam   Injectable 0.5 milliGRAM(s) IntraMuscular every 6 hours PRN severe agitation  ondansetron   Disintegrating Tablet 4 milliGRAM(s) Oral three times a day PRN Nausea and/or Vomiting  traMADol 25 milliGRAM(s) Oral every 6 hours PRN Moderate Pain (4 - 6)      OBJECTIVE:    Vital Signs Last 24 Hrs  T(C): 36.3 (14 Apr 2018 06:03), Max: 36.9 (13 Apr 2018 12:37)  T(F): 97.4 (14 Apr 2018 06:03), Max: 98.5 (13 Apr 2018 15:40)  HR: 70 (14 Apr 2018 06:03) (66 - 84)  BP: 104/67 (14 Apr 2018 06:03) (98/64 - 113/74)  BP(mean): --  RR: 18 (14 Apr 2018 06:03) (18 - 20)  SpO2: 99% (14 Apr 2018 06:03) (99% - 100%)    CAPILLARY BLOOD GLUCOSE        I&O's Summary    13 Apr 2018 07:01  -  14 Apr 2018 07:00  --------------------------------------------------------  IN: 2000 mL / OUT: 900 mL / NET: 1100 mL        PHYSICAL EXAM:  GENERAL: NAD, well-developed  HEAD:  Atraumatic, Normocephalic  NECK: Supple, No JVD  CHEST/LUNG: Clear to auscultation bilaterally; No wheeze  HEART: Regular rate and rhythm; No murmurs, rubs, or gallops  ABDOMEN: Soft, Nontender, Nondistended; Bowel sounds present  EXTREMITIES:  2+ Peripheral Pulses, No clubbing, cyanosis, or edema  PSYCH: flat affect, not answering questions   NEUROLOGY: facial droop markedly improved when patient evaluated for shortness of breath  SKIN: No rashes or lesions        LABS:                        16.0   8.81  )-----------( 237      ( 13 Apr 2018 16:15 )             47.5     Auto Eosinophil # x     / Auto Eosinophil % x     / Auto Neutrophil # x     / Auto Neutrophil % x     / BANDS % x        04-13    140  |  101  |  19  ----------------------------<  137<H>  3.5   |  24  |  1.00    Ca    8.7      13 Apr 2018 16:16  TPro  7.0  /  Alb  3.9  /  TBili  0.5  /  DBili  x   /  AST  15  /  ALT  26  /  AlkPhos  75  04-13

## 2018-04-14 NOTE — DISCHARGE NOTE ADULT - CARE PROVIDERS DIRECT ADDRESSES
[email or web address]

## 2018-04-14 NOTE — PROGRESS NOTE ADULT - ATTENDING COMMENTS
awaiting rpt swallow eval  awaiting EEG  I suspect psychosomatic / conversion disorder  psych follow up

## 2018-04-14 NOTE — DISCHARGE NOTE ADULT - INSTRUCTIONS
Diet as tolerated. Meds reviewd with kevin and . Made aware of reason for taking medication what times to take and when next dose is due.

## 2018-04-14 NOTE — DISCHARGE NOTE ADULT - PATIENT PORTAL LINK FT
You can access the [facility] Portal, offered by [facility], by registering with the following website: [email or web address]

## 2018-04-14 NOTE — DISCHARGE NOTE ADULT - PLAN OF CARE
Resolution You presented to the You presented to the hospital due to burning chest pain, right sided neck pain with radiation to the jaw, left sided facial droop, with loss of movement on right side, and difficulty swallowing. MRI/CT head were negative. EEG was negative, and lumbar puncture which was negative, upper endoscopy and swallow studies were negative. Your symptoms in the setting of new stressors are likely psychogenic and believed to be from conversion disorder. Please follow up outpatient with the psychotherapist from referral list given by . Please continue to take medication as prescribed. You presented to the hospital due to burning chest pain, right sided neck pain with radiation to the jaw, left sided facial droop, with loss of movement on right side, and difficulty swallowing. MRI/CT head were negative. EEG was negative, and lumbar puncture which was negative, upper endoscopy and swallow studies were negative. Your symptoms in the setting of new stressors are likely psychogenic and believed to be from conversion disorder. Please follow up outpatient with GI clinic in 1 month. Please continue to take medication as prescribed.

## 2018-04-14 NOTE — PROGRESS NOTE ADULT - PROBLEM SELECTOR PLAN 5
- patient noted to have a flat affect during the encounter  - answers questions slowly, and unwilling to participate today   - physical exam findings do not correlate with organic physiology  - psych input appreciated- .5 ativan BID standing and .5IV PRN q6 for agitation/ anxiety

## 2018-04-14 NOTE — DISCHARGE NOTE ADULT - ADDITIONAL INSTRUCTIONS
Please continue with ativan/ seroquel/ protonix for 2 weeks. Please follow up with psychiatry.  Please follow up with PCP within 1 week of hospital discharge. Please continue with ativan/ seroquel/ protonix for 2 weeks. Please follow up with psychiatry/ psychotherapy for further treatment for conversion disorder.   Please follow up with PCP within 1 week of hospital discharge. Please continue with ativan/ seroquel/ protonix for 2 weeks. Please follow up with psychiatry/ psychotherapy for further treatment for conversion disorder.  Outpatient GI follow up in 1 month of discharge. [phone]  Please follow up with PCP within 1 week of hospital discharge. Please continue with ativan/ seroquel/ protonix for 2 weeks. Follow-up with outpatient psychotherapy:    [facility] [phone]  [facility] [phone] - NOTE: Please go to Walk-in Clinic first in order to obtain an appointment for Outpatient Clinic    Behavioral Health Clinics in Zip: [id number]- From [facility]  1)      [facility] - [phone]  2)      [facility] - [phone]  3)      [facility] - [phone]    Outpatient GI follow up in 1 month of discharge. [phone]  Please follow up with PCP within 1 week of hospital discharge.

## 2018-04-14 NOTE — PROGRESS NOTE ADULT - PROBLEM SELECTOR PLAN 2
- patient with shortness of breath and chest pain overnight and during day  - no indication of pathology- chest examination clear, EKG normal sinus, and O2 saturations 100%  - will follow up chest xray  - shortness of breath may be 2/2 to anxiety- will start ativan per psych recommendations

## 2018-04-14 NOTE — PROGRESS NOTE ADULT - PROBLEM SELECTOR PLAN 6
- DVT PPx:  IMPROVE score= 1, no pharm  dvt ppx  - regular diet  - PT 4/12 dc home w/ outpatient therapy

## 2018-04-14 NOTE — DISCHARGE NOTE ADULT - CARE PLAN
Principal Discharge DX:	Facial droop  Secondary Diagnosis:	Dysphagia  Secondary Diagnosis:	Unresponsive episode Principal Discharge DX:	Conversion disorder  Goal:	Resolution  Secondary Diagnosis:	Dysphagia Principal Discharge DX:	Conversion disorder  Goal:	Resolution  Assessment and plan of treatment:	You presented to the  Secondary Diagnosis:	Dysphagia Principal Discharge DX:	Conversion disorder  Goal:	Resolution  Assessment and plan of treatment:	You presented to the hospital due to burning chest pain, right sided neck pain with radiation to the jaw, left sided facial droop, with loss of movement on right side, and difficulty swallowing. MRI/CT head were negative. EEG was negative, and lumbar puncture which was negative, upper endoscopy and swallow studies were negative. Your symptoms in the setting of new stressors are likely psychogenic and believed to be from conversion disorder. Please follow up outpatient with the psychotherapist from referral list given by . Please continue to take medication as prescribed.  Secondary Diagnosis:	Dysphagia  Goal:	Resolution  Assessment and plan of treatment:	You presented to the hospital due to burning chest pain, right sided neck pain with radiation to the jaw, left sided facial droop, with loss of movement on right side, and difficulty swallowing. MRI/CT head were negative. EEG was negative, and lumbar puncture which was negative, upper endoscopy and swallow studies were negative. Your symptoms in the setting of new stressors are likely psychogenic and believed to be from conversion disorder. Please follow up outpatient with GI clinic in 1 month. Please continue to take medication as prescribed.

## 2018-04-14 NOTE — DISCHARGE NOTE ADULT - OTHER SIGNIFICANT FINDINGS
< from: CT Head No Cont (04.08.18 @ 01:02) >  IMPRESSION:   No CT evidence of acute intracranial hemorrhage, mass or infarct.     < end of copied text >    < from: MR Head No Cont (04.08.18 @ 12:07) >  The fourth, third and lateral ventricles are normal in size and position.   There is no hemorrhage, mass or shift of the midline structures. No   abnormal signal intensity is identified within the brain parenchyma on   the T1, T2 or FLAIR sequences. No acute infarcts or hemorrhage are   identified.    The sellar and parasellar regions are unremarkable.      IMPRESSION: Unremarkable MRI of the brain.    < end of copied text >  Clinical Impression:  There were no epileptiform abnormalities recorded.      IMPRESSION:     1. 13 mm barium tablet stuck at the level of the midesophagus on initial   and 20 minute delayed images of uncertain etiology.  2. Mildly delayed passage of contrast from the stomach into the duodenum.    EGD  Impression:          - Erythematous mucosa in the stomach. Biopsied.  Recommendation:      - Await pathology results.                       - Diet as tolerated

## 2018-04-14 NOTE — DISCHARGE NOTE ADULT - MEDICATION SUMMARY - MEDICATIONS TO STOP TAKING
I will STOP taking the medications listed below when I get home from the hospital:    predniSONE 20 mg oral tablet  -- 3 tab(s) by mouth once a day    Valtrex 1 g oral tablet  -- 1 tab(s) by mouth 3 times a day

## 2018-04-15 DIAGNOSIS — R45.851 SUICIDAL IDEATIONS: ICD-10-CM

## 2018-04-15 PROCEDURE — 95819 EEG AWAKE AND ASLEEP: CPT | Mod: 26

## 2018-04-15 PROCEDURE — 99233 SBSQ HOSP IP/OBS HIGH 50: CPT

## 2018-04-15 RX ORDER — ACETAMINOPHEN 500 MG
1000 TABLET ORAL ONCE
Qty: 0 | Refills: 0 | Status: DISCONTINUED | OUTPATIENT
Start: 2018-04-15 | End: 2018-04-16

## 2018-04-15 RX ORDER — OXYCODONE AND ACETAMINOPHEN 5; 325 MG/1; MG/1
1 TABLET ORAL ONCE
Qty: 0 | Refills: 0 | Status: DISCONTINUED | OUTPATIENT
Start: 2018-04-15 | End: 2018-04-15

## 2018-04-15 RX ORDER — OXYCODONE AND ACETAMINOPHEN 5; 325 MG/1; MG/1
2 TABLET ORAL EVERY 4 HOURS
Qty: 0 | Refills: 0 | Status: DISCONTINUED | OUTPATIENT
Start: 2018-04-15 | End: 2018-04-16

## 2018-04-15 RX ADMIN — OXYCODONE AND ACETAMINOPHEN 1 TABLET(S): 5; 325 TABLET ORAL at 12:59

## 2018-04-15 RX ADMIN — Medication 0.5 MILLIGRAM(S): at 05:05

## 2018-04-15 RX ADMIN — OXYCODONE AND ACETAMINOPHEN 2 TABLET(S): 5; 325 TABLET ORAL at 21:06

## 2018-04-15 RX ADMIN — Medication 1 DROP(S): at 05:05

## 2018-04-15 RX ADMIN — Medication 1 DROP(S): at 12:09

## 2018-04-15 RX ADMIN — Medication 0.5 MILLIGRAM(S): at 11:59

## 2018-04-15 RX ADMIN — ONDANSETRON 4 MILLIGRAM(S): 8 TABLET, FILM COATED ORAL at 17:09

## 2018-04-15 RX ADMIN — OXYCODONE AND ACETAMINOPHEN 2 TABLET(S): 5; 325 TABLET ORAL at 20:00

## 2018-04-15 RX ADMIN — OXYCODONE AND ACETAMINOPHEN 1 TABLET(S): 5; 325 TABLET ORAL at 11:59

## 2018-04-15 RX ADMIN — Medication 1 DROP(S): at 16:24

## 2018-04-15 RX ADMIN — Medication 1 DROP(S): at 05:06

## 2018-04-15 NOTE — PROGRESS NOTE ADULT - SUBJECTIVE AND OBJECTIVE BOX
Note Author: Anish Bone, PGY 2  [facility] Pager: [phone]  [facility] Pager: [phone]  Spectra: [phone] on 4/15 from 7am to 12pm      Patient is a 22y old  Male who presents with a chief complaint of Facial droop, Seizure-like activity (14 Apr 2018 12:20)      SUBJECTIVE / OVERNIGHT EVENTS:  No acute events overnight. Patient seen and examined in AM.     Patient denied fevers, CP, SOB, lower extremity pain.     MEDICATIONS  (STANDING):  artificial  tears Solution 1 Drop(s) Both EYES two times a day  artificial tears (preservative free) Ophthalmic Solution 1 Drop(s) Right EYE four times a day  dextrose 5% + sodium chloride 0.9%. 1000 milliLiter(s) (100 mL/Hr) IV Continuous <Continuous>  lidocaine   Patch 1 Patch Transdermal daily  lidocaine 1% (Preservative-free) Injectable 10 milliLiter(s) Local Injection once  LORazepam     Tablet 0.5 milliGRAM(s) Oral two times a day  pantoprazole    Tablet 40 milliGRAM(s) Oral before breakfast    MEDICATIONS  (PRN):  acetaminophen   Tablet. 650 milliGRAM(s) Oral every 6 hours PRN Mild Pain (1 - 3)  ibuprofen  Tablet 400 milliGRAM(s) Oral every 8 hours PRN moderate to severe pain  LORazepam     Tablet 0.5 milliGRAM(s) Oral every 6 hours PRN Anxiety  LORazepam   Injectable 0.5 milliGRAM(s) IV Push every 6 hours PRN severe agitation  ondansetron   Disintegrating Tablet 4 milliGRAM(s) Oral three times a day PRN Nausea and/or Vomiting  traMADol 25 milliGRAM(s) Oral every 6 hours PRN Moderate Pain (4 - 6)      CAPILLARY BLOOD GLUCOSE      POCT Blood Glucose.: 95 mg/dL (14 Apr 2018 08:35)    I&O's Summary      Vital Signs Last 24 Hrs  T(C): 36.9 (14 Apr 2018 21:30), Max: 36.9 (14 Apr 2018 21:30)  T(F): 98.5 (14 Apr 2018 21:30), Max: 98.5 (14 Apr 2018 21:30)  HR: 80 (15 Apr 2018 04:58) (53 - 80)  BP: 121/81 (15 Apr 2018 04:58) (108/62 - 121/81)  BP(mean): --  RR: 16 (15 Apr 2018 04:58) (16 - 18)  SpO2: 100% (15 Apr 2018 04:58) (100% - 100%)    PHYSICAL EXAM:  General: NAD, well-developed  Eyes: EOMI  Neck: Supple, No JVD  Chest/Lung: Clear to auscultation bilaterally; No wheezes  Heart: Regular rate and rhythm; No murmurs, rubs, or gallops. Capillary refill WNL  Abdome: Soft, Nontender, Nondistended; Bowel sounds present  Extremities: No lower extremity edema.   Psych: AAOx3  Neurology: non-focal, strength and sensation grossly intact UE and LE BL. No spinal TTP  Skin: No rashes or lesions    LABS:                        16.0   8.81  )-----------( 237      ( 13 Apr 2018 16:15 )             47.5       04-13    140  |  101  |  19  ----------------------------<  137<H>  3.5   |  24  |  1.00    Ca    8.7      13 Apr 2018 16:16    TPro  7.0  /  Alb  3.9  /  TBili  0.5  /  DBili  x   /  AST  15  /  ALT  26  /  AlkPhos  75  04-13    RADIOLOGY & ADDITIONAL TESTS:    Imaging Personally Reviewed:    Consultant(s) Notes Reviewed:      Care Discussed with Consultants/Other Providers: Note Author: Anish Bone, PGY 2  [facility] Pager: [phone]  [facility] Pager: [phone]  Spectra: [phone] on 4/15 from 7am to 12pm      Patient is a 22y old  Male who presents with a chief complaint of Facial droop, Seizure-like activity (14 Apr 2018 12:20)      SUBJECTIVE / OVERNIGHT EVENTS:  No acute events overnight. Patient seen and examined in AM.  Pt had 2 episodes unobserved vomiting, said had CP at that time now resolved, said had continuous headache, as bad a 9/10, has been ongoing for past few days, no changes with headache this, no woken from sleep, no need photosensitivity. Pt denied eating to me and to nurse, but PCA confirmed that pt ate 2 pancakes.     Patient denied fevers, SOB, lower extremity pain.     MEDICATIONS  (STANDING):  artificial  tears Solution 1 Drop(s) Both EYES two times a day  artificial tears (preservative free) Ophthalmic Solution 1 Drop(s) Right EYE four times a day  dextrose 5% + sodium chloride 0.9%. 1000 milliLiter(s) (100 mL/Hr) IV Continuous <Continuous>  lidocaine   Patch 1 Patch Transdermal daily  lidocaine 1% (Preservative-free) Injectable 10 milliLiter(s) Local Injection once  LORazepam     Tablet 0.5 milliGRAM(s) Oral two times a day  pantoprazole    Tablet 40 milliGRAM(s) Oral before breakfast    MEDICATIONS  (PRN):  acetaminophen   Tablet. 650 milliGRAM(s) Oral every 6 hours PRN Mild Pain (1 - 3)  ibuprofen  Tablet 400 milliGRAM(s) Oral every 8 hours PRN moderate to severe pain  LORazepam     Tablet 0.5 milliGRAM(s) Oral every 6 hours PRN Anxiety  LORazepam   Injectable 0.5 milliGRAM(s) IV Push every 6 hours PRN severe agitation  ondansetron   Disintegrating Tablet 4 milliGRAM(s) Oral three times a day PRN Nausea and/or Vomiting  traMADol 25 milliGRAM(s) Oral every 6 hours PRN Moderate Pain (4 - 6)      CAPILLARY BLOOD GLUCOSE      POCT Blood Glucose.: 95 mg/dL (14 Apr 2018 08:35)    I&O's Summary      Vital Signs Last 24 Hrs  T(C): 36.9 (14 Apr 2018 21:30), Max: 36.9 (14 Apr 2018 21:30)  T(F): 98.5 (14 Apr 2018 21:30), Max: 98.5 (14 Apr 2018 21:30)  HR: 80 (15 Apr 2018 04:58) (53 - 80)  BP: 121/81 (15 Apr 2018 04:58) (108/62 - 121/81)  BP(mean): --  RR: 16 (15 Apr 2018 04:58) (16 - 18)  SpO2: 100% (15 Apr 2018 04:58) (100% - 100%)    PHYSICAL EXAM:  GENERAL: NAD, well-developed  HEAD:  Atraumatic, Normocephalic  NECK: Supple, No JVD  CHEST/LUNG: Clear to auscultation bilaterally; No wheeze  HEART: Regular rate and rhythm; No murmurs, rubs, or gallops  ABDOMEN: Soft, Nontender, Nondistended; Bowel sounds present  EXTREMITIES:  2+ Peripheral Pulses, No clubbing, cyanosis, or edema  PSYCH: flat affect, not answering questions   NEUROLOGY: facial droop markedly improved when patient evaluated for shortness of breath  SKIN: No rashes or lesions    LABS:                        16.0   8.81  )-----------( 237      ( 13 Apr 2018 16:15 )             47.5       04-13    140  |  101  |  19  ----------------------------<  137<H>  3.5   |  24  |  1.00    Ca    8.7      13 Apr 2018 16:16    TPro  7.0  /  Alb  3.9  /  TBili  0.5  /  DBili  x   /  AST  15  /  ALT  26  /  AlkPhos  75  04-13    RADIOLOGY & ADDITIONAL TESTS:    Imaging Personally Reviewed:    Consultant(s) Notes Reviewed:      Care Discussed with Consultants/Other Providers:

## 2018-04-15 NOTE — EEG REPORT - NS EEG TEXT BOX
BRITNEY GARCIA [id number]     Study Date: 		04-15-18    ROUTINE EEG    Technical Information:			  		  Placement and Labeling of Electrodes:  The EEG was performed utilizing 20 channels referential EEG connections (coronal over temporal over parasagittal montage) using all standard 10-20 electrode placements with EKG.  Recording was at a sampling rate of 256 samples per second per channel.  Time synchronized digital video recording was done simultaneously with EEG recording.  A low light infrared camera was used for low light recording.  Garry and seizure detection algorithms were utilized.    CSA Technical Component:  Quantitative EEG analysis using a separate Compressed Spectral Array (CSA) software package was conducted in real-time and run at bedside after set up by the technician, digitally displaying the power of electrographic frequencies included in the 1-30Hz band using a graded color map.  This data was reviewed and interpreted independently, and is reported in a separate section below.    --------------------------------------------------------------------------------------------------  History:  CC/ HPI Patient is a 22y old  Male who presents with a chief complaint of Facial droop, Seizure-like activity (14 Apr 2018 12:20)    MEDICATIONS  (STANDING):  acetaminophen  IVPB. 1000 milliGRAM(s) IV Intermittent once  artificial  tears Solution 1 Drop(s) Both EYES two times a day  artificial tears (preservative free) Ophthalmic Solution 1 Drop(s) Right EYE four times a day  dextrose 5% + sodium chloride 0.9%. 1000 milliLiter(s) (100 mL/Hr) IV Continuous <Continuous>  lidocaine   Patch 1 Patch Transdermal daily  lidocaine 1% (Preservative-free) Injectable 10 milliLiter(s) Local Injection once  LORazepam     Tablet 0.5 milliGRAM(s) Oral two times a day  pantoprazole    Tablet 40 milliGRAM(s) Oral before breakfast    --------------------------------------------------------------------------------------------------  Study Interpretation:    FINDINGS:  The background was continuous, spontaneously variable and reactive.  During wakefulness, the posteriorly dominant rhythm consisted of symmetric, well modulated 8 Hz activity, with an amplitude to 30 uV, that attenuated to eye opening.  Low amplitude central beta was noted in wakefulness.    Background Slowing:  Generalized slowing: none was present.  Focal slowing: none was present.    Sleep Background:  Drowsiness was characterized by fragmentation, attenuation, and slowing of the background activity.    Stage II sleep transients were not recorded.    Epileptiform Activity:   No epileptiform discharges were present.    Events:  No clinical events were recorded.  No seizures were recorded.    Activation Procedures:   -Hyperventilation was not performed.    -Photic stimulation was not performed.      Artifacts:  Intermittent myogenic and movement artifacts were noted.    ECG:  The heart rate on single channel ECG was predominantly between 60-70BPM.  -----------------------------------------------------------------------------------------------------    EEG Classification / Summary:  Normal EEG Study in awake and drowsy states   -  -----------------------------------------------------------------------------------------------------    Clinical Impression:  There were no epileptiform abnormalities recorded.      Kaykay Kelly, DO  Neurophysiology Fellow BRITNEY GARCIA [id number]     Study Date: 		04-15-18    ROUTINE EEG    Technical Information:			  		  Placement and Labeling of Electrodes:  The EEG was performed utilizing 20 channels referential EEG connections (coronal over temporal over parasagittal montage) using all standard 10-20 electrode placements with EKG.  Recording was at a sampling rate of 256 samples per second per channel.  Time synchronized digital video recording was done simultaneously with EEG recording.  A low light infrared camera was used for low light recording.  Garry and seizure detection algorithms were utilized.    CSA Technical Component:  Quantitative EEG analysis using a separate Compressed Spectral Array (CSA) software package was conducted in real-time and run at bedside after set up by the technician, digitally displaying the power of electrographic frequencies included in the 1-30Hz band using a graded color map.  This data was reviewed and interpreted independently, and is reported in a separate section below.    --------------------------------------------------------------------------------------------------  History:  CC/ HPI Patient is a 22y old  Male who presents with a chief complaint of Facial droop, Seizure-like activity (14 Apr 2018 12:20)    MEDICATIONS  (STANDING):  acetaminophen  IVPB. 1000 milliGRAM(s) IV Intermittent once  artificial  tears Solution 1 Drop(s) Both EYES two times a day  artificial tears (preservative free) Ophthalmic Solution 1 Drop(s) Right EYE four times a day  dextrose 5% + sodium chloride 0.9%. 1000 milliLiter(s) (100 mL/Hr) IV Continuous <Continuous>  lidocaine   Patch 1 Patch Transdermal daily  lidocaine 1% (Preservative-free) Injectable 10 milliLiter(s) Local Injection once  LORazepam     Tablet 0.5 milliGRAM(s) Oral two times a day  pantoprazole    Tablet 40 milliGRAM(s) Oral before breakfast    --------------------------------------------------------------------------------------------------  Study Interpretation:    FINDINGS:  The background was continuous, spontaneously variable and reactive.  During wakefulness, the posteriorly dominant rhythm consisted of symmetric, well modulated 8 Hz activity, with an amplitude to 30 uV, that attenuated to eye opening.  Low amplitude central beta was noted in wakefulness.    Background Slowing:  Generalized slowing: none was present.  Focal slowing: none was present.    Sleep Background:  Drowsiness was characterized by fragmentation, attenuation, and slowing of the background activity.    Stage II sleep transients were not recorded.    Epileptiform Activity:   No epileptiform discharges were present.    Events:  No clinical events were recorded.  No seizures were recorded.    Activation Procedures:   -Hyperventilation was not performed.    -Photic stimulation was not performed.      Artifacts:  Intermittent myogenic and movement artifacts were noted.    ECG:  The heart rate on single channel ECG was predominantly between 60-70BPM.  -----------------------------------------------------------------------------------------------------    EEG Classification / Summary:  Normal EEG Study in awake and drowsy states   -  -----------------------------------------------------------------------------------------------------    Clinical Impression:  There were no epileptiform abnormalities recorded.      Kaykay Kelly DO  Neurophysiology Fellow    Hugo Peraza MD  EEG / Epilepsy Attending Physician

## 2018-04-15 NOTE — PROGRESS NOTE ADULT - PROBLEM SELECTOR PLAN 1
- says when he has the headache he wants to kill himself, no plan, psych consulted x3 with no call back despite writing SI in page, started constant obs, gave percocet for pain control which improved pain  - would not be able to leave AMA given SI, would need stat psych consult overnight.

## 2018-04-15 NOTE — PROGRESS NOTE ADULT - ASSESSMENT
22M with no PMHx who presents to the ED with L-sided facial droop possibly 2/2 Bell's Palsy vs conversion disorder vs auotimmune neuro disorder?, also with recent seizure-like activity concerning for epilepsy vs psychogenic non epileptic seizure, s/p 7 day tx for [place] Palsy, with normal MRI now s/p LP yesterday 4/11, with chest pain and SOB.

## 2018-04-15 NOTE — PROGRESS NOTE ADULT - PROBLEM SELECTOR PLAN 3
- patient with shortness of breath and chest pain overnight and during day  - no indication of pathology- chest examination clear, EKG normal sinus, and O2 saturations 100%  - will follow up chest xray  - shortness of breath may be 2/2 to anxiety-   - c/w ativan per psych recommendations

## 2018-04-15 NOTE — PROGRESS NOTE ADULT - ATTENDING COMMENTS
per nursing ate pancakes this morning  endorsed SI to HS  EEG normal  I don't suspect organic etiology for symptoms, rather favor conversion, psychosomatic  place on 1:1  await psych re-eval per nursing ate pancakes this morning  endorsed SI to HS  EEG normal  I don't suspect organic etiology for symptoms, rather favor conversion, psychosomatic - might benefit from ECT   place on 1:1  await psych re-eval

## 2018-04-16 LAB — MISCELLANEOUS - CHEM: SIGNIFICANT CHANGE UP

## 2018-04-16 PROCEDURE — 99233 SBSQ HOSP IP/OBS HIGH 50: CPT

## 2018-04-16 PROCEDURE — 99233 SBSQ HOSP IP/OBS HIGH 50: CPT | Mod: GC

## 2018-04-16 RX ORDER — FAMOTIDINE 10 MG/ML
20 INJECTION INTRAVENOUS
Qty: 0 | Refills: 0 | Status: DISCONTINUED | OUTPATIENT
Start: 2018-04-16 | End: 2018-04-19

## 2018-04-16 RX ORDER — ACETAMINOPHEN 500 MG
650 TABLET ORAL ONCE
Qty: 0 | Refills: 0 | Status: COMPLETED | OUTPATIENT
Start: 2018-04-16 | End: 2018-04-16

## 2018-04-16 RX ORDER — PANTOPRAZOLE SODIUM 20 MG/1
40 TABLET, DELAYED RELEASE ORAL
Qty: 0 | Refills: 0 | Status: DISCONTINUED | OUTPATIENT
Start: 2018-04-16 | End: 2018-04-19

## 2018-04-16 RX ORDER — QUETIAPINE FUMARATE 200 MG/1
50 TABLET, FILM COATED ORAL AT BEDTIME
Qty: 0 | Refills: 0 | Status: DISCONTINUED | OUTPATIENT
Start: 2018-04-16 | End: 2018-04-19

## 2018-04-16 RX ADMIN — Medication 650 MILLIGRAM(S): at 19:55

## 2018-04-16 RX ADMIN — Medication 1 DROP(S): at 00:05

## 2018-04-16 RX ADMIN — OXYCODONE AND ACETAMINOPHEN 2 TABLET(S): 5; 325 TABLET ORAL at 10:04

## 2018-04-16 RX ADMIN — Medication 1 DROP(S): at 23:08

## 2018-04-16 RX ADMIN — Medication 0.5 MILLIGRAM(S): at 02:53

## 2018-04-16 RX ADMIN — Medication 1 DROP(S): at 11:38

## 2018-04-16 RX ADMIN — Medication 0.5 MILLIGRAM(S): at 09:54

## 2018-04-16 RX ADMIN — Medication 1 DROP(S): at 05:49

## 2018-04-16 RX ADMIN — LIDOCAINE 1 PATCH: 4 CREAM TOPICAL at 11:38

## 2018-04-16 RX ADMIN — QUETIAPINE FUMARATE 50 MILLIGRAM(S): 200 TABLET, FILM COATED ORAL at 22:42

## 2018-04-16 RX ADMIN — OXYCODONE AND ACETAMINOPHEN 2 TABLET(S): 5; 325 TABLET ORAL at 11:00

## 2018-04-16 RX ADMIN — Medication 650 MILLIGRAM(S): at 18:55

## 2018-04-16 NOTE — PROGRESS NOTE ADULT - PROBLEM SELECTOR PLAN 3
- patient with shortness of breath and chest pain overnight and during day  - no indication of pathology- chest examination clear, EKG normal sinus, and O2 saturations 100%  - will follow up chest xray  - shortness of breath may be 2/2 to anxiety-   - c/w ativan per psych recommendations - patient with shortness of breath and chest pain overnight and during day  - no indication of pathology- chest examination clear, EKG normal sinus, and O2 saturations 100%  - chest xray WNL w/ clear lungs  - shortness of breath may be 2/2 to anxiety-   - c/w ativan per psych recommendations

## 2018-04-16 NOTE — PROGRESS NOTE BEHAVIORAL HEALTH - RISK ASSESSMENT
Moderate risk: Risk factors are suicidal ideation, male gender and recent medical diagnosis, protective factors include having a supportive family with whom he resides, employment, no prior psych hx. Moderate risk: Risk factors are suicidal ideation, male gender and recent medical diagnosis, protective factors include having a supportive family with whom he resides, employment, no prior psych hx, and denying any current plan to harm himself.

## 2018-04-16 NOTE — PROGRESS NOTE BEHAVIORAL HEALTH - NSBHFUPSUICINTERVALFT_PSY_A_CORE
Pt says he is thinking of killing himself because the pain in his chest and head are "too much" and the doctors "aren't doing anything for him, aren't treating him right." Pt states he was thinking of killing himself because the pain in his chest and head are "too much" and the doctors "aren't doing anything for him, aren't treating him right." Denies plan at this time.

## 2018-04-16 NOTE — PROVIDER CONTACT NOTE (OTHER) - SITUATION
Pt c/o headache 9/10, but is PO pain medication. Pt states "I cant take anything by mouth. I will throw up." RN offered Zofran disintegrating tablet and discussed how it works. Pt still refusing.

## 2018-04-16 NOTE — PROGRESS NOTE ADULT - SUBJECTIVE AND OBJECTIVE BOX
CONTACT INFO:  Amara Marley MD  Internal Medicine PGY1  Pager:  [phone]/ [phone]    M-F 7am-7pm:  pager covered by day team     *Academic conferences M-F 8am-9am & 12pm-1pm - page ONLY if urgent or if consultant  M-F & Sa-Sun 7pm-7am:  NS  page 1443 for Teams 1-3, 1446 for Team 4 & Care Model/ [facility] Page [id number] for T 1-3 and 18744 for T4 &CM  Sa-Sun 7am-12pm:  see patient chart, primary physician assigned available 7am-12pm  Sa-Sun 12pm-7pm:  NS  page 1443 for Teams 1-4, [facility]  page Covering Resident    BRITNEY GARCIA  22y  Male    Patient is a 22y old  Male who presents with a chief complaint of Facial droop, Seizure-like activity (14 Apr 2018 12:20)    22M with no PMHx who presents to the ED with L-sided facial droop possibly 2/2 Bell's Palsy vs conversion disorder vs auotimmune neuro disorder?, also with recent seizure-like activity concerning for epilepsy vs psychogenic non epileptic seizure, s/p 7 day tx for [place] Palsy, with normal MRI now s/p LP yesterday 4/11, with chest pain and SOB.    INTERVAL HPI / OVERNIGHT EVENTS:      OBJECTIVE:  Telemetry (24 Hrs):     Vitals Signs (24 Hrs):  T(C): 36.8 (04-16-18 @ 05:45), Max: 36.8 (04-16-18 @ 05:45)  HR: 64 (04-16-18 @ 05:45) (64 - 75)  BP: 90/52 (04-16-18 @ 05:45) (90/52 - 101/68)  RR: 18 (04-16-18 @ 05:45) (18 - 18)  SpO2: 98% (04-16-18 @ 05:45) (96% - 100%)    PHYSICAL EXAM:  General: Comfortable, no apparent distress  HEENT: Atraumatic; EOMI, PERRLA, conjunctiva and sclera clear; no tonsillar erythema/exudates/enlargement  Neck: Supple; no JVD; thyroid normal without masses or enlargement  Chest/Lungs: Clear to auscultation B/L; no rales, rhonchi or wheezing  Heart: Regular rate and rhythm; normal S1/S2; no murmurs, rubs, or gallops  Abdomen: Soft, nontender, nondistended; bowel sounds present  Extremities: PT/DP pulses 2+ B/L; no edema  Skin: No rashes or lesions  Neurological: Alert and oriented to person/place/time    LABS:              CAPILLARY BLOOD GLUCOSE              RADIOLOGY & ADDITIONAL TESTS:      MEDICATIONS:  acetaminophen   Tablet. 650 milliGRAM(s) Oral every 6 hours PRN Mild Pain (1 - 3)  acetaminophen  IVPB. 1000 milliGRAM(s) IV Intermittent once  artificial  tears Solution 1 Drop(s) Both EYES two times a day  artificial tears (preservative free) Ophthalmic Solution 1 Drop(s) Right EYE four times a day  dextrose 5% + sodium chloride 0.9%. 1000 milliLiter(s) (100 mL/Hr) IV Continuous <Continuous>  ibuprofen  Tablet 400 milliGRAM(s) Oral every 8 hours PRN moderate to severe pain  lidocaine   Patch 1 Patch Transdermal daily  lidocaine 1% (Preservative-free) Injectable 10 milliLiter(s) Local Injection once  LORazepam     Tablet 0.5 milliGRAM(s) Oral two times a day  LORazepam     Tablet 0.5 milliGRAM(s) Oral every 6 hours PRN Anxiety  LORazepam   Injectable 0.5 milliGRAM(s) IV Push every 6 hours PRN severe agitation  ondansetron   Disintegrating Tablet 4 milliGRAM(s) Oral three times a day PRN Nausea and/or Vomiting  oxyCODONE    5 mG/acetaminophen 325 mG 2 Tablet(s) Oral every 4 hours PRN Moderate Pain (4 - 6)  pantoprazole    Tablet 40 milliGRAM(s) Oral before breakfast      ALLERGIES:  No Known Allergies CONTACT INFO:  Amara Marley MD  Internal Medicine PGY1  Pager:  [phone]/ [phone]    M-F 7am-7pm:  pager covered by day team     *Academic conferences M-F 8am-9am & 12pm-1pm - page ONLY if urgent or if consultant  M-F & Sa-Sun 7pm-7am:  NS  page 1443 for Teams 1-3, 1446 for Team 4 & Care Model/ [facility] Page [id number] for T 1-3 and 72054 for T4 &CM  Sa-Sun 7am-12pm:  see patient chart, primary physician assigned available 7am-12pm  Sa-Sun 12pm-7pm:  NS  page 1443 for Teams 1-4, [facility]  page Covering Resident    BRITNEY GARCIA  22y  Male    Patient is a 22y old  Male who presents with a chief complaint of Facial droop, Seizure-like activity (14 Apr 2018 12:20)    22M with no PMHx who presents to the ED with L-sided facial droop possibly 2/2 Bell's Palsy vs conversion disorder vs auotimmune neuro disorder?, also with recent seizure-like activity concerning for epilepsy vs psychogenic non epileptic seizure, s/p 7 day tx for [place] Palsy, with normal MRI now s/p LP yesterday 4/11, with chest pain and SOB.    INTERVAL HPI / OVERNIGHT EVENTS: NAEON. Patient seen and examined in AM.  Pt had episodes vomiting when trying to eat( appears to be forced per night staff), said had CP at that time now resolved ,but associated with episodes of vomitting , said had continuous headache, as bad a 9/10, has been ongoing for past few days. Pt was seen by S&S but was minimally cooperative with study.    Patient denied fevers, SOB, lower extremity pain.       OBJECTIVE:  Telemetry (24 Hrs):     Vitals Signs (24 Hrs):  T(C): 36.8 (04-16-18 @ 05:45), Max: 36.8 (04-16-18 @ 05:45)  HR: 64 (04-16-18 @ 05:45) (64 - 75)  BP: 90/52 (04-16-18 @ 05:45) (90/52 - 101/68)  RR: 18 (04-16-18 @ 05:45) (18 - 18)  SpO2: 98% (04-16-18 @ 05:45) (96% - 100%)    PHYSICAL EXAM:  GENERAL: NAD, well-developed  HEAD:  Atraumatic, Normocephalic  NECK: Supple, No JVD  CHEST/LUNG: Clear to auscultation bilaterally; No wheeze  HEART: Regular rate and rhythm; No murmurs, rubs, or gallops  ABDOMEN: Soft, Nontender, Nondistended; Bowel sounds present  EXTREMITIES:  2+ Peripheral Pulses, No clubbing, cyanosis, or edema  PSYCH: flat affect, not answering questions   NEUROLOGY: facial droop markedly improved when patient evaluated for shortness of breath  SKIN: No rashes or lesions    LABS:              CAPILLARY BLOOD GLUCOSE              RADIOLOGY & ADDITIONAL TESTS:      MEDICATIONS:  acetaminophen   Tablet. 650 milliGRAM(s) Oral every 6 hours PRN Mild Pain (1 - 3)  acetaminophen  IVPB. 1000 milliGRAM(s) IV Intermittent once  artificial  tears Solution 1 Drop(s) Both EYES two times a day  artificial tears (preservative free) Ophthalmic Solution 1 Drop(s) Right EYE four times a day  dextrose 5% + sodium chloride 0.9%. 1000 milliLiter(s) (100 mL/Hr) IV Continuous <Continuous>  ibuprofen  Tablet 400 milliGRAM(s) Oral every 8 hours PRN moderate to severe pain  lidocaine   Patch 1 Patch Transdermal daily  lidocaine 1% (Preservative-free) Injectable 10 milliLiter(s) Local Injection once  LORazepam     Tablet 0.5 milliGRAM(s) Oral two times a day  LORazepam     Tablet 0.5 milliGRAM(s) Oral every 6 hours PRN Anxiety  LORazepam   Injectable 0.5 milliGRAM(s) IV Push every 6 hours PRN severe agitation  ondansetron   Disintegrating Tablet 4 milliGRAM(s) Oral three times a day PRN Nausea and/or Vomiting  oxyCODONE    5 mG/acetaminophen 325 mG 2 Tablet(s) Oral every 4 hours PRN Moderate Pain (4 - 6)  pantoprazole    Tablet 40 milliGRAM(s) Oral before breakfast      ALLERGIES:  No Known Allergies CONTACT INFO:  Amara Marley MD  Internal Medicine PGY1  Pager:  [phone]/ [phone]    M-F 7am-7pm:  pager covered by day team     *Academic conferences M-F 8am-9am & 12pm-1pm - page ONLY if urgent or if consultant  M-F & Sa-Sun 7pm-7am:  NS  page 1443 for Teams 1-3, 1446 for Team 4 & Care Model/ [facility] Page [id number] for T 1-3 and 67485 for T4 &CM  Sa-Sun 7am-12pm:  see patient chart, primary physician assigned available 7am-12pm  Sa-Sun 12pm-7pm:  NS  page 1443 for Teams 1-4, [facility]  page Covering Resident    BRITNEY GARCIA  22y  Male    Patient is a 22y old  Male who presents with a chief complaint of Facial droop, Seizure-like activity (14 Apr 2018 12:20)    22M with no PMHx who presents to the ED with L-sided facial droop possibly 2/2 Bell's Palsy vs conversion disorder vs auotimmune neuro disorder?, also with recent seizure-like activity concerning for epilepsy vs psychogenic non epileptic seizure, s/p 7 day tx for [place] Palsy, with normal MRI now s/p LP yesterday 4/11, with chest pain and SOB.    INTERVAL HPI / OVERNIGHT EVENTS: NAEON. Patient seen and examined in AM.  Pt had episodes vomiting when trying to eat( appears to be forced per night staff), said had CP at that time now resolved ,but associated with episodes of vomitting , said had continuous headache, as bad a 9/10, has been ongoing for past few days. Pt was seen by S&S but was minimally cooperative with study, seen by PT and was minimally cooperative.    Patient denied fevers, SOB, lower extremity pain.       OBJECTIVE:  Telemetry (24 Hrs):     Vitals Signs (24 Hrs):  T(C): 36.8 (04-16-18 @ 05:45), Max: 36.8 (04-16-18 @ 05:45)  HR: 64 (04-16-18 @ 05:45) (64 - 75)  BP: 90/52 (04-16-18 @ 05:45) (90/52 - 101/68)  RR: 18 (04-16-18 @ 05:45) (18 - 18)  SpO2: 98% (04-16-18 @ 05:45) (96% - 100%)    PHYSICAL EXAM:  GENERAL: NAD, well-developed  HEAD:  Atraumatic, Normocephalic  NECK: Supple, No JVD  CHEST/LUNG: Clear to auscultation bilaterally; No wheeze  HEART: Regular rate and rhythm; No murmurs, rubs, or gallops  ABDOMEN: Soft, Nontender, Nondistended; Bowel sounds present  EXTREMITIES:  2+ Peripheral Pulses, No clubbing, cyanosis, or edema  PSYCH: flat affect, not answering questions   NEUROLOGY: facial droop markedly improved when patient evaluated for shortness of breath  SKIN: No rashes or lesions    LABS:              CAPILLARY BLOOD GLUCOSE              RADIOLOGY & ADDITIONAL TESTS:      MEDICATIONS:  acetaminophen   Tablet. 650 milliGRAM(s) Oral every 6 hours PRN Mild Pain (1 - 3)  acetaminophen  IVPB. 1000 milliGRAM(s) IV Intermittent once  artificial  tears Solution 1 Drop(s) Both EYES two times a day  artificial tears (preservative free) Ophthalmic Solution 1 Drop(s) Right EYE four times a day  dextrose 5% + sodium chloride 0.9%. 1000 milliLiter(s) (100 mL/Hr) IV Continuous <Continuous>  ibuprofen  Tablet 400 milliGRAM(s) Oral every 8 hours PRN moderate to severe pain  lidocaine   Patch 1 Patch Transdermal daily  lidocaine 1% (Preservative-free) Injectable 10 milliLiter(s) Local Injection once  LORazepam     Tablet 0.5 milliGRAM(s) Oral two times a day  LORazepam     Tablet 0.5 milliGRAM(s) Oral every 6 hours PRN Anxiety  LORazepam   Injectable 0.5 milliGRAM(s) IV Push every 6 hours PRN severe agitation  ondansetron   Disintegrating Tablet 4 milliGRAM(s) Oral three times a day PRN Nausea and/or Vomiting  oxyCODONE    5 mG/acetaminophen 325 mG 2 Tablet(s) Oral every 4 hours PRN Moderate Pain (4 - 6)  pantoprazole    Tablet 40 milliGRAM(s) Oral before breakfast      ALLERGIES:  No Known Allergies CONTACT INFO:  Amara Marley MD  Internal Medicine PGY1  Pager:  [phone]/ [phone]    M-F 7am-7pm:  pager covered by day team     *Academic conferences M-F 8am-9am & 12pm-1pm - page ONLY if urgent or if consultant  M-F & Sa-Sun 7pm-7am:  NS  page 1443 for Teams 1-3, 1446 for Team 4 & Care Model/ [facility] Page [id number] for T 1-3 and 25115 for T4 &CM  Sa-Sun 7am-12pm:  see patient chart, primary physician assigned available 7am-12pm  Sa-Sun 12pm-7pm:  NS  page 1443 for Teams 1-4, [facility]  page Covering Resident    BRITNEY GARCIA  22y  Male    Patient is a 22y old  Male who presents with a chief complaint of Facial droop, Seizure-like activity (14 Apr 2018 12:20)    22M with no PMHx who presents to the ED with L-sided facial droop possibly 2/2 Bell's Palsy vs conversion disorder vs auotimmune neuro disorder?, also with recent seizure-like activity concerning for epilepsy vs psychogenic non epileptic seizure, s/p 7 day tx for [place] Palsy, with normal MRI now s/p LP yesterday 4/11, with chest pain and SOB.    INTERVAL HPI / OVERNIGHT EVENTS: NAEON. Patient seen and examined in AM.  Pt had episodes vomiting when trying to eat( appears to be forced per night staff), said had CP at that time now resolved ,but associated with episodes of vomitting , said had continuous headache, as bad a 9/10, has been ongoing for past few days. Pt was seen by S&S but was minimally cooperative with study, seen by PT and was minimally cooperative.    Patient denied fevers, SOB, lower extremity pain.       OBJECTIVE:  Telemetry (24 Hrs):     Vitals Signs (24 Hrs):  T(C): 36.8 (04-16-18 @ 05:45), Max: 36.8 (04-16-18 @ 05:45)  HR: 64 (04-16-18 @ 05:45) (64 - 75)  BP: 90/52 (04-16-18 @ 05:45) (90/52 - 101/68)  RR: 18 (04-16-18 @ 05:45) (18 - 18)  SpO2: 98% (04-16-18 @ 05:45) (96% - 100%)    PHYSICAL EXAM:  GENERAL: NAD, well-developed  HEAD:  Atraumatic, Normocephalic  NECK: Supple, No JVD  CHEST/LUNG: Clear to auscultation bilaterally; No wheeze  HEART: Regular rate and rhythm; No murmurs, rubs, or gallops  ABDOMEN: Soft, Nontender, Nondistended; Bowel sounds present  EXTREMITIES:  2+ Peripheral Pulses, No clubbing, cyanosis, or edema  PSYCH: flat affect, not answering questions   NEUROLOGY: facial droop markedly improved when patient evaluated for shortness of breath  SKIN: No rashes or lesions    LABS:              CAPILLARY BLOOD GLUCOSE              RADIOLOGY & ADDITIONAL TESTS:    EEG Classification / Summary:  Normal EEG Study in awake and drowsy states       MEDICATIONS:  acetaminophen   Tablet. 650 milliGRAM(s) Oral every 6 hours PRN Mild Pain (1 - 3)  acetaminophen  IVPB. 1000 milliGRAM(s) IV Intermittent once  artificial  tears Solution 1 Drop(s) Both EYES two times a day  artificial tears (preservative free) Ophthalmic Solution 1 Drop(s) Right EYE four times a day  dextrose 5% + sodium chloride 0.9%. 1000 milliLiter(s) (100 mL/Hr) IV Continuous <Continuous>  ibuprofen  Tablet 400 milliGRAM(s) Oral every 8 hours PRN moderate to severe pain  lidocaine   Patch 1 Patch Transdermal daily  lidocaine 1% (Preservative-free) Injectable 10 milliLiter(s) Local Injection once  LORazepam     Tablet 0.5 milliGRAM(s) Oral two times a day  LORazepam     Tablet 0.5 milliGRAM(s) Oral every 6 hours PRN Anxiety  LORazepam   Injectable 0.5 milliGRAM(s) IV Push every 6 hours PRN severe agitation  ondansetron   Disintegrating Tablet 4 milliGRAM(s) Oral three times a day PRN Nausea and/or Vomiting  oxyCODONE    5 mG/acetaminophen 325 mG 2 Tablet(s) Oral every 4 hours PRN Moderate Pain (4 - 6)  pantoprazole    Tablet 40 milliGRAM(s) Oral before breakfast      ALLERGIES:  No Known Allergies    CASE DISCUSSED WITH: Dr. Moody (psych) re: plan of care, concerns for conversion disorder

## 2018-04-16 NOTE — PROGRESS NOTE BEHAVIORAL HEALTH - SUMMARY
Patient is a 21 y/o male from [facility], immigrated to [place] 3.5 y/a, employed as a [employer] , single, childless, domiciled with aunt, sister and her , with no significant PMHx or PPHx, no history of psychiatric hospitalizations, and no history of suicidality, admitted with seizure-like activity. Psychiatry consulted due to concerns regarding conversion disorder.    On exam, pt endorses suicidal ideation since yesterday, as well as worsening headache and continued burning substernal chest pain, N/V, generalized fatigue and weakness, dyspnea on exertion, inability to close R eyelid, pain around right eye, L facial droop, and difficulty opening mouth.  Pt mood and affect appeared depressed and constricted.  Pt expressed active suicidal ideation and hopelessness regarding his prognosis, he continues to lack psychotic symptoms. Pt cannot leave AMA.     Plan: Patient is a 21 y/o male from [facility], immigrated to [place] 3.5 y/a, employed as a [employer] , single, childless, domiciled with aunt, sister and her , with no significant PMHx or PPHx, no history of psychiatric hospitalizations, and no history of suicidality, admitted with seizure-like activity. Psychiatry consulted due to concerns regarding conversion disorder.    On exam, pt endorses suicidal ideation yesterday when he was in severe pain, and states the SI thoughts come to him when the pain is unbearable, but denies intent/plan at this time. He continues to endorse worsening headache and continued burning substernal chest pain, N/V, generalized fatigue and weakness, dyspnea on exertion, inability to close R eyelid, pain around right eye, L facial droop, and difficulty opening mouth.  Pt mood and affect appeared depressed and constricted.  At present time pt is able to contract for safety and does not require a CO 1:1 for a psychiatric reason (danger to self/others), however would recommend enhanced supervision to monitor behavior, for e.g. possibly self-inducing vomiting.     Plan:  1) Discontinue CO 1:1, no acute danger to self/others  2) Titrate standing Ativan to 1mg po bid  3) Start standing Seroquel 50mg po qhs  4) Ativan prns for anxiety/agitation as written

## 2018-04-16 NOTE — PROGRESS NOTE BEHAVIORAL HEALTH - NSBHFUPINTERVALHXFT_PSY_A_CORE
Yesterday, pt endorsed suicidal ideation without a plan because the pain in his head and chest were so bad, placed on one-to-one by medical team, received PRN Ativan 0.5mg IV on 15 April at noon and again this morning at 0954 for severe agitation.   Over weekend, pt had several unobserved episodes of vomiting despite receiving PRN Zofran.  Pt seen at bedside, awake, A&Ox4.  Pt states the burning pain in his chest and his headache get so back that he thinks about killing himself.  He denies a plan, but stated "Maybe I am already dead, maybe this life is over," because when he calls for the doctors they do not come and no one is treating him correctly, his condition is not improving, yet they keep him in the hospital - at this point pt became tearful and quiet.   Pt endorsed vomiting seven times last night, including one episode in which he saw blood in the vomit, nursing and one-to-one were not able to confirm these statements.  Pt says he is not unable to swallow or keep down water, though RN says he was able to take pills PO this morning.  Pt was able to walk to the door of his room yesterday before the exertion was overwhelming.  Pt says he continues to become quickly short of breath with conversation or exertion, have intermittent burning chest pain and diffuse headache rated 9/10, and now is also endorsing constipation for 11+ days, inability to look up for a few days, and burning when he urinates for a day. Yesterday, pt endorsed suicidal ideation without a plan because the pain in his head and chest were so bad, placed on one-to-one by medical team, received PRN Ativan 0.5mg IV on 15 April at noon and again this morning at 0954 for severe agitation.   Over weekend, pt had several unobserved episodes of vomiting despite receiving PRN Zofran.  Pt seen at bedside, awake, A&Ox4.  Pt states the burning pain in his chest and his headache get so back that he thinks about killing himself.  He denies a plan, but stated "Maybe I am already dead, maybe this life is over," because when he calls for the doctors he states they do not come and no one is treating him correctly, his condition is not improving, yet they keep him in the hospital - at this point pt became tearful and quiet.   Pt endorsed vomiting seven times last night, including one episode in which he saw blood in the vomit, nursing and one-to-one were not able to confirm these statements.  The PCA sitting with pt today states that she witnessed him spit up, but that it was not actual vomit. Pt says he is not unable to swallow or keep down water, though RN says he was able to take pills PO this morning.  Pt was able to walk to the door of his room yesterday before the exertion was overwhelming.  Pt says he continues to become quickly short of breath with conversation or exertion, have intermittent burning chest pain and diffuse headache rated 9/10, and now is also endorsing constipation for 11+ days, inability to look up for a few days, and burning when he urinates for a day.     Pt asked again if he is feeling suicidal at this time, he states he is not now, but when the pain gets very bad he feels hopeless and feel like he wants to harm himself. Pt denies any plan or intent to harm himself at this time.

## 2018-04-16 NOTE — PROGRESS NOTE ADULT - ASSESSMENT
22M with no PMHx who presents to the ED with L-sided facial droop possibly 2/2 Bell's Palsy vs conversion disorder vs auotimmune neuro disorder?, also with recent seizure-like activity concerning for epilepsy vs psychogenic non epileptic seizure, s/p 7 day tx for [place] Palsy, with normal MRI now s/p LP yesterday 4/11, with chest pain and SOB.

## 2018-04-16 NOTE — PROGRESS NOTE ADULT - PROBLEM SELECTOR PLAN 1
- says when he has the headache he wants to kill himself, no plan, psych consulted x3 with no call back despite writing SI in page, started constant obs, gave percocet for pain control which improved pain  - would not be able to leave AMA given SI, would need stat psych consult overnight. - says when he has the headache he wants to kill himself, no plan, psych consulted started constant obs, gave percocet for pain control which improved pain  - would not be able to leave AMA given SI, would need stat psych consult overnight. - says when he has the headache he wants to kill himself, no plan, psych consulted started constant obs, - d/c narcotics - c/w ibuprofen, tylenol, lidocaine patch, maalox, PPI  - Pt has no clear organic etiology for N/V, difficulty with swallowing, chest pain - patient concerns are inconsistent with physical findings, patient complains of weakness but seen to ambulate, complains of dysphagia but seen eating - VSS during episodes of pain  - appreciate psych reccs  - would not be able to leave AMA given SI, would need stat psych consult overnight.

## 2018-04-16 NOTE — PROGRESS NOTE BEHAVIORAL HEALTH - DETAILS
Generalized fatigue right eye unable to close eyelid; pain at lateral aspect of R eye left lip numbness, difficulty chewing shortness of breath after short conversation/minimal exhertion N/V associated with eating, intermittent burning pain radiating from epigastrium to neck, constipation Dysuria generalized weakness seizure activity; inability to look up on vertical gaze bilaterally

## 2018-04-16 NOTE — PROGRESS NOTE BEHAVIORAL HEALTH - PRN MEDS
Ativan 0.5mg IV PRN on 15 April at 12pm and 16 April at 10am for severe agitation
Tramadol 25mg at 4/9 pm and 4/10 am for pain in R neck and headache

## 2018-04-16 NOTE — PROGRESS NOTE ADULT - ATTENDING COMMENTS
Patient seen and examined with psychiatry attending today. Patient continues to report neck pain, abdominal pain, burning chest pain. Patient was non-cooperative with speech and swallow exam. He reports that he vomited this AM and is requesting IV medications, but per discussion with 1:1 patient only spit up slightly, but without emesis. He did not eat today. I had a lengthy conversation with the patient today regarding his plan of care and symptoms. I explained to him that I would not be able to help him with his pain and other symptoms, if he continued to be non-cooperative with the work-up. I explained that I could not treat anything without results of testing. As such, we made an agreement that patient would participate in speech and swallow evaluation again.     I also explained to him that he would no longer be receiving medications via the IV. I explained that IV pain medications, IV H2 blockers/PPI were not a long term solution and that we would not be offering them as such. I explained that the Zofran ODT medication can be dissolved in the mouth and that he should try this prior to eating. I reiterated the importance of complying with medical work-up as I would not continue to treat him generically without further testing.     EEG is negative. I truly believe that patient's symptoms are non-organic is nature and psychogenic in origin. Whether it is conversion disorder vs. somatic d/o, I am not sure at this time. Will f/u with psychiatry regarding disposition ([facility] vs other). Will increase Ativan to 1mg po BID with Seroquel 50mg po QHS. Patient reports he is not taking meds, but did take them per nursing report. Will do calorie count.

## 2018-04-17 DIAGNOSIS — F44.9 DISSOCIATIVE AND CONVERSION DISORDER, UNSPECIFIED: ICD-10-CM

## 2018-04-17 DIAGNOSIS — F44.5 CONVERSION DISORDER WITH SEIZURES OR CONVULSIONS: ICD-10-CM

## 2018-04-17 DIAGNOSIS — R53.1 WEAKNESS: ICD-10-CM

## 2018-04-17 LAB
ACHR BIND AB SER-SCNC: 0 — SIGNIFICANT CHANGE UP
B BURGDOR AB CSF-ACNC: SIGNIFICANT CHANGE UP
BACTERIA CSF CULT: SIGNIFICANT CHANGE UP
BUN SERPL-MCNC: 20 MG/DL — SIGNIFICANT CHANGE UP (ref 7–23)
CALCIUM SERPL-MCNC: 9.1 MG/DL — SIGNIFICANT CHANGE UP (ref 8.4–10.5)
CHLORIDE SERPL-SCNC: 98 MMOL/L — SIGNIFICANT CHANGE UP (ref 98–107)
CO2 SERPL-SCNC: 21 MMOL/L — LOW (ref 22–31)
CREAT SERPL-MCNC: 1.02 MG/DL — SIGNIFICANT CHANGE UP (ref 0.5–1.3)
GLUCOSE SERPL-MCNC: 65 MG/DL — LOW (ref 70–99)
MAGNESIUM SERPL-MCNC: 2 MG/DL — SIGNIFICANT CHANGE UP (ref 1.6–2.6)
PHOSPHATE SERPL-MCNC: 2.6 MG/DL — SIGNIFICANT CHANGE UP (ref 2.5–4.5)
POTASSIUM SERPL-MCNC: 3.7 MMOL/L — SIGNIFICANT CHANGE UP (ref 3.5–5.3)
POTASSIUM SERPL-SCNC: 3.7 MMOL/L — SIGNIFICANT CHANGE UP (ref 3.5–5.3)
SODIUM SERPL-SCNC: 137 MMOL/L — SIGNIFICANT CHANGE UP (ref 135–145)
SPECIMEN SOURCE: SIGNIFICANT CHANGE UP

## 2018-04-17 PROCEDURE — 99233 SBSQ HOSP IP/OBS HIGH 50: CPT | Mod: GC

## 2018-04-17 PROCEDURE — 99233 SBSQ HOSP IP/OBS HIGH 50: CPT

## 2018-04-17 RX ORDER — DEXTROSE 50 % IN WATER 50 %
25 SYRINGE (ML) INTRAVENOUS ONCE
Qty: 0 | Refills: 0 | Status: COMPLETED | OUTPATIENT
Start: 2018-04-17 | End: 2018-04-17

## 2018-04-17 RX ORDER — ACETAMINOPHEN 500 MG
650 TABLET ORAL ONCE
Qty: 0 | Refills: 0 | Status: COMPLETED | OUTPATIENT
Start: 2018-04-17 | End: 2018-04-17

## 2018-04-17 RX ORDER — SODIUM CHLORIDE 9 MG/ML
1000 INJECTION, SOLUTION INTRAVENOUS
Qty: 0 | Refills: 0 | Status: DISCONTINUED | OUTPATIENT
Start: 2018-04-17 | End: 2018-04-17

## 2018-04-17 RX ADMIN — Medication 400 MILLIGRAM(S): at 03:29

## 2018-04-17 RX ADMIN — Medication 1 DROP(S): at 16:34

## 2018-04-17 RX ADMIN — Medication 400 MILLIGRAM(S): at 11:49

## 2018-04-17 RX ADMIN — Medication 0.5 MILLIGRAM(S): at 16:33

## 2018-04-17 RX ADMIN — ONDANSETRON 4 MILLIGRAM(S): 8 TABLET, FILM COATED ORAL at 23:02

## 2018-04-17 RX ADMIN — Medication 1 DROP(S): at 12:43

## 2018-04-17 RX ADMIN — ONDANSETRON 4 MILLIGRAM(S): 8 TABLET, FILM COATED ORAL at 08:26

## 2018-04-17 RX ADMIN — SODIUM CHLORIDE 75 MILLILITER(S): 9 INJECTION, SOLUTION INTRAVENOUS at 14:37

## 2018-04-17 RX ADMIN — Medication 400 MILLIGRAM(S): at 10:49

## 2018-04-17 RX ADMIN — Medication 650 MILLIGRAM(S): at 14:45

## 2018-04-17 RX ADMIN — Medication 650 MILLIGRAM(S): at 15:30

## 2018-04-17 RX ADMIN — Medication 25 MILLILITER(S): at 08:56

## 2018-04-17 RX ADMIN — Medication 0.5 MILLIGRAM(S): at 06:27

## 2018-04-17 RX ADMIN — Medication 1 DROP(S): at 06:15

## 2018-04-17 RX ADMIN — QUETIAPINE FUMARATE 50 MILLIGRAM(S): 200 TABLET, FILM COATED ORAL at 22:43

## 2018-04-17 RX ADMIN — Medication 400 MILLIGRAM(S): at 04:30

## 2018-04-17 RX ADMIN — PANTOPRAZOLE SODIUM 40 MILLIGRAM(S): 20 TABLET, DELAYED RELEASE ORAL at 07:34

## 2018-04-17 RX ADMIN — LIDOCAINE 1 PATCH: 4 CREAM TOPICAL at 15:43

## 2018-04-17 RX ADMIN — FAMOTIDINE 20 MILLIGRAM(S): 10 INJECTION INTRAVENOUS at 06:15

## 2018-04-17 NOTE — PROGRESS NOTE BEHAVIORAL HEALTH - SUMMARY
Patient is a 23 y/o male from [facility], immigrated to [place] 3.5 y/a, employed as a [employer] , single, childless, domiciled with aunt, sister and her , with no significant PMHx or PPHx, no history of psychiatric hospitalizations, and no history of suicidality, admitted with seizure-like activity. Psychiatry consulted due to concerns regarding conversion disorder.    On exam, pt denied continued suicidal ideation, but stated he wanted to leave the hospital bc his pain was not well controlled in the hospital.  He continues to endorse severe intermittent pain inferior to R ear/posterior to R mandibular ramus, as well as continued burning substernal chest pain, N/V, generalized fatigue and weakness, dyspnea on exertion, inability to close R eyelid, pain around right eye, L facial droop, and difficulty opening mouth and swallowing.  Pt mood appears depressed and irritable and his affect constricted.  At present time pt is able to contract for safety and does not require a CO 1:1 for a psychiatric reason (danger to self/others), however would recommend enhanced supervision to monitor behavior, for e.g. possibly self-inducing vomiting.     Plan:  1) Continue standing Ativan 1mg po bid  3) Continue standing Seroquel 50mg po qhs  4) Ativan prns for anxiety/agitation as written  5) If pt discharged, send home w/ two weeks supply standing Ativan and Seroquel Patient is a 23 y/o male from [facility], immigrated to [place] 3.5 y/a, employed as a [employer] , single, childless, domiciled with aunt, sister and her , with no significant PMHx or PPHx, no history of psychiatric hospitalizations, and no history of suicidality, admitted with seizure-like activity. Psychiatry consulted due to concerns regarding conversion disorder.    On exam, pt denied continued suicidal ideation, but stated he wanted to leave the hospital bc his pain was not well controlled in the hospital.  He continues to endorse severe intermittent pain inferior to R ear/posterior to R mandibular ramus, as well as continued burning substernal chest pain, N/V, generalized fatigue and weakness, dyspnea on exertion, inability to close R eyelid, pain around right eye, L facial droop, and difficulty opening mouth and swallowing.  Pt mood appears depressed and irritable and his affect constricted.  At present time pt is able to contract for safety and does not require a CO 1:1 for a psychiatric reason (danger to self/others), however would recommend enhanced supervision to monitor behavior, for e.g. possibly self-inducing vomiting.     Plan:  1) Continue standing Ativan 1mg po bid  2) Continue standing Seroquel 50mg po qhs  3) Ativan prns for anxiety/agitation as written  4) Pt has capacity to leave AMA if he chooses to  5) If pt discharged, send home w/ two weeks supply of both standing Ativan and Seroquel; will give pt/family outpatient psych referrals for therapy

## 2018-04-17 NOTE — SWALLOW BEDSIDE ASSESSMENT ADULT - SWALLOW EVAL: CURRENT DIET
Regular with Thin Liquids
Regular Solids with Thin Liquids
regular/solid diet with thin liquids, per MD order
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
## 2018-04-17 NOTE — PROGRESS NOTE BEHAVIORAL HEALTH - NSBHFUPINTERVALHXFT_PSY_A_CORE
No acute overnight events, no PRN psychiatric medications.  Pt seen at bedside, sitting up, A&Ox3.  Pt denies continued suicidal ideation, states that he wants to leave the hospital because his pain is not being treated correctly and he thinks he will feel better at home.  Pt says that when he gets pain medication, it takes affect after about 10min, but then a few minutes later the pain is back as bad as before.  He is frustrated that the hospital cannot control his pain, which he described as a stabbing pain inferior to the R ear, deep to the posterior aspect of the R mandibular ramus.  The pain is intermittent for a few hours at time and resolves spontaneously, it worsens with chewing or talking, and nothing in particular makes it better.  Pt says his lower lip and jaw deviation to the left began first, and that the pain started later but he could not specify a timeline.  He continues to have episodes of vomiting when he attempts to eat or drink; however, he is able to keep down his oral medications.  Pt endorses difficulty swallowing, but continues to take his oral medications without difficulty.      Pt denies suicidal ideation, intent, or plan.  Pt says if he does not feel better at home, then he plans to go to a different hospital to seek care, though he does not know which one.      Pt's brother-in-law present for some of the interview, states that pt's uncle is coming to discuss the possibility of going home with pt.  Discussed with brother-in-law the possibility that underlying stress could be contributing to pt's symptoms; however, brother-in-law reiterated that pt had a happy family, no financial stressors, and was happy prior to the onset of symptoms two weeks ago.

## 2018-04-17 NOTE — PROGRESS NOTE ADULT - PROBLEM SELECTOR PLAN 2
- presents with  4-day history of sudden-onset L facial droop a/w R-facial numbness, inability to close R-eye, and severe headache- thought to be 2/2 to bells palsy per outside hospital evaluation  - waxing and waning facial droop, patient able to move mouth to right side; droop disappearing when patient speaking   - MRI of head negative and neurological exam does not correlate with physiologic pathology  - s/p course of steroids and valtrex for bells palsy w/o improvement  - ddx includes conversion disorder vs autoimmune process vs infection  - s/p LP on 4/11- cell count normal, gram stain negative, culture neg, PCR neg - presents with  4-day history of sudden-onset L facial droop a/w R-facial numbness, inability to close R-eye, and severe headache- thought to be 2/2 to bells palsy per outside hospital evaluation  - waxing and waning facial droop, patient able to move mouth to right side; droop disappearing when patient speaking   - MRI of head negative and neurological exam does not correlate with physiologic pathology/ now complain of right sided jaw pain - patient various complaints are inconsistent and no clear source of pathology  - s/p course of steroids and valtrex for bells palsy w/o improvement  - ddx includes conversion disorder vs autoimmune process vs infection  - s/p LP on 4/11- cell count normal, gram stain negative, culture neg, PCR neg -Patient with no po intake yesterday and minimal PO intake today  -I walked patient and he requires assistance, but PT recs home with home PT  -As patient is not eating and is hypoglycemic, will start IVF, but c/w oral medications as he can tolerate pills

## 2018-04-17 NOTE — PROGRESS NOTE ADULT - ATTENDING COMMENTS
Patient seen and examined at bedside. Case d/w psych, patient, and family. I still feel patient's symptoms are not organic in nature and likely psychiatric: either somatic d/o vs. conversion d/o. Psych and I spoke with patient and family to report that patient would get better but that it would be a slow process and require close outpatient psychiatric follow-up. However, patient and family do not seem to grasp this. Regardless, patient is not eating, and now hypoglycemic, will give IV hydration. C/w oral meds as patient can tolerate this. Will c/s GI to determine if any further inpatient w/u is indicated. C/w Ativan and Seroquel. Patient has capacity to leave AMA if desired.

## 2018-04-17 NOTE — PROGRESS NOTE BEHAVIORAL HEALTH - NSBHCONSULTFOLLOWAFTERCARE_PSY_A_CORE FT
Follow-up with outpatient psychiatric therapy   Continue with new psychiatric medications Ativan, Seroquel Follow-up with outpatient psychotherapy  Continue with new psychiatric medications Ativan, Seroquel

## 2018-04-17 NOTE — SWALLOW BEDSIDE ASSESSMENT ADULT - SWALLOW EVAL: RECOMMENDED DIET
Defer to MD for nutrition/hydration/medication plan
Regular Solids and Thin Liquids pending GI clearance

## 2018-04-17 NOTE — CONSULT NOTE ADULT - ASSESSMENT
Impression:    1. Possible dysphagia/Nausea: difficult to elicit the patient symptomatology on interview as preoccupied with neck pain. DDx for dysphagia includes EoE, esophagitis dysmotility. Nausea could be drug induced, rule out bowel obstruction.    Recommendation:  -it may be possible to rule out bowel obstruction and esophageal pathology with an esophagram+upper GI series. It would be difficult and inappropriate to perform EGD when patient with such severe neck pain at this time.   -agree w supportive measures, antacids, antiemetics, IV fluids Impression:    1. Possible dysphagia/Nausea: difficult to elicit the patient symptomatology on interview as preoccupied with neck pain. DDx for dysphagia includes EoE, esophagitis, dysmotility. Nausea could be drug induced, rule out bowel obstruction.    Recommendation:  -it may be possible to rule out bowel obstruction and esophageal pathology with an esophagram+upper GI series. It would be difficult and inappropriate to perform EGD when patient with such severe neck pain at this time.   -agree w supportive measures, acid supression, antiemetics, IV fluids  -consider holding NSAIDs Impression:    1. Possible dysphagia/Nausea: difficult to elicit the patient symptomatology on interview as preoccupied with neck pain. DDx for dysphagia includes EoE, esophagitis, dysmotility. Nausea could be drug induced, rule out bowel obstruction. LFT normal.    Recommendation:  -it may be possible to rule out bowel obstruction and esophageal pathology with an esophagram+upper GI series. It would be difficult and inappropriate to perform EGD when patient with such severe neck pain at this time.   -agree w supportive measures, acid supression, antiemetics, IV fluids  -consider holding NSAIDs  -add on lipase Impression:    1. Possible dysphagia/Nausea: difficult to elicit the patient symptomatology on interview as preoccupied with neck pain. DDx for dysphagia includes EoE, esophagitis, dysmotility. Nausea could be drug induced, rule out bowel obstruction. LFT normal.    Recommendation:  -obtain an XR esopahgram   -agree w supportive measures, acid supression, antiemetics, IV fluids  -consider holding NSAIDs  -add on lipase Impression:    1. Possible dysphagia/Nausea: difficult to elicit the patient symptomatology on interview as preoccupied with neck pain. DDx for dysphagia includes EoE, esophagitis, dysmotility, masses. Nausea could be drug induced, rule out bowel obstruction. LFT normal.    Recommendation:  -obtain an XR esopahgram   -agree w supportive measures, acid supression, antiemetics, IV fluids  -consider holding NSAIDs  -add on lipase

## 2018-04-17 NOTE — SWALLOW BEDSIDE ASSESSMENT ADULT - SWALLOW EVAL: DIAGNOSIS
Unable to perform an adequate assessment of oral/pharyngeal swallow function at this time as patient only accepted limited trials of puree and thin liquid x1 each, after which patient immediately stated "I'm nauseas" and began to spit up clear mucous. No overt distress noted. RN at bedside.
The patient demonstrates grossly intact oral and pharyngeal stages of swallow function for limited trials of puree, regular solid and thin liquid characterized by adequate mastication of solids, functional bolus collection and transfer, timely swallow trigger with palpable hyolaryngeal excursion, and no immediate overt, clinical s/s of laryngeal penetration/aspiration noted across trials. However, patient with c/o nausea followed by observed vomitus post trials.

## 2018-04-17 NOTE — PROGRESS NOTE ADULT - PROBLEM SELECTOR PLAN 3
- patient with shortness of breath and chest pain overnight and during day  - no indication of pathology- chest examination clear, EKG normal sinus, and O2 saturations 100%  - chest xray WNL w/ clear lungs  - shortness of breath may be 2/2 to anxiety-   - c/w ativan per psych recommendations - Pt few days ago stated he has the headache he wants to kill himself, no plan, psych reccs appreciated enhanced supervision, - c/w ibuprofen, tylenol, lidocaine patch, maalox, PPI  - Pt has no clear organic etiology for N/V, difficulty with swallowing, chest pain/ now jaw pain - patient concerns are inconsistent with physical findings, patient complains of weakness but seen to ambulate, complains of dysphagia but seen eating - VSS during episodes of pain - patient does have episodes of emesis  - appreciate psych reccs  - no longer concern for SI during recent questioning - has capacity to leave A

## 2018-04-17 NOTE — SWALLOW BEDSIDE ASSESSMENT ADULT - ASR SWALLOW ASPIRATION MONITOR
upper respiratory infection/fever/pneumonia/change of breathing pattern/position upright (90Y)/cough/gurgly voice/oral hygiene/throat clearing
throat clearing/upper respiratory infection/change of breathing pattern/cough/gurgly voice/position upright (90Y)/fever/oral hygiene/pneumonia

## 2018-04-17 NOTE — PROGRESS NOTE ADULT - ASSESSMENT
22M with no PMHx who presents to the ED with L-sided facial droop possibly 2/2 Bell's Palsy vs conversion disorder vs auotimmune neuro disorder?, also with recent seizure-like activity concerning for epilepsy vs psychogenic non epileptic seizure, s/p 7 day tx for [place] Palsy, with normal MRI now s/p LP yesterday 4/11, with chest pain and SOB. 22M with no PMHx who presents to the ED with L-sided facial droop possibly 2/2 Bell's Palsy vs conversion disorder, also with recent seizure-like activity concerning for epilepsy vs psychogenic non epileptic seizure, s/p 7 day tx for [place] Palsy, with normal MRI now s/p LP, with chest pain and SOB. All symptoms are likely in the setting of conversion disorder and unlikely organic in nature.

## 2018-04-17 NOTE — SWALLOW BEDSIDE ASSESSMENT ADULT - SWALLOW EVAL: CRITERIA FOR SKILLED INTERVENTION MET
not appropriate for swallowing intervention/MD to re-consult pending resolved nausea and patient compliance
not appropriate for swallowing intervention

## 2018-04-17 NOTE — PROGRESS NOTE ADULT - PROBLEM SELECTOR PLAN 7
- DVT PPx:  IMPROVE score= 1, no pharm  dvt ppx  - regular diet  - PT 4/12 dc home w/ outpatient therapy - Patient reported to have 3 episodes of whole-body shaking - no episodes in the hospital  - EEG w/o epileptic activity. 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

## 2018-04-17 NOTE — CONSULT NOTE ADULT - ATTENDING COMMENTS
Seen and examined on rounds with housestaff.  Patient reports a bilateral facial weakness, L worse than right as well as 3 periods of LOC and convulsions.  Will start by checking an MRI brain to r.o an intracranial process and will perform a VEEG to further workup symptoms of loss of consciousness.  Can send off blood work for concern of multiple cranial nerve abnormalities.  This will include Lyme, Ace, HIV, anti-acetylcholinesterase Ab.
Pt seen and examined. Agree with fellow's note. Plan discussed with patient and primary team.     Patient had chief complaint of dysphagia    Will follow up on esophagram results. Recommendation for EGD will depend on esophagram results    Can consider CTAP if nausea persists.    Symptomatic control of nausea

## 2018-04-17 NOTE — PROGRESS NOTE ADULT - PROBLEM SELECTOR PLAN 5
- Patient reported to have 3 episodes of whole-body shaking - no episodes in the hospital  - unknown if seizure- no elevated CK on admission  - will obtain awake/asleep EEG per psych  - possible pseudoseizure - Patient reported to have 3 episodes of whole-body shaking - no episodes in the hospital  - unknown if seizure- no elevated CK on admission  - EEG w/o epileptic activity  - possible pseudoseizure - patient with shortness of breath and chest pain overnight and during day  - no indication of pathology- chest examination clear, EKG normal sinus, and O2 saturations 100%  - chest xray WNL w/ clear lungs  - shortness of breath may be 2/2 to anxiety-   - c/w ativan per psych recommendations

## 2018-04-17 NOTE — SWALLOW BEDSIDE ASSESSMENT ADULT - SWALLOW EVAL: RECOMMENDED FEEDING/EATING TECHNIQUES
maintain upright posture during/after eating for 30 mins/allow for swallow between intakes/position upright (90 degrees)/small sips/bites

## 2018-04-17 NOTE — PROGRESS NOTE ADULT - SUBJECTIVE AND OBJECTIVE BOX
CONTACT INFO:  Amara Marley MD  Internal Medicine PGY1  Pager:  [phone]/ [phone]    M-F 7am-7pm:  pager covered by day team     *Academic conferences M-F 8am-9am & 12pm-1pm - page ONLY if urgent or if consultant  M-F & Sa-Sun 7pm-7am:  NS  page 1443 for Teams 1-3, 1446 for Team 4 & Care Model/ [facility] Page 13913 for T 1-3 and 54944 for T4 &CM  Sa-Sun 7am-12pm:  see patient chart, primary physician assigned available 7am-12pm  Sa-Sun 12pm-7pm:  NS  page 1443 for Teams 1-4, [facility]  page Covering Resident    BRITNEY GARCIA  22y  Male    Patient is a 22y old  Male who presents with a chief complaint of Facial droop, Seizure-like activity (14 Apr 2018 12:20)      INTERVAL HPI / OVERNIGHT EVENTS:      OBJECTIVE:  Telemetry (24 Hrs):     Vitals Signs (24 Hrs):  T(C): 36.4 (04-17-18 @ 05:37), Max: 36.9 (04-16-18 @ 22:27)  HR: 73 (04-17-18 @ 06:20) (60 - 89)  BP: 110/73 (04-17-18 @ 06:20) (95/56 - 110/73)  RR: 18 (04-17-18 @ 05:37) (18 - 18)  SpO2: 99% (04-17-18 @ 05:37) (96% - 100%)    PHYSICAL EXAM:  General: Comfortable, no apparent distress  HEENT: Atraumatic; EOMI, PERRLA, conjunctiva and sclera clear; no tonsillar erythema/exudates/enlargement  Neck: Supple; no JVD; thyroid normal without masses or enlargement  Chest/Lungs: Clear to auscultation B/L; no rales, rhonchi or wheezing  Heart: Regular rate and rhythm; normal S1/S2; no murmurs, rubs, or gallops  Abdomen: Soft, nontender, nondistended; bowel sounds present  Extremities: PT/DP pulses 2+ B/L; no edema  Skin: No rashes or lesions  Neurological: Alert and oriented to person/place/time    LABS:    04-17    137  |  98  |  20  ----------------------------<  65<L>  3.7   |  21<L>  |  1.02    Ca    9.1      17 Apr 2018 05:45  Phos  2.6     04-17  Mg     2.0     04-17          CAPILLARY BLOOD GLUCOSE              RADIOLOGY & ADDITIONAL TESTS:      MEDICATIONS:  acetaminophen   Tablet. 650 milliGRAM(s) Oral every 6 hours PRN Mild Pain (1 - 3)  aluminum hydroxide/magnesium hydroxide/simethicone Suspension 30 milliLiter(s) Oral every 6 hours PRN Dyspepsia  artificial  tears Solution 1 Drop(s) Both EYES two times a day  artificial tears (preservative free) Ophthalmic Solution 1 Drop(s) Right EYE four times a day  dextrose 5% + sodium chloride 0.9%. 1000 milliLiter(s) (100 mL/Hr) IV Continuous <Continuous>  famotidine   Suspension 20 milliGRAM(s) Oral two times a day  ibuprofen  Tablet 400 milliGRAM(s) Oral every 8 hours PRN moderate to severe pain  lidocaine   Patch 1 Patch Transdermal daily  lidocaine 1% (Preservative-free) Injectable 10 milliLiter(s) Local Injection once  LORazepam     Tablet 0.5 milliGRAM(s) Oral two times a day  LORazepam     Tablet 1 milliGRAM(s) Oral every 6 hours PRN Anxiety  LORazepam   Injectable 0.5 milliGRAM(s) IV Push every 6 hours PRN severe agitation  ondansetron   Disintegrating Tablet 4 milliGRAM(s) Oral three times a day PRN Nausea and/or Vomiting  pantoprazole   Suspension 40 milliGRAM(s) Oral before breakfast  QUEtiapine 50 milliGRAM(s) Oral at bedtime      ALLERGIES:  No Known Allergies CONTACT INFO:  Amara Marley MD  Internal Medicine PGY1  Pager:  [phone]/ [phone]    M-F 7am-7pm:  pager covered by day team     *Academic conferences M-F 8am-9am & 12pm-1pm - page ONLY if urgent or if consultant  M-F & Sa-Sun 7pm-7am:  NS  page 1443 for Teams 1-3, 1446 for Team 4 & Care Model/ [facility] Page [id number] for T 1-3 and 76067 for T4 &CM  Sa-Sun 7am-12pm:  see patient chart, primary physician assigned available 7am-12pm  Sa-Sun 12pm-7pm:  NS  page 1443 for Teams 1-4, [facility]  page Covering Resident    BRITNEY GARCIA  22y  Male    Patient is a 22y old  Male who presents with a chief complaint of Facial droop, Seizure-like activity (14 Apr 2018 12:20)    22M with no PMHx who presents to the ED with L-sided facial droop possibly 2/2 Bell's Palsy vs conversion disorder vs auotimmune neuro disorder?, also with recent seizure-like activity concerning for epilepsy vs psychogenic non epileptic seizure, s/p 7 day tx for [place] Palsy, with normal MRI now s/p LP  4/11, with chest pain and right side jaw pain.    INTERVAL HPI / OVERNIGHT EVENTS: NAEON, pt. afebrile, VSS. Pt. didn't eat throughout yesterday and found to have sugar in 60s this AM. Pt. given D50 and juice. Pt. able to tolerate PO medication, but still with nausea/ emesis. Passed S&S but have episode of emesis during study. Pt. complaining of R. side jaw pain, no obvious etiology (MRI head- w/o significant finding on 4/8). Pt said he was unable to sleep, but PCA said patient slept from 12 -5am.       OBJECTIVE:  Telemetry (24 Hrs):     Vitals Signs (24 Hrs):  T(C): 36.4 (04-17-18 @ 05:37), Max: 36.9 (04-16-18 @ 22:27)  HR: 73 (04-17-18 @ 06:20) (60 - 89)  BP: 110/73 (04-17-18 @ 06:20) (95/56 - 110/73)  RR: 18 (04-17-18 @ 05:37) (18 - 18)  SpO2: 99% (04-17-18 @ 05:37) (96% - 100%)    PHYSICAL EXAM:  GENERAL: NAD, well-developed  HEAD:  Atraumatic, Normocephalic  NECK: Supple, No JVD, no lymphadenopathy no oral ulcer or lesions visible (patient poorly compliant with exam)  CHEST/LUNG: Clear to auscultation bilaterally; No wheeze  HEART: Regular rate and rhythm; No murmurs, rubs, or gallops  ABDOMEN: Soft, Nontender, Nondistended; Bowel sounds present  EXTREMITIES:  2+ Peripheral Pulses, No clubbing, cyanosis, or edema  PSYCH: flat affect, not answering questions   NEUROLOGY: facial droop same  SKIN: No rashes or lesions    LABS:    04-17    137  |  98  |  20  ----------------------------<  65<L>  3.7   |  21<L>  |  1.02    Ca    9.1      17 Apr 2018 05:45  Phos  2.6     04-17  Mg     2.0     04-17          CAPILLARY BLOOD GLUCOSE              RADIOLOGY & ADDITIONAL TESTS:      MEDICATIONS:  acetaminophen   Tablet. 650 milliGRAM(s) Oral every 6 hours PRN Mild Pain (1 - 3)  aluminum hydroxide/magnesium hydroxide/simethicone Suspension 30 milliLiter(s) Oral every 6 hours PRN Dyspepsia  artificial  tears Solution 1 Drop(s) Both EYES two times a day  artificial tears (preservative free) Ophthalmic Solution 1 Drop(s) Right EYE four times a day  dextrose 5% + sodium chloride 0.9%. 1000 milliLiter(s) (100 mL/Hr) IV Continuous <Continuous>  famotidine   Suspension 20 milliGRAM(s) Oral two times a day  ibuprofen  Tablet 400 milliGRAM(s) Oral every 8 hours PRN moderate to severe pain  lidocaine   Patch 1 Patch Transdermal daily  lidocaine 1% (Preservative-free) Injectable 10 milliLiter(s) Local Injection once  LORazepam     Tablet 0.5 milliGRAM(s) Oral two times a day  LORazepam     Tablet 1 milliGRAM(s) Oral every 6 hours PRN Anxiety  LORazepam   Injectable 0.5 milliGRAM(s) IV Push every 6 hours PRN severe agitation  ondansetron   Disintegrating Tablet 4 milliGRAM(s) Oral three times a day PRN Nausea and/or Vomiting  pantoprazole   Suspension 40 milliGRAM(s) Oral before breakfast  QUEtiapine 50 milliGRAM(s) Oral at bedtime      ALLERGIES:  No Known Allergies CONTACT INFO:  Amara Marley MD  Internal Medicine PGY1  Pager:  [phone]/ [phone]    M-F 7am-7pm:  pager covered by day team     *Academic conferences M-F 8am-9am & 12pm-1pm - page ONLY if urgent or if consultant  M-F & Sa-Sun 7pm-7am:  NS  page 1443 for Teams 1-3, 1446 for Team 4 & Care Model/ [facility] Page [id number] for T 1-3 and 73061 for T4 &CM  Sa-Sun 7am-12pm:  see patient chart, primary physician assigned available 7am-12pm  Sa-Sun 12pm-7pm:  NS  page 1443 for Teams 1-4, [facility]  page Covering Resident    BRITNEY GARCIA  22y  Male    Patient is a 22y old  Male who presents with a chief complaint of Facial droop, Seizure-like activity (14 Apr 2018 12:20)    22M with no PMHx who presents to the ED with L-sided facial droop possibly 2/2 Bell's Palsy vs conversion disorder vs auotimmune neuro disorder?, also with recent seizure-like activity concerning for epilepsy vs psychogenic non epileptic seizure, s/p 7 day tx for [place] Palsy, with normal MRI now s/p LP  4/11, with chest pain and right side jaw pain.    INTERVAL HPI / OVERNIGHT EVENTS: NAEON, pt. afebrile, VSS. Pt. didn't eat throughout yesterday and found to have sugar in 60s this AM. Pt. given D50 and juice. Pt. able to tolerate PO medication, but still with nausea/ emesis. Passed S&S but have episode of emesis during study. Pt. complaining of R. side jaw pain, no obvious etiology (MRI head- w/o significant finding on 4/8). Pt said he was unable to sleep, but PCA said patient slept from 12 -5am.       OBJECTIVE:  Telemetry (24 Hrs):     Vitals Signs (24 Hrs):  T(C): 36.4 (04-17-18 @ 05:37), Max: 36.9 (04-16-18 @ 22:27)  HR: 73 (04-17-18 @ 06:20) (60 - 89)  BP: 110/73 (04-17-18 @ 06:20) (95/56 - 110/73)  RR: 18 (04-17-18 @ 05:37) (18 - 18)  SpO2: 99% (04-17-18 @ 05:37) (96% - 100%)    PHYSICAL EXAM:  GENERAL: NAD, well-developed  HEAD:  Atraumatic, Normocephalic  NECK: Supple, No JVD, no lymphadenopathy no oral ulcer or lesions visible (patient poorly compliant with exam)  CHEST/LUNG: Clear to auscultation bilaterally; No wheeze  HEART: Regular rate and rhythm; No murmurs, rubs, or gallops  ABDOMEN: Soft, Nontender, Nondistended; Bowel sounds present  EXTREMITIES:  2+ Peripheral Pulses, No clubbing, cyanosis, or edema  PSYCH: flat affect, not answering questions   NEUROLOGY: facial droop same  SKIN: No rashes or lesions    LABS:    04-17    137  |  98  |  20  ----------------------------<  65<L>  3.7   |  21<L>  |  1.02    Ca    9.1      17 Apr 2018 05:45  Phos  2.6     04-17  Mg     2.0     04-17          CAPILLARY BLOOD GLUCOSE    RADIOLOGY & ADDITIONAL TESTS:      MEDICATIONS:  acetaminophen   Tablet. 650 milliGRAM(s) Oral every 6 hours PRN Mild Pain (1 - 3)  aluminum hydroxide/magnesium hydroxide/simethicone Suspension 30 milliLiter(s) Oral every 6 hours PRN Dyspepsia  artificial  tears Solution 1 Drop(s) Both EYES two times a day  artificial tears (preservative free) Ophthalmic Solution 1 Drop(s) Right EYE four times a day  dextrose 5% + sodium chloride 0.9%. 1000 milliLiter(s) (100 mL/Hr) IV Continuous <Continuous>  famotidine   Suspension 20 milliGRAM(s) Oral two times a day  ibuprofen  Tablet 400 milliGRAM(s) Oral every 8 hours PRN moderate to severe pain  lidocaine   Patch 1 Patch Transdermal daily  lidocaine 1% (Preservative-free) Injectable 10 milliLiter(s) Local Injection once  LORazepam     Tablet 0.5 milliGRAM(s) Oral two times a day  LORazepam     Tablet 1 milliGRAM(s) Oral every 6 hours PRN Anxiety  LORazepam   Injectable 0.5 milliGRAM(s) IV Push every 6 hours PRN severe agitation  ondansetron   Disintegrating Tablet 4 milliGRAM(s) Oral three times a day PRN Nausea and/or Vomiting  pantoprazole   Suspension 40 milliGRAM(s) Oral before breakfast  QUEtiapine 50 milliGRAM(s) Oral at bedtime      ALLERGIES:  No Known Allergies    CASE DISCUSSED WITH: Psych (Dr. Tucker) re: capacity, plan of care - has capacity to leave AMA

## 2018-04-17 NOTE — PROGRESS NOTE BEHAVIORAL HEALTH - DETAILS
Generalized fatigue right eye unable to close eyelid left lip numbness, difficulty chewing and swallowing shortness of breath after short conversation/minimal exertion N/V associated with eating, intermittent burning pain radiating from epigastrium to neck, constipation Generalized weakness seizure activity; inability to look up on vertical gaze bilaterally

## 2018-04-17 NOTE — PROGRESS NOTE ADULT - PROBLEM SELECTOR PLAN 6
- patient noted to have a flat affect during the encounter  - answers questions slowly, and unwilling to participate today   - physical exam findings do not correlate with organic physiology  - psych input appreciated- .5 ativan BID standing and .5IV PRN q6 for agitation/ anxiety - patient noted to have a flat affect during the encounter  - answers questions slowly, and unwilling to participate today   - physical exam findings do not correlate with organic physiology  - psych input appreciated- .5 ativan BID standing and .5IV PRN q6 for agitation/ anxiety c/w Seroquel 50mg qhs - reported dysphagia to solids and liquids (except for water)  - patient had 2 episodes of vomitus today  - oropharynx exam WNL  - S&S showed no issues with mechanics for swallowing but pt had episode of emesis  - c/w IVF for now - will consider GI consult for dysphagia   - c/w zofran PRN for nausea and protonix

## 2018-04-17 NOTE — PROGRESS NOTE ADULT - PROBLEM SELECTOR PLAN 4
- reported dysphagia to solids and liquids (except for water)  - patient had 2 episodes of vomitus today  - oropharynx exam WNL  - patient refusing speech and swallow eval  - c/w IVF for now and retry S/S tomorrow  - c/w zofran PRN for nausea and protonix - reported dysphagia to solids and liquids (except for water)  - patient had 2 episodes of vomitus today  - oropharynx exam WNL  - S&S showed no issues with mechanics for swallowing but pt had episode of emesis  - c/w IVF for now - will consider GI consult for dysphagia   - c/w zofran PRN for nausea and protonix - presents with  4-day history of sudden-onset L facial droop a/w R-facial numbness, inability to close R-eye, and severe headache- thought to be 2/2 to bells palsy per outside hospital evaluation but has also been seen as manifestation of conversion disorder  - waxing and waning facial droop, patient able to move mouth to right side; droop disappearing when patient speaking   - MRI of head negative and neurological exam does not correlate with physiologic pathology/ now complain of right sided jaw pain - patient various complaints are inconsistent and no clear source of pathology  - s/p course of steroids and valtrex for bells palsy w/o improvement  - ddx includes conversion disorder vs autoimmune process vs infection  - s/p LP on 4/11- cell count normal, gram stain negative, culture neg, PCR neg
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
## 2018-04-17 NOTE — SWALLOW BEDSIDE ASSESSMENT ADULT - COMMENTS
This department was reconsulted as patient reportedly assures he will cooperate for reassessment of swallow function at this time. Patient was seen for an initial bedside evaluation 4/13/18 (please see full report for details). As per discussion with RN, patient reportedly vomited after breakfast this AM. Patient was received awake, alert, and sitting upright on edge of bed this PM; he was minimally verbal and inconsistently responded to low-level directives associated suspected behavioral component. Patient's brother was present at bedside and denied patient h/o dysphagia.

## 2018-04-17 NOTE — PROGRESS NOTE BEHAVIORAL HEALTH - RISK ASSESSMENT
Mild risk: Risk factors are male gender and recent medical diagnosis, protective factors include having a supportive family with whom he resides, employment, no prior psych hx, and denying any current plan to harm himself.

## 2018-04-18 LAB
BUN SERPL-MCNC: 15 MG/DL — SIGNIFICANT CHANGE UP (ref 7–23)
CALCIUM SERPL-MCNC: 9 MG/DL — SIGNIFICANT CHANGE UP (ref 8.4–10.5)
CHLORIDE SERPL-SCNC: 99 MMOL/L — SIGNIFICANT CHANGE UP (ref 98–107)
CO2 SERPL-SCNC: 26 MMOL/L — SIGNIFICANT CHANGE UP (ref 22–31)
CREAT SERPL-MCNC: 1 MG/DL — SIGNIFICANT CHANGE UP (ref 0.5–1.3)
GLUCOSE SERPL-MCNC: 77 MG/DL — SIGNIFICANT CHANGE UP (ref 70–99)
LIDOCAIN IGE QN: 67.5 U/L — HIGH (ref 7–60)
MAGNESIUM SERPL-MCNC: 1.9 MG/DL — SIGNIFICANT CHANGE UP (ref 1.6–2.6)
PHOSPHATE SERPL-MCNC: 2.5 MG/DL — SIGNIFICANT CHANGE UP (ref 2.5–4.5)
POTASSIUM SERPL-MCNC: 3.5 MMOL/L — SIGNIFICANT CHANGE UP (ref 3.5–5.3)
POTASSIUM SERPL-SCNC: 3.5 MMOL/L — SIGNIFICANT CHANGE UP (ref 3.5–5.3)
SODIUM SERPL-SCNC: 139 MMOL/L — SIGNIFICANT CHANGE UP (ref 135–145)

## 2018-04-18 PROCEDURE — 99233 SBSQ HOSP IP/OBS HIGH 50: CPT

## 2018-04-18 PROCEDURE — 99233 SBSQ HOSP IP/OBS HIGH 50: CPT | Mod: GC

## 2018-04-18 PROCEDURE — 74241: CPT | Mod: 26

## 2018-04-18 RX ORDER — SODIUM CHLORIDE 9 MG/ML
1000 INJECTION, SOLUTION INTRAVENOUS
Qty: 0 | Refills: 0 | Status: DISCONTINUED | OUTPATIENT
Start: 2018-04-19 | End: 2018-04-19

## 2018-04-18 RX ADMIN — FAMOTIDINE 20 MILLIGRAM(S): 10 INJECTION INTRAVENOUS at 06:44

## 2018-04-18 RX ADMIN — Medication 400 MILLIGRAM(S): at 20:40

## 2018-04-18 RX ADMIN — QUETIAPINE FUMARATE 50 MILLIGRAM(S): 200 TABLET, FILM COATED ORAL at 22:18

## 2018-04-18 RX ADMIN — Medication 400 MILLIGRAM(S): at 21:40

## 2018-04-18 RX ADMIN — LIDOCAINE 1 PATCH: 4 CREAM TOPICAL at 04:19

## 2018-04-18 RX ADMIN — Medication 0.5 MILLIGRAM(S): at 06:45

## 2018-04-18 RX ADMIN — Medication 1 DROP(S): at 06:44

## 2018-04-18 RX ADMIN — Medication 30 MILLILITER(S): at 04:18

## 2018-04-18 RX ADMIN — Medication 1 DROP(S): at 14:02

## 2018-04-18 RX ADMIN — PANTOPRAZOLE SODIUM 40 MILLIGRAM(S): 20 TABLET, DELAYED RELEASE ORAL at 06:45

## 2018-04-18 RX ADMIN — Medication 1 DROP(S): at 18:25

## 2018-04-18 RX ADMIN — Medication 0.5 MILLIGRAM(S): at 18:24

## 2018-04-18 RX ADMIN — FAMOTIDINE 20 MILLIGRAM(S): 10 INJECTION INTRAVENOUS at 18:25

## 2018-04-18 RX ADMIN — Medication 1 DROP(S): at 18:24

## 2018-04-18 RX ADMIN — Medication 1 DROP(S): at 00:00

## 2018-04-18 RX ADMIN — ONDANSETRON 4 MILLIGRAM(S): 8 TABLET, FILM COATED ORAL at 04:26

## 2018-04-18 RX ADMIN — Medication 1 DROP(S): at 22:20

## 2018-04-18 NOTE — PROGRESS NOTE ADULT - PROBLEM SELECTOR PLAN 4
- presents with  4-day history of sudden-onset L facial droop a/w R-facial numbness, inability to close R-eye, and severe headache- thought to be 2/2 to bells palsy per outside hospital evaluation but has also been seen as manifestation of conversion disorder  - waxing and waning facial droop, patient able to move mouth to right side; droop disappearing when patient speaking   - MRI of head negative and neurological exam does not correlate with physiologic pathology/ now complain of right sided jaw pain - patient various complaints are inconsistent and no clear source of pathology  - s/p course of steroids and valtrex for bells palsy w/o improvement  - ddx includes conversion disorder vs autoimmune process vs infection  - s/p LP on 4/11- cell count normal, gram stain negative, culture neg, PCR neg - presents with  4-day history of sudden-onset L facial droop a/w R-facial numbness, inability to close R-eye, and severe headache- thought to be 2/2 to bells palsy per outside hospital evaluation but has also been seen as manifestation of conversion disorder  - waxing and waning facial droop, patient able to move mouth to right side; droop disappearing when patient speaking or agitated  - MRI of head negative and neurological exam does not correlate with physiologic pathology/ now complain of right sided jaw pain - patient various complaints are inconsistent and no clear source of pathology  - s/p course of steroids and valtrex for bells palsy w/o improvement  - ddx includes conversion disorder vs autoimmune process vs infection  - s/p LP on 4/11- cell count normal, gram stain negative, culture neg, PCR neg

## 2018-04-18 NOTE — PROGRESS NOTE ADULT - PROBLEM SELECTOR PLAN 3
- Pt few days ago stated he has the headache he wants to kill himself, no plan, psych reccs appreciated enhanced supervision, - c/w ibuprofen, tylenol, lidocaine patch, maalox, PPI  - Pt has no clear organic etiology for N/V, difficulty with swallowing, chest pain/ now jaw pain - patient concerns are inconsistent with physical findings, patient complains of weakness but seen to ambulate, complains of dysphagia but seen eating - VSS during episodes of pain - patient does have episodes of emesis  - appreciate psych reccs  - no longer concern for SI during recent questioning - has capacity to leave A - Pt few days ago stated he has the headache he wants to kill himself, no plan, Pt. repeated that during pain he feels like hurting self or others, but no actual plan   psych reccs appreciated enhanced supervision, - c/w ibuprofen, tylenol, lidocaine patch, maalox, PPI  - Pt has no clear organic etiology for N/V, difficulty with swallowing, chest pain/ now jaw pain - patient concerns are inconsistent with physical findings, patient complains of weakness but seen to ambulate, complains of dysphagia but seen eating - VSS during episodes of pain - patient does have episodes of emesis  - appreciate psych reccs  - no longer concern for SI during recent questioning - has capacity to leave AMA

## 2018-04-18 NOTE — PROGRESS NOTE BEHAVIORAL HEALTH - NSBHFUPINTERVALHXFT_PSY_A_CORE
No acute overnight events, no PRN psychiatric medications.  Pt seen at bedside, asleep but rousable to voice and light touch, A&Ox4, resting comfortably in bed.  Pt continues to state he wants to leave [facility] and transfer to another hospital or go home as he does not feel his pain is being treated adequately here, the doctors take too long to see him when he asks for them, and there are too many patients per doctor.  However, when asked if he would stay until GI's esophagram and upper GI series test results came back, he readily agreed.  Pt's stated mood is sad because his pain is not any better.   Pt denies eating anything since yesterday, but did not appear concerned. Throughout evaluation, pt continued to ask for stronger pain medicine and state that he did not like the hospital or the service.

## 2018-04-18 NOTE — PROGRESS NOTE BEHAVIORAL HEALTH - SUMMARY
Patient is a 23 y/o male from [facility], immigrated to [place] 3.5 y/a, employed as a [employer] , single, childless, domiciled with aunt, sister and her , with no significant PMHx or PPHx, no history of psychiatric hospitalizations, and no history of suicidality, admitted with seizure-like activity. Psychiatry consulted due to concerns regarding conversion disorder.    On exam, pt continued to deny suicidal ideation and state his desire to leave the hospital bc his pain was not well controlled in the hospital.  He stated his condition had not improved, though he was resting comfortably in bed and did not rub at areas that cause him pain like he normally does.  Continues to endorse burning substernal chest pain, N/V, generalized fatigue and weakness, dyspnea on exertion, inability to close R eyelid, pain around right eye, L facial droop, and difficulty opening mouth and swallowing.  Pt mood appears depressed and irritable and his affect constricted.  At present time pt is able to contract for safety and does not require a CO 1:1 for a psychiatric reason (danger to self/others), however would recommend enhanced supervision to monitor behavior, for e.g. possibly self-inducing vomiting.     Plan:  1) Continue standing Ativan 1mg po bid  2) Continue standing Seroquel 50mg po qhs  3) Ativan prns for anxiety/agitation as written  4) Pt has capacity to leave AMA if he chooses to  5) If pt discharged, send home w/ two weeks supply of both standing Ativan and Seroquel; will give pt/family outpatient psych referrals for therapy

## 2018-04-18 NOTE — PROGRESS NOTE ADULT - PROBLEM SELECTOR PLAN 5
- patient with shortness of breath and chest pain overnight and during day  - no indication of pathology- chest examination clear, EKG normal sinus, and O2 saturations 100%  - chest xray WNL w/ clear lungs  - shortness of breath may be 2/2 to anxiety-   - c/w ativan per psych recommendations

## 2018-04-18 NOTE — PROGRESS NOTE ADULT - ATTENDING COMMENTS
Patient seen and examined at bedside. At present, he reports he is feeling "better" and that the pain is not so bad. Patient denies burning CP at this time. Upper GI series reviewed and case d/w GI, given presence of barium tab in esophagus, will need EGD. NPO after MN for EGD in AM. I explained to patient at length that if EGD was negative, he would be discharged in AM as any further w/u could be pursued as an outpatient and that I really would have no further tests to offer. He expressed understanding and was amenable to this. I further reiterated that conversion d/o was likely the cause of the patient's neck pain and that it could also lead to dysmotility along the GI tract. He still seemed to have difficulty understanding this. I explained that his symptoms would only improve if he continued to take his psychiatric medications and followed closely with a psychiatrist. Home with outpatient PT.

## 2018-04-18 NOTE — PROGRESS NOTE ADULT - PROBLEM SELECTOR PLAN 2
-Patient with no po intake yesterday and minimal PO intake today  -I walked patient and he requires assistance, but PT recs home with home PT  -As patient is not eating and is hypoglycemic, will start IVF, but c/w oral medications as he can tolerate pills

## 2018-04-18 NOTE — PROGRESS NOTE ADULT - SUBJECTIVE AND OBJECTIVE BOX
CONTACT INFO:  Amara Marley MD  Internal Medicine PGY1  Pager:  [phone]/ [phone]    M-F 7am-7pm:  pager covered by day team     *Academic conferences M-F 8am-9am & 12pm-1pm - page ONLY if urgent or if consultant  M-F & Sa-Sun 7pm-7am:  NS  page 1443 for Teams 1-3, 1446 for Team 4 & Care Model/ [facility] Page [id number] for T 1-3 and 05161 for T4 &CM  Sa-Sun 7am-12pm:  see patient chart, primary physician assigned available 7am-12pm  Sa-Sun 12pm-7pm:  NS  page 1443 for Teams 1-4, [facility]  page Covering Resident    BRITNEY GARCIA  22y  Male    Patient is a 22y old  Male who presents with a chief complaint of Facial droop, Seizure-like activity (14 Apr 2018 12:20)      INTERVAL HPI / OVERNIGHT EVENTS:      OBJECTIVE:  Telemetry (24 Hrs):     Vitals Signs (24 Hrs):  T(C): 36.7 (04-18-18 @ 06:16), Max: 36.7 (04-18-18 @ 06:16)  HR: 80 (04-18-18 @ 06:16) (66 - 89)  BP: 111/72 (04-18-18 @ 06:16) (98/60 - 119/87)  RR: 17 (04-18-18 @ 06:16) (17 - 18)  SpO2: 100% (04-18-18 @ 06:16) (99% - 100%)    PHYSICAL EXAM:  General: Comfortable, no apparent distress  HEENT: Atraumatic; EOMI, PERRLA, conjunctiva and sclera clear; no tonsillar erythema/exudates/enlargement  Neck: Supple; no JVD; thyroid normal without masses or enlargement  Chest/Lungs: Clear to auscultation B/L; no rales, rhonchi or wheezing  Heart: Regular rate and rhythm; normal S1/S2; no murmurs, rubs, or gallops  Abdomen: Soft, nontender, nondistended; bowel sounds present  Extremities: PT/DP pulses 2+ B/L; no edema  Skin: No rashes or lesions  Neurological: Alert and oriented to person/place/time    LABS:    04-17    137  |  98  |  20  ----------------------------<  65<L>  3.7   |  21<L>  |  1.02    Ca    9.1      17 Apr 2018 05:45  Phos  2.6     04-17  Mg     2.0     04-17          CAPILLARY BLOOD GLUCOSE      POCT Blood Glucose.: 78 mg/dL (18 Apr 2018 05:43)  POCT Blood Glucose.: 105 mg/dL (17 Apr 2018 23:29)  POCT Blood Glucose.: 73 mg/dL (17 Apr 2018 22:25)  POCT Blood Glucose.: 82 mg/dL (17 Apr 2018 16:54)  POCT Blood Glucose.: 146 mg/dL (17 Apr 2018 09:15)  POCT Blood Glucose.: 67 mg/dL (17 Apr 2018 08:48)  POCT Blood Glucose.: 65 mg/dL (17 Apr 2018 07:43)          RADIOLOGY & ADDITIONAL TESTS:      MEDICATIONS:  acetaminophen   Tablet. 650 milliGRAM(s) Oral every 6 hours PRN Mild Pain (1 - 3)  aluminum hydroxide/magnesium hydroxide/simethicone Suspension 30 milliLiter(s) Oral every 6 hours PRN Dyspepsia  artificial  tears Solution 1 Drop(s) Both EYES two times a day  artificial tears (preservative free) Ophthalmic Solution 1 Drop(s) Right EYE four times a day  famotidine   Suspension 20 milliGRAM(s) Oral two times a day  ibuprofen  Tablet 400 milliGRAM(s) Oral every 8 hours PRN moderate to severe pain  lidocaine   Patch 1 Patch Transdermal daily  lidocaine 1% (Preservative-free) Injectable 10 milliLiter(s) Local Injection once  LORazepam     Tablet 0.5 milliGRAM(s) Oral two times a day  LORazepam     Tablet 1 milliGRAM(s) Oral every 6 hours PRN Anxiety  LORazepam   Injectable 0.5 milliGRAM(s) IV Push every 6 hours PRN severe agitation  ondansetron   Disintegrating Tablet 4 milliGRAM(s) Oral three times a day PRN Nausea and/or Vomiting  pantoprazole   Suspension 40 milliGRAM(s) Oral before breakfast  QUEtiapine 50 milliGRAM(s) Oral at bedtime      ALLERGIES:  No Known Allergies CONTACT INFO:  Amara Marley MD  Internal Medicine PGY1  Pager:  [phone]/ [phone]    M-F 7am-7pm:  pager covered by day team     *Academic conferences M-F 8am-9am & 12pm-1pm - page ONLY if urgent or if consultant  M-F & Sa-Sun 7pm-7am:  NS  page 1443 for Teams 1-3, 1446 for Team 4 & Care Model/ [facility] Page [id number] for T 1-3 and 08168 for T4 &CM  Sa-Sun 7am-12pm:  see patient chart, primary physician assigned available 7am-12pm  Sa-Sun 12pm-7pm:  NS  page 1443 for Teams 1-4, [facility]  page Covering Resident    BRITNEY GARCIA  22y  Male    Patient is a 22y old  Male who presents with a chief complaint of Facial droop, Seizure-like activity (14 Apr 2018 12:20)    22M with no PMHx who presents to the ED with L-sided facial droop possibly 2/2 Bell's Palsy vs conversion disorder, also with recent seizure-like activity concerning for epilepsy vs psychogenic non epileptic seizure, s/p 7 day tx for [place] Palsy, with normal MRI now s/p LP, with chest pain and SOB. All symptoms are likely in the setting of conversion disorder and unlikely organic in nature.    INTERVAL HPI / OVERNIGHT EVENTS: ELLIE pt this morning was very frustrated as he feels team is not addressing his pain. Pt. was explained that the etiology of his right side jaw pain, associated headaches, and chest burning have yet to be clearly elucidated. Thus far his medical workup is without any organic cause for pain. Pt. has been afebrile without leukocytosis and have MRI head which show no clear pathology. Pt was made aware that pain medication escalation to IV or narcotics is not warranted as we don't know source of pain and vital signs are without tachycardia and       OBJECTIVE:  Telemetry (24 Hrs):     Vitals Signs (24 Hrs):  T(C): 36.7 (04-18-18 @ 06:16), Max: 36.7 (04-18-18 @ 06:16)  HR: 80 (04-18-18 @ 06:16) (66 - 89)  BP: 111/72 (04-18-18 @ 06:16) (98/60 - 119/87)  RR: 17 (04-18-18 @ 06:16) (17 - 18)  SpO2: 100% (04-18-18 @ 06:16) (99% - 100%)    PHYSICAL EXAM:  General: Comfortable, no apparent distress  HEENT: Atraumatic; EOMI, PERRLA, conjunctiva and sclera clear; no tonsillar erythema/exudates/enlargement  Neck: Supple; no JVD; thyroid normal without masses or enlargement  Chest/Lungs: Clear to auscultation B/L; no rales, rhonchi or wheezing  Heart: Regular rate and rhythm; normal S1/S2; no murmurs, rubs, or gallops  Abdomen: Soft, nontender, nondistended; bowel sounds present  Extremities: PT/DP pulses 2+ B/L; no edema  Skin: No rashes or lesions  Neurological: Alert and oriented to person/place/time    LABS:    04-17    137  |  98  |  20  ----------------------------<  65<L>  3.7   |  21<L>  |  1.02    Ca    9.1      17 Apr 2018 05:45  Phos  2.6     04-17  Mg     2.0     04-17          CAPILLARY BLOOD GLUCOSE      POCT Blood Glucose.: 78 mg/dL (18 Apr 2018 05:43)  POCT Blood Glucose.: 105 mg/dL (17 Apr 2018 23:29)  POCT Blood Glucose.: 73 mg/dL (17 Apr 2018 22:25)  POCT Blood Glucose.: 82 mg/dL (17 Apr 2018 16:54)  POCT Blood Glucose.: 146 mg/dL (17 Apr 2018 09:15)  POCT Blood Glucose.: 67 mg/dL (17 Apr 2018 08:48)  POCT Blood Glucose.: 65 mg/dL (17 Apr 2018 07:43)          RADIOLOGY & ADDITIONAL TESTS:      MEDICATIONS:  acetaminophen   Tablet. 650 milliGRAM(s) Oral every 6 hours PRN Mild Pain (1 - 3)  aluminum hydroxide/magnesium hydroxide/simethicone Suspension 30 milliLiter(s) Oral every 6 hours PRN Dyspepsia  artificial  tears Solution 1 Drop(s) Both EYES two times a day  artificial tears (preservative free) Ophthalmic Solution 1 Drop(s) Right EYE four times a day  famotidine   Suspension 20 milliGRAM(s) Oral two times a day  ibuprofen  Tablet 400 milliGRAM(s) Oral every 8 hours PRN moderate to severe pain  lidocaine   Patch 1 Patch Transdermal daily  lidocaine 1% (Preservative-free) Injectable 10 milliLiter(s) Local Injection once  LORazepam     Tablet 0.5 milliGRAM(s) Oral two times a day  LORazepam     Tablet 1 milliGRAM(s) Oral every 6 hours PRN Anxiety  LORazepam   Injectable 0.5 milliGRAM(s) IV Push every 6 hours PRN severe agitation  ondansetron   Disintegrating Tablet 4 milliGRAM(s) Oral three times a day PRN Nausea and/or Vomiting  pantoprazole   Suspension 40 milliGRAM(s) Oral before breakfast  QUEtiapine 50 milliGRAM(s) Oral at bedtime      ALLERGIES:  No Known Allergies CONTACT INFO:  Amara Marley MD  Internal Medicine PGY1  Pager:  [phone]/ [phone]    M-F 7am-7pm:  pager covered by day team     *Academic conferences M-F 8am-9am & 12pm-1pm - page ONLY if urgent or if consultant  M-F & Sa-Sun 7pm-7am:  NS  page 1443 for Teams 1-3, 1446 for Team 4 & Care Model/ [facility] Page [id number] for T 1-3 and 35260 for T4 &CM  Sa-Sun 7am-12pm:  see patient chart, primary physician assigned available 7am-12pm  Sa-Sun 12pm-7pm:  NS  page 1443 for Teams 1-4, [facility]  page Covering Resident    BRITNEY GARCIA  22y  Male    Patient is a 22y old  Male who presents with a chief complaint of Facial droop, Seizure-like activity (14 Apr 2018 12:20)    22M with no PMHx who presents to the ED with L-sided facial droop possibly 2/2 Bell's Palsy vs conversion disorder, also with recent seizure-like activity concerning for epilepsy vs psychogenic non epileptic seizure, s/p 7 day tx for [place] Palsy, with normal MRI now s/p LP, with chest pain and SOB. All symptoms are likely in the setting of conversion disorder and unlikely organic in nature.    INTERVAL HPI / OVERNIGHT EVENTS: ELLIE pt this morning was very frustrated as he feels team is not addressing his pain. Pt. was explained that the etiology of his right side jaw pain, associated headaches, and chest burning have yet to be clearly elucidated. Thus far his medical workup is without any organic cause for pain. Pt. has been afebrile without leukocytosis and have MRI head which show no clear pathology. Pt was made aware that pain medication escalation to IV or narcotics is not warranted as we don't know source of pain and vital signs are without tachycardia or increased RR to show any acute distress. During interview pateint became agitated and facial droop appeared to diminish and patient upon initial presentation was noted to have left sided droop, but this morning he was able to move left side and wasn't moving right side of face. Pt. also was minimally cooperative when asked to smile. Pt. during periods of agitation said during peak episodes of pain he feels like he wants to hurt himself or others although he doesn't have plans to. Pt feels he does not need psychiatric evaluation and doesn't really want to hurt himself or others, but pain is unbearable.      OBJECTIVE:  Telemetry (24 Hrs):     Vitals Signs (24 Hrs):  T(C): 36.7 (04-18-18 @ 06:16), Max: 36.7 (04-18-18 @ 06:16)  HR: 80 (04-18-18 @ 06:16) (66 - 89)  BP: 111/72 (04-18-18 @ 06:16) (98/60 - 119/87)  RR: 17 (04-18-18 @ 06:16) (17 - 18)  SpO2: 100% (04-18-18 @ 06:16) (99% - 100%)    PHYSICAL EXAM  GENERAL: NAD, well-developed, agitated  HEAD:  Atraumatic, Normocephalic  NECK: Supple, No JVD, no lymphadenopathy no oral ulcer or lesions visible (patient poorly compliant with exam), no clear sign of right jaw pain  CHEST/LUNG: Clear to auscultation bilaterally; No wheeze  HEART: Regular rate and rhythm; No murmurs, rubs, or gallops  ABDOMEN: Soft, Nontender, Nondistended; Bowel sounds present  EXTREMITIES:  2+ Peripheral Pulses, No clubbing, cyanosis, or edema  PSYCH: flat affect, not answering questions   NEUROLOGY: facial drooping on Left, patient not moving right side of face  SKIN: No rashes or lesions    LABS:    04-17    137  |  98  |  20  ----------------------------<  65<L>  3.7   |  21<L>  |  1.02    Ca    9.1      17 Apr 2018 05:45  Phos  2.6     04-17  Mg     2.0     04-17          CAPILLARY BLOOD GLUCOSE      POCT Blood Glucose.: 78 mg/dL (18 Apr 2018 05:43)  POCT Blood Glucose.: 105 mg/dL (17 Apr 2018 23:29)  POCT Blood Glucose.: 73 mg/dL (17 Apr 2018 22:25)  POCT Blood Glucose.: 82 mg/dL (17 Apr 2018 16:54)  POCT Blood Glucose.: 146 mg/dL (17 Apr 2018 09:15)  POCT Blood Glucose.: 67 mg/dL (17 Apr 2018 08:48)  POCT Blood Glucose.: 65 mg/dL (17 Apr 2018 07:43)          RADIOLOGY & ADDITIONAL TESTS:      MEDICATIONS:  acetaminophen   Tablet. 650 milliGRAM(s) Oral every 6 hours PRN Mild Pain (1 - 3)  aluminum hydroxide/magnesium hydroxide/simethicone Suspension 30 milliLiter(s) Oral every 6 hours PRN Dyspepsia  artificial  tears Solution 1 Drop(s) Both EYES two times a day  artificial tears (preservative free) Ophthalmic Solution 1 Drop(s) Right EYE four times a day  famotidine   Suspension 20 milliGRAM(s) Oral two times a day  ibuprofen  Tablet 400 milliGRAM(s) Oral every 8 hours PRN moderate to severe pain  lidocaine   Patch 1 Patch Transdermal daily  lidocaine 1% (Preservative-free) Injectable 10 milliLiter(s) Local Injection once  LORazepam     Tablet 0.5 milliGRAM(s) Oral two times a day  LORazepam     Tablet 1 milliGRAM(s) Oral every 6 hours PRN Anxiety  LORazepam   Injectable 0.5 milliGRAM(s) IV Push every 6 hours PRN severe agitation  ondansetron   Disintegrating Tablet 4 milliGRAM(s) Oral three times a day PRN Nausea and/or Vomiting  pantoprazole   Suspension 40 milliGRAM(s) Oral before breakfast  QUEtiapine 50 milliGRAM(s) Oral at bedtime      ALLERGIES:  No Known Allergies CONTACT INFO:  Amara Marley MD  Internal Medicine PGY1  Pager:  [phone]/ [phone]    M-F 7am-7pm:  pager covered by day team     *Academic conferences M-F 8am-9am & 12pm-1pm - page ONLY if urgent or if consultant  M-F & Sa-Sun 7pm-7am:  NS  page 1443 for Teams 1-3, 1446 for Team 4 & Care Model/ [facility] Page [id number] for T 1-3 and 55007 for T4 &CM  Sa-Sun 7am-12pm:  see patient chart, primary physician assigned available 7am-12pm  Sa-Sun 12pm-7pm:  NS  page 1443 for Teams 1-4, [facility]  page Covering Resident    BRITNEY GARCIA  22y  Male    Patient is a 22y old  Male who presents with a chief complaint of Facial droop, Seizure-like activity (14 Apr 2018 12:20)    22M with no PMHx who presents to the ED with L-sided facial droop possibly 2/2 Bell's Palsy vs conversion disorder, also with recent seizure-like activity concerning for epilepsy vs psychogenic non epileptic seizure, s/p 7 day tx for [place] Palsy, with normal MRI now s/p LP, with chest pain and SOB. All symptoms are likely in the setting of conversion disorder and unlikely organic in nature.    INTERVAL HPI / OVERNIGHT EVENTS: ELLIE pt this morning was very frustrated as he feels team is not addressing his pain. Pt. was explained that the etiology of his right side jaw pain, associated headaches, and chest burning have yet to be clearly elucidated. Thus far his medical workup is without any organic cause for pain. Pt. has been afebrile without leukocytosis and have MRI head which show no clear pathology. Pt was made aware that pain medication escalation to IV or narcotics is not warranted as we don't know source of pain and vital signs are without tachycardia or increased RR to show any acute distress. During interview pateint became agitated and facial droop appeared to diminish and patient upon initial presentation was noted to have left sided droop, but this morning he was able to move left side and wasn't moving right side of face. Pt. also was minimally cooperative when asked to smile. Pt. during periods of agitation said during peak episodes of pain he feels like he wants to hurt himself or others although he doesn't have plans to. Pt feels he does not need psychiatric evaluation and doesn't really want to hurt himself or others, but pain is unbearable.      OBJECTIVE:  Telemetry (24 Hrs):     Vitals Signs (24 Hrs):  T(C): 36.7 (04-18-18 @ 06:16), Max: 36.7 (04-18-18 @ 06:16)  HR: 80 (04-18-18 @ 06:16) (66 - 89)  BP: 111/72 (04-18-18 @ 06:16) (98/60 - 119/87)  RR: 17 (04-18-18 @ 06:16) (17 - 18)  SpO2: 100% (04-18-18 @ 06:16) (99% - 100%)    PHYSICAL EXAM  GENERAL: NAD, well-developed, agitated  HEAD:  Atraumatic, Normocephalic  NECK: Supple, No JVD, no lymphadenopathy no oral ulcer or lesions visible (patient poorly compliant with exam), no clear sign of right jaw pain  CHEST/LUNG: Clear to auscultation bilaterally; No wheeze  HEART: Regular rate and rhythm; No murmurs, rubs, or gallops  ABDOMEN: Soft, Nontender, Nondistended; Bowel sounds present  EXTREMITIES:  2+ Peripheral Pulses, No clubbing, cyanosis, or edema  PSYCH: flat affect, not answering questions   NEUROLOGY: facial drooping on Left, patient not moving right side of face  SKIN: No rashes or lesions    LABS:    04-17    137  |  98  |  20  ----------------------------<  65<L>  3.7   |  21<L>  |  1.02    Ca    9.1      17 Apr 2018 05:45  Phos  2.6     04-17  Mg     2.0     04-17          CAPILLARY BLOOD GLUCOSE      POCT Blood Glucose.: 78 mg/dL (18 Apr 2018 05:43)  POCT Blood Glucose.: 105 mg/dL (17 Apr 2018 23:29)  POCT Blood Glucose.: 73 mg/dL (17 Apr 2018 22:25)  POCT Blood Glucose.: 82 mg/dL (17 Apr 2018 16:54)  POCT Blood Glucose.: 146 mg/dL (17 Apr 2018 09:15)  POCT Blood Glucose.: 67 mg/dL (17 Apr 2018 08:48)  POCT Blood Glucose.: 65 mg/dL (17 Apr 2018 07:43)          RADIOLOGY & ADDITIONAL TESTS: Imaging Personally Reviewed  < from: Xray Upper GI Series (04.18.18 @ 12:04) >  IMPRESSION:     1. 13 mm barium tablet stuck at the level of the midesophagus on initial   and 20 minute delayed images of uncertain etiology.  2. Mildly delayed passage of contrast from the stomach into the duodenum.    Comment: Endoscopy might be more informative.    < end of copied text >        MEDICATIONS:  acetaminophen   Tablet. 650 milliGRAM(s) Oral every 6 hours PRN Mild Pain (1 - 3)  aluminum hydroxide/magnesium hydroxide/simethicone Suspension 30 milliLiter(s) Oral every 6 hours PRN Dyspepsia  artificial  tears Solution 1 Drop(s) Both EYES two times a day  artificial tears (preservative free) Ophthalmic Solution 1 Drop(s) Right EYE four times a day  famotidine   Suspension 20 milliGRAM(s) Oral two times a day  ibuprofen  Tablet 400 milliGRAM(s) Oral every 8 hours PRN moderate to severe pain  lidocaine   Patch 1 Patch Transdermal daily  lidocaine 1% (Preservative-free) Injectable 10 milliLiter(s) Local Injection once  LORazepam     Tablet 0.5 milliGRAM(s) Oral two times a day  LORazepam     Tablet 1 milliGRAM(s) Oral every 6 hours PRN Anxiety  LORazepam   Injectable 0.5 milliGRAM(s) IV Push every 6 hours PRN severe agitation  ondansetron   Disintegrating Tablet 4 milliGRAM(s) Oral three times a day PRN Nausea and/or Vomiting  pantoprazole   Suspension 40 milliGRAM(s) Oral before breakfast  QUEtiapine 50 milliGRAM(s) Oral at bedtime      ALLERGIES:  No Known Allergies    CASE DISCUSSED WITH: Dr. Sauceda (GI) re: plan of care, GI series findings, plan for EGD in AM  CONSULTANT NOTES REVIEWED: Psych

## 2018-04-18 NOTE — PROGRESS NOTE ADULT - ASSESSMENT
22M with no PMHx who presents to the ED with L-sided facial droop possibly 2/2 Bell's Palsy vs conversion disorder, also with recent seizure-like activity concerning for epilepsy vs psychogenic non epileptic seizure, s/p 7 day tx for [place] Palsy, with normal MRI now s/p LP, with chest pain and SOB. All symptoms are likely in the setting of conversion disorder and unlikely organic in nature.

## 2018-04-18 NOTE — PROGRESS NOTE BEHAVIORAL HEALTH - NSBHCHARTREVIEWIMAGING_PSY_A_CORE FT
< from: Xray Upper GI Series (04.18.18 @ 12:04) >    EXAM:  RAD UPPER GI      PROCEDURE DATE:  Apr 18 2018   INTERPRETATION:  CLINICAL INFORMATION: Nausea. Can't do EGD per GI.   Evaluate for obstruction.    TECHNIQUE: An upper GI series was performed utilizing thick and thin   barium as the contrast agent.    COMPARISON: No similar prior studies available for comparison.    FLUORO TIME: 4:13 minutes    IMPRESSION:     1. 13 mm barium tablet stuck at the level of the midesophagus on initial   and 20 minute delayed images of uncertain etiology.  2. Mildly delayed passage of contrast from the stomach into the duodenum.    Comment: Endoscopy might be more informative.    GERARDO MCCLURE M.D., RADIOLOGY RESIDENT  This document has been electronically signed.  SHANNA BOWERS M.D.; ATTENDING RADIOLOGIST  This document has been electronically signed. Apr 18 2018  1:46PM    < end of copied text >
< from: MR Head No Cont (04.08.18 @ 12:07) >    EXAM:  MR BRAIN    PROCEDURE DATE:  Apr 8 2018     CLINICAL INDICATION: Left facial droop, right facial numbness, question   seizure like activity head CT    IMPRESSION: Unremarkable MRI of the brain.    TRUE SMYTH M.D., ATTENDING RADIOLOGIST  This document has been electronically signed. Apr 8 2018  3:02PM
No recent imaging.
< from: MR Head No Cont (04.08.18 @ 12:07) >    EXAM:  MR BRAIN    PROCEDURE DATE:  Apr 8 2018   INTERPRETATION:    CLINICAL INDICATION: Left facial droop, right facial numbness, question   seizure like activity head CT    Comparison is made with the prior brain CT of 12:50 AM.  IMPRESSION: Unremarkable MRI of the brain.    TRUE SMYTH M.D., ATTENDING RADIOLOGIST  This document has been electronically signed. Apr 8 2018  3:02PM    < end of copied text >

## 2018-04-18 NOTE — PROGRESS NOTE ADULT - PROBLEM SELECTOR PLAN 6
- reported dysphagia to solids and liquids (except for water)  - patient had 2 episodes of vomitus today  - oropharynx exam WNL  - S&S showed no issues with mechanics for swallowing but pt had episode of emesis  - c/w IVF for now - will consider GI consult for dysphagia   - c/w zofran PRN for nausea and protonix - reported dysphagia to solids and liquids (except for water)  - patient has had intermittent episode of emesis  - oropharynx exam WNL  - S&S showed no issues with mechanics for swallowing but pt had episode of emesis  - c/w IVF for now - will consider GI consult for dysphagia   - c/w zofran PRN for nausea and protonix - reported dysphagia to solids and liquids (except for water)  - patient has had intermittent episode of emesis  - oropharynx exam WNL  - S&S showed no issues with mechanics for swallowing but pt had episode of emesis  - c/w IVF for now - GI consulted for dysphagia - will obtain upper GI series and esophagram to r/o obstruction  - c/w zofran PRN for nausea and protonix
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
## 2018-04-18 NOTE — PROGRESS NOTE BEHAVIORAL HEALTH - DETAILS
Generalized fatigue right eye unable to close eyelid left lip numbness, difficulty chewing and swallowing N/V associated with eating, intermittent burning pain radiating from epigastrium to neck, constipation Generalized weakness seizure activity

## 2018-04-18 NOTE — PROGRESS NOTE ADULT - PROBLEM SELECTOR PLAN 7
- Patient reported to have 3 episodes of whole-body shaking - no episodes in the hospital  - EEG w/o epileptic activity. 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

## 2018-04-19 VITALS
DIASTOLIC BLOOD PRESSURE: 74 MMHG | HEART RATE: 70 BPM | TEMPERATURE: 98 F | RESPIRATION RATE: 18 BRPM | SYSTOLIC BLOOD PRESSURE: 112 MMHG | OXYGEN SATURATION: 100 %

## 2018-04-19 LAB
APTT BLD: 34.1 SEC — SIGNIFICANT CHANGE UP (ref 27.5–37.4)
BUN SERPL-MCNC: 13 MG/DL — SIGNIFICANT CHANGE UP (ref 7–23)
CALCIUM SERPL-MCNC: 9 MG/DL — SIGNIFICANT CHANGE UP (ref 8.4–10.5)
CHLORIDE SERPL-SCNC: 102 MMOL/L — SIGNIFICANT CHANGE UP (ref 98–107)
CO2 SERPL-SCNC: 24 MMOL/L — SIGNIFICANT CHANGE UP (ref 22–31)
CREAT SERPL-MCNC: 1.07 MG/DL — SIGNIFICANT CHANGE UP (ref 0.5–1.3)
GLUCOSE SERPL-MCNC: 98 MG/DL — SIGNIFICANT CHANGE UP (ref 70–99)
HCT VFR BLD CALC: 47.8 % — SIGNIFICANT CHANGE UP (ref 39–50)
HGB BLD-MCNC: 16.3 G/DL — SIGNIFICANT CHANGE UP (ref 13–17)
INR BLD: 1.23 — HIGH (ref 0.88–1.17)
MAGNESIUM SERPL-MCNC: 2 MG/DL — SIGNIFICANT CHANGE UP (ref 1.6–2.6)
MCHC RBC-ENTMCNC: 29.7 PG — SIGNIFICANT CHANGE UP (ref 27–34)
MCHC RBC-ENTMCNC: 34.1 % — SIGNIFICANT CHANGE UP (ref 32–36)
MCV RBC AUTO: 87.2 FL — SIGNIFICANT CHANGE UP (ref 80–100)
NRBC # FLD: 0 — SIGNIFICANT CHANGE UP
PHOSPHATE SERPL-MCNC: 2.9 MG/DL — SIGNIFICANT CHANGE UP (ref 2.5–4.5)
PLATELET # BLD AUTO: 222 K/UL — SIGNIFICANT CHANGE UP (ref 150–400)
PMV BLD: 11.6 FL — SIGNIFICANT CHANGE UP (ref 7–13)
POTASSIUM SERPL-MCNC: 3.6 MMOL/L — SIGNIFICANT CHANGE UP (ref 3.5–5.3)
POTASSIUM SERPL-SCNC: 3.6 MMOL/L — SIGNIFICANT CHANGE UP (ref 3.5–5.3)
PROTHROM AB SERPL-ACNC: 13.7 SEC — HIGH (ref 9.8–13.1)
RBC # BLD: 5.48 M/UL — SIGNIFICANT CHANGE UP (ref 4.2–5.8)
RBC # FLD: 11.9 % — SIGNIFICANT CHANGE UP (ref 10.3–14.5)
SODIUM SERPL-SCNC: 139 MMOL/L — SIGNIFICANT CHANGE UP (ref 135–145)
WBC # BLD: 6.1 K/UL — SIGNIFICANT CHANGE UP (ref 3.8–10.5)
WBC # FLD AUTO: 6.1 K/UL — SIGNIFICANT CHANGE UP (ref 3.8–10.5)

## 2018-04-19 PROCEDURE — 99232 SBSQ HOSP IP/OBS MODERATE 35: CPT | Mod: 25,GC

## 2018-04-19 PROCEDURE — 99239 HOSP IP/OBS DSCHRG MGMT >30: CPT

## 2018-04-19 PROCEDURE — 44360 SMALL BOWEL ENDOSCOPY: CPT | Mod: GC

## 2018-04-19 PROCEDURE — 88305 TISSUE EXAM BY PATHOLOGIST: CPT | Mod: 26

## 2018-04-19 PROCEDURE — 99233 SBSQ HOSP IP/OBS HIGH 50: CPT

## 2018-04-19 PROCEDURE — 88312 SPECIAL STAINS GROUP 1: CPT | Mod: 26

## 2018-04-19 RX ORDER — QUETIAPINE FUMARATE 200 MG/1
1 TABLET, FILM COATED ORAL
Qty: 14 | Refills: 0 | OUTPATIENT
Start: 2018-04-19 | End: 2018-05-02

## 2018-04-19 RX ORDER — ACETAMINOPHEN 500 MG
2 TABLET ORAL
Qty: 0 | Refills: 0 | COMMUNITY
Start: 2018-04-19

## 2018-04-19 RX ORDER — VALACYCLOVIR 500 MG/1
1 TABLET, FILM COATED ORAL
Qty: 0 | Refills: 0 | COMMUNITY

## 2018-04-19 RX ORDER — IBUPROFEN 200 MG
1 TABLET ORAL
Qty: 0 | Refills: 0 | COMMUNITY
Start: 2018-04-19

## 2018-04-19 RX ORDER — POLYETHYLENE GLYCOL 3350 17 G/17G
17 POWDER, FOR SOLUTION ORAL ONCE
Qty: 0 | Refills: 0 | Status: COMPLETED | OUTPATIENT
Start: 2018-04-19 | End: 2018-04-19

## 2018-04-19 RX ORDER — PANTOPRAZOLE SODIUM 20 MG/1
40 TABLET, DELAYED RELEASE ORAL
Qty: 1200 | Refills: 0 | OUTPATIENT
Start: 2018-04-19 | End: 2018-05-18

## 2018-04-19 RX ADMIN — FAMOTIDINE 20 MILLIGRAM(S): 10 INJECTION INTRAVENOUS at 17:07

## 2018-04-19 RX ADMIN — SODIUM CHLORIDE 75 MILLILITER(S): 9 INJECTION, SOLUTION INTRAVENOUS at 00:01

## 2018-04-19 RX ADMIN — Medication 1 DROP(S): at 05:54

## 2018-04-19 RX ADMIN — Medication 400 MILLIGRAM(S): at 06:53

## 2018-04-19 RX ADMIN — Medication 1 DROP(S): at 17:08

## 2018-04-19 RX ADMIN — Medication 0.5 MILLIGRAM(S): at 18:52

## 2018-04-19 RX ADMIN — Medication 400 MILLIGRAM(S): at 05:53

## 2018-04-19 RX ADMIN — POLYETHYLENE GLYCOL 3350 17 GRAM(S): 17 POWDER, FOR SOLUTION ORAL at 18:01

## 2018-04-19 RX ADMIN — Medication 1 DROP(S): at 16:15

## 2018-04-19 RX ADMIN — Medication 30 MILLILITER(S): at 16:14

## 2018-04-19 NOTE — PROGRESS NOTE ADULT - SUBJECTIVE AND OBJECTIVE BOX
Patient is a 22y old  Male who presents with a chief complaint of Facial droop, Seizure-like activity (14 Apr 2018 12:20)      SUBJECTIVE / OVERNIGHT EVENTS:  Patient had an episode of emesis overnight.  Passing flatus but reports constipation.  Endorses continued dysphagia to solids.  MEDICATIONS  (STANDING):  artificial  tears Solution 1 Drop(s) Both EYES two times a day  artificial tears (preservative free) Ophthalmic Solution 1 Drop(s) Right EYE four times a day  dextrose 5% + lactated ringers. 1000 milliLiter(s) (75 mL/Hr) IV Continuous <Continuous>  famotidine   Suspension 20 milliGRAM(s) Oral two times a day  lidocaine   Patch 1 Patch Transdermal daily  lidocaine 1% (Preservative-free) Injectable 10 milliLiter(s) Local Injection once  LORazepam     Tablet 0.5 milliGRAM(s) Oral two times a day  pantoprazole   Suspension 40 milliGRAM(s) Oral before breakfast  QUEtiapine 50 milliGRAM(s) Oral at bedtime    MEDICATIONS  (PRN):  acetaminophen   Tablet. 650 milliGRAM(s) Oral every 6 hours PRN Mild Pain (1 - 3)  aluminum hydroxide/magnesium hydroxide/simethicone Suspension 30 milliLiter(s) Oral every 6 hours PRN Dyspepsia  ibuprofen  Tablet 400 milliGRAM(s) Oral every 8 hours PRN moderate to severe pain  LORazepam     Tablet 1 milliGRAM(s) Oral every 6 hours PRN Anxiety  LORazepam   Injectable 0.5 milliGRAM(s) IV Push every 6 hours PRN severe agitation  ondansetron   Disintegrating Tablet 4 milliGRAM(s) Oral three times a day PRN Nausea and/or Vomiting              PHYSICAL EXAM:  GENERAL: NAD, well-developed  HEAD:  Atraumatic, Normocephalic  EYES: EOMI, PERRLA, conjunctiva and sclera anicteric  NECK: Supple, No JVD  CHEST/LUNG: Clear to auscultation bilaterally; No wheeze  HEART: Regular rate and rhythm; No murmurs, rubs, or gallops  ABDOMEN: Soft, Nontender, Nondistended; Bowel sounds present, no hepatosplenomegaly, no rebound or guarding  EXTREMITIES:  2+ Peripheral Pulses, No clubbing, cyanosis, or edema  PSYCH: AAOx3  NEUROLOGY: L facial droop  SKIN: No rashes or lesion    LABS:                        16.3   6.10  )-----------( 222      ( 19 Apr 2018 05:28 )             47.8     04-19    139  |  102  |  13  ----------------------------<  98  3.6   |  24  |  1.07    Ca    9.0      19 Apr 2018 05:28  Phos  2.9     04-19  Mg     2.0     04-19        PT/INR - ( 19 Apr 2018 05:28 )   PT: 13.7 SEC;   INR: 1.23          PTT - ( 19 Apr 2018 05:28 )  PTT:34.1 SEC          RADIOLOGY & ADDITIONAL TESTS: Patient is a 22y old  Male who presents with a chief complaint of Facial droop, Seizure-like activity (14 Apr 2018 12:20)      SUBJECTIVE / OVERNIGHT EVENTS:  Patient had an episode of emesis overnight.  Passing flatus but reports constipation.  Endorses continued dysphagia to solids.  Intermittent chest pain, almost exclusively triggered by vomiting, nonexertional.   MEDICATIONS  (STANDING):  artificial  tears Solution 1 Drop(s) Both EYES two times a day  artificial tears (preservative free) Ophthalmic Solution 1 Drop(s) Right EYE four times a day  dextrose 5% + lactated ringers. 1000 milliLiter(s) (75 mL/Hr) IV Continuous <Continuous>  famotidine   Suspension 20 milliGRAM(s) Oral two times a day  lidocaine   Patch 1 Patch Transdermal daily  lidocaine 1% (Preservative-free) Injectable 10 milliLiter(s) Local Injection once  LORazepam     Tablet 0.5 milliGRAM(s) Oral two times a day  pantoprazole   Suspension 40 milliGRAM(s) Oral before breakfast  QUEtiapine 50 milliGRAM(s) Oral at bedtime    MEDICATIONS  (PRN):  acetaminophen   Tablet. 650 milliGRAM(s) Oral every 6 hours PRN Mild Pain (1 - 3)  aluminum hydroxide/magnesium hydroxide/simethicone Suspension 30 milliLiter(s) Oral every 6 hours PRN Dyspepsia  ibuprofen  Tablet 400 milliGRAM(s) Oral every 8 hours PRN moderate to severe pain  LORazepam     Tablet 1 milliGRAM(s) Oral every 6 hours PRN Anxiety  LORazepam   Injectable 0.5 milliGRAM(s) IV Push every 6 hours PRN severe agitation  ondansetron   Disintegrating Tablet 4 milliGRAM(s) Oral three times a day PRN Nausea and/or Vomiting              PHYSICAL EXAM:  GENERAL: NAD, well-developed  HEAD:  Atraumatic, Normocephalic  EYES: EOMI, PERRLA, conjunctiva and sclera anicteric  NECK: Supple, No JVD  CHEST/LUNG: Clear to auscultation bilaterally; No wheeze  HEART: Regular rate and rhythm; No murmurs, rubs, or gallops  ABDOMEN: Soft, Nontender, Nondistended; Bowel sounds present, no hepatosplenomegaly, no rebound or guarding  EXTREMITIES:  2+ Peripheral Pulses, No clubbing, cyanosis, or edema  PSYCH: AAOx3  NEUROLOGY: L facial droop  SKIN: No rashes or lesion    LABS:                        16.3   6.10  )-----------( 222      ( 19 Apr 2018 05:28 )             47.8     04-19    139  |  102  |  13  ----------------------------<  98  3.6   |  24  |  1.07    Ca    9.0      19 Apr 2018 05:28  Phos  2.9     04-19  Mg     2.0     04-19        PT/INR - ( 19 Apr 2018 05:28 )   PT: 13.7 SEC;   INR: 1.23          PTT - ( 19 Apr 2018 05:28 )  PTT:34.1 SEC          RADIOLOGY & ADDITIONAL TESTS:

## 2018-04-19 NOTE — PROGRESS NOTE BEHAVIORAL HEALTH - NSBHCONSULTMEDPRNREASON_PSY_A_CORE
severe agitation.../anxiety...
anxiety.../severe agitation...
severe agitation...
anxiety.../severe agitation...
severe agitation...
severe agitation.../anxiety...
anxiety.../severe agitation...
severe agitation...

## 2018-04-19 NOTE — PROGRESS NOTE BEHAVIORAL HEALTH - NSBHPTASSESSDT_PSY_A_CORE
10-Apr-2018 08:45
12-Apr-2018 11:00
13-Apr-2018 11:50
16-Apr-2018 08:35
18-Apr-2018 08:50
11-Apr-2018 10:30
19-Apr-2018 09:15
17-Apr-2018 13:00

## 2018-04-19 NOTE — PROVIDER CONTACT NOTE (OTHER) - ACTION/TREATMENT ORDERED:
As per Dr Ortiz. Hold 6 am meds.
Team 2 notified, recommended encouraging juice/ PO intake. Pt ate several bites of cake- FS rechecked to be 105. Will continue to monitor and assess.
Dr. Ortiz assessed pt. If no improvement in FS after orange juice in 1hr, 25ml 50% Dextrose ordered to be given. Report given to IRENA Bowers- reassessment at 0845.
No treatment ordered at this time. Will continue educate patient.
Ok to give Ibuprofen with sips of water.
to have an EKG and give pain medication

## 2018-04-19 NOTE — PROGRESS NOTE ADULT - ASSESSMENT
Impression:    1. Dysphagia: Ddx includes EoE, mass lesion, peptic stricture, dysmotility. Positive barium study.    2. Nausea and vomiting: XR on UGI series showed non obstructive patter Impression:    1. Dysphagia: Ddx includes EoE, mass lesion, peptic stricture, dysmotility. Positive barium study.    2. Nausea and vomiting: XR on UGI series showed non obstructive pattern. Clinically not obstructed. ?medication related, less likley gastroparesis.     3. ?conversion disorder    4. GERD    Recommendation:  -continue PPI/H2RA  -EGD today +/- general anesthesia given history of vomiting Impression:    1. Dysphagia: Ddx includes EoE, mass lesion, peptic stricture, dysmotility. Positive barium study.    2. Nausea and vomiting: XR on UGI series showed non obstructive pattern. Clinically not obstructed. ?medication related, less likley gastroparesis.     3. ?conversion disorder    4. GERD    5. Atypical chest pain: ECG WNL. No fhx of CAD. Chest pain is nonexertional, most likely related to esophageal complaints. D/w Dr. Gr of anesthesia. we agree patient is low risk.    Recommendation:  -continue PPI/H2RA  -EGD today +/- general anesthesia given history of vomiting Impression:    1. Dysphagia: Ddx includes EoE, mass lesion, peptic stricture, dysmotility. Positive barium study with retained barium tablet and slow gastric emptying.     2. Nausea and vomiting: XR on UGI series showed non obstructive pattern. Clinically not obstructed. ?medication related, less likley gastroparesis.     3. ?conversion disorder    4. GERD    5. Atypical chest pain: ECG WNL. No fhx of CAD. Chest pain is nonexertional, most likely related to esophageal complaints. D/w Dr. Gr of anesthesia. we agree patient is low risk.    Recommendation:  -continue PPI/H2RA  -EGD today +/- general anesthesia given history of vomiting

## 2018-04-19 NOTE — PROGRESS NOTE BEHAVIORAL HEALTH - NSBHCONSULTFOLLOWAFTERCARE_PSY_A_CORE FT
Follow-up with outpatient psychotherapy  Continue with new psychiatric medications Ativan, Seroquel Continue with new psychiatric medications Ativan, Seroquel  Follow-up with outpatient psychotherapy:    [facility] [phone]  [facility] [phone] - NOTE: Please go to Walk-in Clinic first in order to obtain an appointment for Outpatient Clinic    Behavioral Health Clinics in Zip: [id number]- From [facility]  1)      [facility] - [phone]  2)      [facility] - [phone]  3)      [facility] - [phone]

## 2018-04-19 NOTE — PROGRESS NOTE BEHAVIORAL HEALTH - THOUGHT CONTENT
Unremarkable

## 2018-04-19 NOTE — PROGRESS NOTE BEHAVIORAL HEALTH - NSBHCHARTREVIEWLAB_PSY_A_CORE FT
CBC Full  -  ( 19 Apr 2018 05:28 )  WBC Count : 6.10 K/uL  Hemoglobin : 16.3 g/dL  Hematocrit : 47.8 %  Platelet Count - Automated : 222 K/uL  Mean Cell Volume : 87.2 fL  Mean Cell Hemoglobin : 29.7 pg  Mean Cell Hemoglobin Concentration : 34.1 %  Auto Neutrophil # : x  Auto Lymphocyte # : x  Auto Monocyte # : x  Auto Eosinophil # : x  Auto Basophil # : x  Auto Neutrophil % : x  Auto Lymphocyte % : x  Auto Monocyte % : x  Auto Eosinophil % : x  Auto Basophil % : x    04-19    139  |  102  |  13  ----------------------------<  98  3.6   |  24  |  1.07    Ca    9.0      19 Apr 2018 05:28  Phos  2.9     04-19  Mg     2.0     04-19
04-17    137  |  98  |  20  ----------------------------<  65<L>  3.7   |  21<L>  |  1.02    Ca    9.1      17 Apr 2018 05:45  Phos  2.6     04-17  Mg     2.0     04-17
04-18    139  |  99  |  15  ----------------------------<  77  3.5   |  26  |  1.00    Ca    9.0      18 Apr 2018 05:46  Phos  2.5     04-18  Mg     1.9     04-18    POCT  Blood Glucose (04.18.18 @ 12:47)    POCT Blood Glucose.: 74 mg/dL    Lipase, Serum (04.18.18 @ 05:46)    Lipase, Serum: 67.5 U/L    Basic Metabolic Panel w/Mg &amp; Inorg Phos (04.18.18 @ 05:46)    eGFR if : 122 mL/min    eGFR if Non : 106: The units for eGFR are ml/min/1.73m2 (normalized body  surface area).       Calcium, Total Serum: 9.0 mg/dL    Phosphorus Level, Serum: 2.5 mg/dL    Sodium, Serum: 139 mmol/L    Potassium, Serum: 3.5 mmol/L    Chloride, Serum: 99 mmol/L    Carbon Dioxide, Serum: 26 mmol/L    Blood Urea Nitrogen, Serum: 15 mg/dL    Creatinine, Serum: 1.00 mg/dL    Glucose, Serum: 77 mg/dL    Magnesium, Serum: 1.9 mg/dL
Cytopathology - Non Gyn Report (04.11.18 @ 21:44)    Cytopathology - Non Gyn Report:   ACCESSION No:  [id number]    RADHA, DAMONLYNWOOD                       1    Cytopathology Report  Specimen(s) Submitted  CEREBROSPINAL FLUID    Clinical History  22 year old male with facialdrop, possible seizure activity,  Viral vs Autoimmune    Gross Description  Received: 3  ml clear fluid in CytoLyt  Prepared: 1 Cytospin slide    Final Diagnosis  CEREBROSPINAL FLUID  NEGATIVE FOR MALIGNANT CELLS.    Few lymphocytes and mononuclear cells.    Screened by: Roberta FAIR(ASCP)  Verified by: Evi Price MD  (Electronic Signature)  Reported on: 04/12/18 14:59 EDT, [address] [place] [address]  Cytology technical processing performed at [address] [place] [address]  _________________________________________________________________
CBC Full  -  ( 10 Apr 2018 05:52 )  WBC Count : 9.68 K/uL  Hemoglobin : 16.2 g/dL  Hematocrit : 48.2 %  Platelet Count - Automated : 248 K/uL  Mean Cell Volume : 88.0 fL  Mean Cell Hemoglobin : 29.6 pg  Mean Cell Hemoglobin Concentration : 33.6 %  Auto Neutrophil # : 5.38 K/uL  Auto Lymphocyte # : 3.35 K/uL  Auto Monocyte # : 0.78 K/uL  Auto Eosinophil # : 0.09 K/uL  Auto Basophil # : 0.04 K/uL  Auto Neutrophil % : 55.6 %  Auto Lymphocyte % : 34.6 %  Auto Monocyte % : 8.1 %  Auto Eosinophil % : 0.9 %  Auto Basophil % : 0.4 %    04-10    138  |  98  |  16  ----------------------------<  75  3.8   |  27  |  1.02    Ca    9.1      10 Apr 2018 05:52  Phos  2.7     04-09  Mg     2.1     04-09    TPro  7.0  /  Alb  4.1  /  TBili  0.4  /  DBili  x   /  AST  19  /  ALT  40  /  AlkPhos  75  04-10    LIVER FUNCTIONS - ( 10 Apr 2018 05:52 )  Alb: 4.1 g/dL / Pro: 7.0 g/dL / ALK PHOS: 75 u/L / ALT: 40 u/L / AST: 19 u/L / GGT: x
CBC Full  -  ( 10 Apr 2018 05:52 )  WBC Count : 9.68 K/uL  Hemoglobin : 16.2 g/dL  Hematocrit : 48.2 %  Platelet Count - Automated : 248 K/uL  Mean Cell Volume : 88.0 fL  Mean Cell Hemoglobin : 29.6 pg  Mean Cell Hemoglobin Concentration : 33.6 %  Auto Neutrophil # : 5.38 K/uL  Auto Lymphocyte # : 3.35 K/uL  Auto Monocyte # : 0.78 K/uL  Auto Eosinophil # : 0.09 K/uL  Auto Basophil # : 0.04 K/uL  Auto Neutrophil % : 55.6 %  Auto Lymphocyte % : 34.6 %  Auto Monocyte % : 8.1 %  Auto Eosinophil % : 0.9 %  Auto Basophil % : 0.4 %    04-10    138  |  98  |  16  ----------------------------<  75  3.8   |  27  |  1.02    Ca    9.1      10 Apr 2018 05:52    TPro  7.0  /  Alb  4.1  /  TBili  0.4  /  DBili  x   /  AST  19  /  ALT  40  /  AlkPhos  75  04-10    LIVER FUNCTIONS - ( 10 Apr 2018 05:52 )  Alb: 4.1 g/dL / Pro: 7.0 g/dL / ALK PHOS: 75 u/L / ALT: 40 u/L / AST: 19 u/L / GGT: x
Culture - CSF with Gram Stain . (04.11.18 @ 18:52)   Gram Stain Spinal Fluid:   NOS^No Organisms Seen  WBC^White Blood Cells  QNTY CELLS IN GRAM STAIN: NO CELLS SEEN   Culture - CSF:   NO GROWTH - PRELIMINARY RESULTS    Specimen Source: CEREBRAL SPINAL FLUID    Spinal Fluid Differential (04.11.18 @ 18:20)    Lymphocyte Count, CSF: 75 %    Cerebrospinal Fluid Cell Count-1 (04.11.18 @ 18:20)    CSF Clarity: CLEAR    CSF Color: COLORLESS    Total Nucleated Cell Count, CSF: 1 cell/uL    Protein, CSF (04.11.18 @ 13:47)    Protein, CSF: 19.2 mg/dL  Glucose, CSF (04.11.18 @ 13:47)    Glucose, CSF: 82 mg/dL    CSF PCR Panel (04.11.18 @ 13:46)    CSF PCR Result: NOT DETECTED This nucleic acid amplification assay was performed using  the Fingerprint. The following pathogens are tested  for: Escherichia coli K1; Haemophilus influenzae; Listeria  monocytogenes; Neisseria meningitidis; Streptococcus  agalactiae, S. pneumoniae; Cytomegalovirus;  Enterovirus; Herpes simplex virus 1; Herpes simplex virus 2;  Human herpesvirus 6; Human parechovirus; Varicella zoster  virus; Cryptococcus neoformans.    A negative FilmArray ME Panel result does not exclude the  possibility of CNS infection and should not be used as the  sole basis for diagnosis, treatment, or other management  decisions.

## 2018-04-19 NOTE — PROGRESS NOTE ADULT - PROVIDER SPECIALTY LIST ADULT
Internal Medicine
Neurology
Neurology
Gastroenterology

## 2018-04-19 NOTE — PROGRESS NOTE BEHAVIORAL HEALTH - NSBHFUPINTERVALHXFT_PSY_A_CORE
No acute overnight events, no PRN psychiatric medications.  Pt seen at bedside, awake and sitting up, A&Ox4.  Pt feeling better today, the pain inferior to his R ear is somewhat improved and his R eye is not bothering him as much, he was able to sleep well through the night.  Pt did not bring up the topic of leaving the hospital or transferring to another hospital.  Pt scheduled to have esophagram this AM, states he prays he will get better, looking forward to going home and resting.

## 2018-04-19 NOTE — PROGRESS NOTE BEHAVIORAL HEALTH - DETAILS
Generalized fatigue left lip numbness, difficulty chewing and swallowing N/V associated with eating, intermittent burning pain radiating from epigastrium to neck associated with N/V Generalized weakness seizure activity
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
## 2018-04-19 NOTE — PROGRESS NOTE BEHAVIORAL HEALTH - PERCEPTIONS
No abnormalities

## 2018-04-19 NOTE — PROGRESS NOTE ADULT - PROBLEM SELECTOR PLAN 4
- presents with  4-day history of sudden-onset L facial droop a/w R-facial numbness, inability to close R-eye, and severe headache- thought to be 2/2 to bells palsy per outside hospital evaluation but has also been seen as manifestation of conversion disorder  - waxing and waning facial droop, patient able to move mouth to right side; droop disappearing when patient speaking or agitated  - MRI of head negative and neurological exam does not correlate with physiologic pathology/ now complain of right sided jaw pain - patient various complaints are inconsistent and no clear source of pathology  - s/p course of steroids and valtrex for bells palsy w/o improvement  - ddx includes conversion disorder vs autoimmune process vs infection  - s/p LP on 4/11- cell count normal, gram stain negative, culture neg, PCR neg

## 2018-04-19 NOTE — PROGRESS NOTE ADULT - PROBLEM SELECTOR PLAN 6
- reported dysphagia to solids and liquids (except for water)  - patient has had intermittent episode of emesis  - oropharynx exam WNL  - S&S showed no issues with mechanics for swallowing but pt had episode of emesis  - c/w IVF for now - GI consulted for dysphagia - will obtain upper GI series and esophagram to r/o obstruction  - c/w zofran PRN for nausea and protonix - reported dysphagia to solids and liquids (except for water)  - patient has had intermittent episode of emesis  - oropharynx exam WNL  - S&S showed no issues with mechanics for swallowing but pt had episode of emesis  - c/w IVF for now - GI consulted for dysphagia - EGD today  - c/w zofran PRN for nausea and protonix

## 2018-04-19 NOTE — PROGRESS NOTE BEHAVIORAL HEALTH - SUMMARY
Patient is a 21 y/o male from [facility], immigrated to [place] 3.5 y/a, employed as a [employer] , single, childless, domiciled with aunt, sister and her , with no significant PMHx or PPHx, no history of psychiatric hospitalizations, and no history of suicidality, admitted with seizure-like activity. Psychiatry consulted due to concerns regarding conversion disorder.    On exam, pt states his condition has somewhat improved, but continues to endorse burning substernal chest pain, N/V, generalized fatigue and weakness, dyspnea on exertion, L facial droop, and difficulty opening mouth and swallowing.  Pt mood is "less sad" today and his affect is less depressed and constricted than on previous evaluations.  He denies SI/HI and is future oriented and hopeful he will get better.  At present time pt is able to contract for safety and does not require a CO 1:1 for a psychiatric reason (danger to self/others), however would recommend enhanced supervision to monitor behavior, for e.g. possibly self-inducing vomiting.     Plan:  1) Continue standing Ativan 1mg po bid  2) Continue standing Seroquel 50mg po qhs  3) Ativan prns for anxiety/agitation as written  4) Pt has capacity to leave AMA if he chooses to  5) If pt discharged, send home w/ two weeks supply of both standing Ativan and Seroquel; will give pt/family outpatient psych referrals for therapy

## 2018-04-19 NOTE — PROGRESS NOTE BEHAVIORAL HEALTH - NSBHCONSULTMEDSEVERE_PSY_A_CORE FT
Ativan 2mg IV/Im Q6 PRN

## 2018-04-19 NOTE — PROGRESS NOTE BEHAVIORAL HEALTH - NSBHADMITCOUNSEL_PSY_A_CORE
client/family/caregiver education
client/family/caregiver education/risks and benefits of treatment options
instructions for management, treatment and follow up/risk factor reduction/client/family/caregiver education
risks and benefits of treatment options/client/family/caregiver education/instructions for management, treatment and follow up
importance of adherence to chosen treatment/client/family/caregiver education/instructions for management, treatment and follow up/risk factor reduction
risks and benefits of treatment options/client/family/caregiver education/risk factor reduction
risk factor reduction/client/family/caregiver education
importance of adherence to chosen treatment/client/family/caregiver education

## 2018-04-19 NOTE — PROGRESS NOTE ADULT - PROBLEM SELECTOR PLAN 9
- DVT PPx:  IMPROVE score= 1, no pharm  dvt ppx  - regular diet  - PT 4/12 dc home w/ outpatient therapy, again on 4/17/18

## 2018-04-19 NOTE — PROVIDER CONTACT NOTE (OTHER) - BACKGROUND
FS 65  BMP blood glucose 65
Facial Weakness
Facial Weakness
Pt admitted w/ facial droop.
no pertinent medical history

## 2018-04-19 NOTE — PROGRESS NOTE BEHAVIORAL HEALTH - OTHER
laying in bed
unable to assess - lying in bed
unable to assess - sitting in bed
unable to assess - lying in bed

## 2018-04-19 NOTE — PROGRESS NOTE BEHAVIORAL HEALTH - NSBHFUPREASONCONS_PSY_A_CORE
med management/other...
other...
suicidality/other...
other...
other...
suicidality/other...

## 2018-04-19 NOTE — PROGRESS NOTE BEHAVIORAL HEALTH - NS ED [facility] MED ROS GENITOURINARY
No complaints
Yes
No complaints

## 2018-04-19 NOTE — PROGRESS NOTE BEHAVIORAL HEALTH - NSBHCONSFOLLOWNEEDS_PSY_A_CORE
no psychiatric contraindications to discharge
Patient needs further psychiatric safety assessment prior to discharge
no psychiatric contraindications to discharge

## 2018-04-19 NOTE — PROGRESS NOTE ADULT - PROBLEM SELECTOR PLAN 8
- patient noted to have a flat affect during the encounter  - answers questions slowly, and unwilling to participate today   - physical exam findings do not correlate with organic physiology  - psych input appreciated- c/w Ativan 1mg BID anxiety c/w Seroquel 50mg qhs
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
## 2018-04-19 NOTE — PROGRESS NOTE ADULT - SUBJECTIVE AND OBJECTIVE BOX
CONTACT INFO:  Amara Marley MD  Internal Medicine PGY1  Pager:  [phone]/ [phone]    M-F 7am-7pm:  pager covered by day team     *Academic conferences M-F 8am-9am & 12pm-1pm - page ONLY if urgent or if consultant  M-F & Sa-Sun 7pm-7am:  NS  page 1443 for Teams 1-3, 1446 for Team 4 & Care Model/ [facility] Page [id number] for T 1-3 and 22113 for T4 &CM  Sa-Sun 7am-12pm:  see patient chart, primary physician assigned available 7am-12pm  Sa-Sun 12pm-7pm:  NS  page 1443 for Teams 1-4, [facility]  page Covering Resident    BRITNEY GARCIA  22y  Male    Patient is a 22y old  Male who presents with a chief complaint of Facial droop, Seizure-like activity (14 Apr 2018 12:20)      INTERVAL HPI / OVERNIGHT EVENTS:      OBJECTIVE:  Telemetry (24 Hrs):     Vitals Signs (24 Hrs):  T(C): 36.6 (04-18-18 @ 20:27), Max: 36.8 (04-18-18 @ 13:57)  HR: 90 (04-19-18 @ 05:32) (78 - 90)  BP: 111/68 (04-19-18 @ 05:32) (111/68 - 145/83)  RR: 18 (04-19-18 @ 05:32) (17 - 18)  SpO2: 100% (04-19-18 @ 05:32) (98% - 100%)    PHYSICAL EXAM:  General: Comfortable, no apparent distress  HEENT: Atraumatic; EOMI, PERRLA, conjunctiva and sclera clear; no tonsillar erythema/exudates/enlargement  Neck: Supple; no JVD; thyroid normal without masses or enlargement  Chest/Lungs: Clear to auscultation B/L; no rales, rhonchi or wheezing  Heart: Regular rate and rhythm; normal S1/S2; no murmurs, rubs, or gallops  Abdomen: Soft, nontender, nondistended; bowel sounds present  Extremities: PT/DP pulses 2+ B/L; no edema  Skin: No rashes or lesions  Neurological: Alert and oriented to person/place/time    LABS:                        16.3   6.10  )-----------( 222      ( 19 Apr 2018 05:28 )             47.8     04-19    139  |  102  |  13  ----------------------------<  98  3.6   |  24  |  1.07    Ca    9.0      19 Apr 2018 05:28  Phos  2.9     04-19  Mg     2.0     04-19      PT/INR - ( 19 Apr 2018 05:28 )   PT: 13.7 SEC;   INR: 1.23          PTT - ( 19 Apr 2018 05:28 )  PTT:34.1 SEC    CAPILLARY BLOOD GLUCOSE      POCT Blood Glucose.: 113 mg/dL (19 Apr 2018 07:08)  POCT Blood Glucose.: 90 mg/dL (18 Apr 2018 22:38)  POCT Blood Glucose.: 84 mg/dL (18 Apr 2018 16:59)  POCT Blood Glucose.: 74 mg/dL (18 Apr 2018 12:47)          RADIOLOGY & ADDITIONAL TESTS:      MEDICATIONS:  acetaminophen   Tablet. 650 milliGRAM(s) Oral every 6 hours PRN Mild Pain (1 - 3)  aluminum hydroxide/magnesium hydroxide/simethicone Suspension 30 milliLiter(s) Oral every 6 hours PRN Dyspepsia  artificial  tears Solution 1 Drop(s) Both EYES two times a day  artificial tears (preservative free) Ophthalmic Solution 1 Drop(s) Right EYE four times a day  dextrose 5% + lactated ringers. 1000 milliLiter(s) (75 mL/Hr) IV Continuous <Continuous>  famotidine   Suspension 20 milliGRAM(s) Oral two times a day  ibuprofen  Tablet 400 milliGRAM(s) Oral every 8 hours PRN moderate to severe pain  lidocaine   Patch 1 Patch Transdermal daily  lidocaine 1% (Preservative-free) Injectable 10 milliLiter(s) Local Injection once  LORazepam     Tablet 0.5 milliGRAM(s) Oral two times a day  LORazepam     Tablet 1 milliGRAM(s) Oral every 6 hours PRN Anxiety  LORazepam   Injectable 0.5 milliGRAM(s) IV Push every 6 hours PRN severe agitation  ondansetron   Disintegrating Tablet 4 milliGRAM(s) Oral three times a day PRN Nausea and/or Vomiting  pantoprazole   Suspension 40 milliGRAM(s) Oral before breakfast  QUEtiapine 50 milliGRAM(s) Oral at bedtime      ALLERGIES:  No Known Allergies CONTACT INFO:  Amara Marley MD  Internal Medicine PGY1  Pager:  [phone]/ [phone]    M-F 7am-7pm:  pager covered by day team     *Academic conferences M-F 8am-9am & 12pm-1pm - page ONLY if urgent or if consultant  M-F & Sa-Sun 7pm-7am:  NS  page 1443 for Teams 1-3, 1446 for Team 4 & Care Model/ [facility] Page [id number] for T 1-3 and 21774 for T4 &CM  Sa-Sun 7am-12pm:  see patient chart, primary physician assigned available 7am-12pm  Sa-Sun 12pm-7pm:  NS  page 1443 for Teams 1-4, [facility]  page Covering Resident    BRITNEY GARCIA  22y  Male    Patient is a 22y old  Male who presents with a chief complaint of Facial droop, Seizure-like activity (14 Apr 2018 12:20)      INTERVAL HPI / OVERNIGHT EVENTS: NAEON, pt. continues to have right side posterior ear pain. Pt has no physical signs for source of pain. Pt. family at bedside was spoken with extensively this AM regarding all the extensive medical workup so far and lack of organic pathology. Pt. family conveyed there fraustration due to lack of solution and initially very adament about having patient transferred to another facility. Pt. family was informed that MRI head/ CT head/ LP/ and bloodwork has so far showed no s/s of infection.        OBJECTIVE:  Telemetry (24 Hrs):     Vitals Signs (24 Hrs):  T(C): 36.6 (04-18-18 @ 20:27), Max: 36.8 (04-18-18 @ 13:57)  HR: 90 (04-19-18 @ 05:32) (78 - 90)  BP: 111/68 (04-19-18 @ 05:32) (111/68 - 145/83)  RR: 18 (04-19-18 @ 05:32) (17 - 18)  SpO2: 100% (04-19-18 @ 05:32) (98% - 100%)    PHYSICAL EXAM:  General: Comfortable, no apparent distress  HEENT: Atraumatic; EOMI, PERRLA, conjunctiva and sclera clear; no tonsillar erythema/exudates/enlargement  Neck: Supple; no JVD; thyroid normal without masses or enlargement  Chest/Lungs: Clear to auscultation B/L; no rales, rhonchi or wheezing  Heart: Regular rate and rhythm; normal S1/S2; no murmurs, rubs, or gallops  Abdomen: Soft, nontender, nondistended; bowel sounds present  Extremities: PT/DP pulses 2+ B/L; no edema  Skin: No rashes or lesions  Neurological: Alert and oriented to person/place/time    LABS:                        16.3   6.10  )-----------( 222      ( 19 Apr 2018 05:28 )             47.8     04-19    139  |  102  |  13  ----------------------------<  98  3.6   |  24  |  1.07    Ca    9.0      19 Apr 2018 05:28  Phos  2.9     04-19  Mg     2.0     04-19      PT/INR - ( 19 Apr 2018 05:28 )   PT: 13.7 SEC;   INR: 1.23          PTT - ( 19 Apr 2018 05:28 )  PTT:34.1 SEC    CAPILLARY BLOOD GLUCOSE      POCT Blood Glucose.: 113 mg/dL (19 Apr 2018 07:08)  POCT Blood Glucose.: 90 mg/dL (18 Apr 2018 22:38)  POCT Blood Glucose.: 84 mg/dL (18 Apr 2018 16:59)  POCT Blood Glucose.: 74 mg/dL (18 Apr 2018 12:47)          RADIOLOGY & ADDITIONAL TESTS:      MEDICATIONS:  acetaminophen   Tablet. 650 milliGRAM(s) Oral every 6 hours PRN Mild Pain (1 - 3)  aluminum hydroxide/magnesium hydroxide/simethicone Suspension 30 milliLiter(s) Oral every 6 hours PRN Dyspepsia  artificial  tears Solution 1 Drop(s) Both EYES two times a day  artificial tears (preservative free) Ophthalmic Solution 1 Drop(s) Right EYE four times a day  dextrose 5% + lactated ringers. 1000 milliLiter(s) (75 mL/Hr) IV Continuous <Continuous>  famotidine   Suspension 20 milliGRAM(s) Oral two times a day  ibuprofen  Tablet 400 milliGRAM(s) Oral every 8 hours PRN moderate to severe pain  lidocaine   Patch 1 Patch Transdermal daily  lidocaine 1% (Preservative-free) Injectable 10 milliLiter(s) Local Injection once  LORazepam     Tablet 0.5 milliGRAM(s) Oral two times a day  LORazepam     Tablet 1 milliGRAM(s) Oral every 6 hours PRN Anxiety  LORazepam   Injectable 0.5 milliGRAM(s) IV Push every 6 hours PRN severe agitation  ondansetron   Disintegrating Tablet 4 milliGRAM(s) Oral three times a day PRN Nausea and/or Vomiting  pantoprazole   Suspension 40 milliGRAM(s) Oral before breakfast  QUEtiapine 50 milliGRAM(s) Oral at bedtime      ALLERGIES:  No Known Allergies CONTACT INFO:  Amara Marley MD  Internal Medicine PGY1  Pager:  [phone]/ [phone]    M-F 7am-7pm:  pager covered by day team     *Academic conferences M-F 8am-9am & 12pm-1pm - page ONLY if urgent or if consultant  M-F & Sa-Sun 7pm-7am:  NS  page 1443 for Teams 1-3, 1446 for Team 4 & Care Model/ [facility] Page [id number] for T 1-3 and 68100 for T4 &CM  Sa-Sun 7am-12pm:  see patient chart, primary physician assigned available 7am-12pm  Sa-Sun 12pm-7pm:  NS  page 1443 for Teams 1-4, [facility]  page Covering Resident    BRITNEY GARCIA  22y  Male    Patient is a 22y old  Male who presents with a chief complaint of Facial droop, Seizure-like activity (14 Apr 2018 12:20)      INTERVAL HPI / OVERNIGHT EVENTS: NAEON, pt. continues to have right side posterior ear pain. Pt has no physical signs for source of pain. Pt. family at bedside was spoken with extensively this AM regarding all the extensive medical workup so far and lack of organic pathology. Pt. family conveyed there fraustration due to lack of solution and initially very adament about having patient transferred to another facility. Pt. family was informed that MRI head/ CT head/ LP/ and bloodwork has so far showed no s/s of infection.        OBJECTIVE:  Telemetry (24 Hrs):     Vitals Signs (24 Hrs):  T(C): 36.6 (04-18-18 @ 20:27), Max: 36.8 (04-18-18 @ 13:57)  HR: 90 (04-19-18 @ 05:32) (78 - 90)  BP: 111/68 (04-19-18 @ 05:32) (111/68 - 145/83)  RR: 18 (04-19-18 @ 05:32) (17 - 18)  SpO2: 100% (04-19-18 @ 05:32) (98% - 100%)    PHYSICAL EXAM:  GENERAL: NAD, well-developed  HEAD:  Atraumatic, Normocephalic  EYES: EOMI, PERRLA, conjunctiva and sclera anicteric  NECK: Supple, No JVD  CHEST/LUNG: Clear to auscultation bilaterally; No wheeze  HEART: Regular rate and rhythm; No murmurs, rubs, or gallops  ABDOMEN: Soft, Nontender, Nondistended; Bowel sounds present, no hepatosplenomegaly, no rebound or guarding  EXTREMITIES:  2+ Peripheral Pulses, No clubbing, cyanosis, or edema  PSYCH: AAOx3  NEUROLOGY: L facial droop  SKIN: No rashes or lesion    LABS:                        16.3   6.10  )-----------( 222      ( 19 Apr 2018 05:28 )             47.8     04-19    139  |  102  |  13  ----------------------------<  98  3.6   |  24  |  1.07    Ca    9.0      19 Apr 2018 05:28  Phos  2.9     04-19  Mg     2.0     04-19      PT/INR - ( 19 Apr 2018 05:28 )   PT: 13.7 SEC;   INR: 1.23          PTT - ( 19 Apr 2018 05:28 )  PTT:34.1 SEC    CAPILLARY BLOOD GLUCOSE      POCT Blood Glucose.: 113 mg/dL (19 Apr 2018 07:08)  POCT Blood Glucose.: 90 mg/dL (18 Apr 2018 22:38)  POCT Blood Glucose.: 84 mg/dL (18 Apr 2018 16:59)  POCT Blood Glucose.: 74 mg/dL (18 Apr 2018 12:47)          RADIOLOGY & ADDITIONAL TESTS:      MEDICATIONS:  acetaminophen   Tablet. 650 milliGRAM(s) Oral every 6 hours PRN Mild Pain (1 - 3)  aluminum hydroxide/magnesium hydroxide/simethicone Suspension 30 milliLiter(s) Oral every 6 hours PRN Dyspepsia  artificial  tears Solution 1 Drop(s) Both EYES two times a day  artificial tears (preservative free) Ophthalmic Solution 1 Drop(s) Right EYE four times a day  dextrose 5% + lactated ringers. 1000 milliLiter(s) (75 mL/Hr) IV Continuous <Continuous>  famotidine   Suspension 20 milliGRAM(s) Oral two times a day  ibuprofen  Tablet 400 milliGRAM(s) Oral every 8 hours PRN moderate to severe pain  lidocaine   Patch 1 Patch Transdermal daily  lidocaine 1% (Preservative-free) Injectable 10 milliLiter(s) Local Injection once  LORazepam     Tablet 0.5 milliGRAM(s) Oral two times a day  LORazepam     Tablet 1 milliGRAM(s) Oral every 6 hours PRN Anxiety  LORazepam   Injectable 0.5 milliGRAM(s) IV Push every 6 hours PRN severe agitation  ondansetron   Disintegrating Tablet 4 milliGRAM(s) Oral three times a day PRN Nausea and/or Vomiting  pantoprazole   Suspension 40 milliGRAM(s) Oral before breakfast  QUEtiapine 50 milliGRAM(s) Oral at bedtime      ALLERGIES:  No Known Allergies CONTACT INFO:  Amara Marley MD  Internal Medicine PGY1  Pager:  [phone]/ [phone]    M-F 7am-7pm:  pager covered by day team     *Academic conferences M-F 8am-9am & 12pm-1pm - page ONLY if urgent or if consultant  M-F & Sa-Sun 7pm-7am:  NS  page 1443 for Teams 1-3, 1446 for Team 4 & Care Model/ [facility] Page [id number] for T 1-3 and 17588 for T4 &CM  Sa-Sun 7am-12pm:  see patient chart, primary physician assigned available 7am-12pm  Sa-Sun 12pm-7pm:  NS  page 1443 for Teams 1-4, [facility]  page Covering Resident    BRITNEY GARCIA  22y  Male    Patient is a 22y old  Male who presents with a chief complaint of Facial droop, Seizure-like activity (14 Apr 2018 12:20)      INTERVAL HPI / OVERNIGHT EVENTS: NAEON, pt. continues to have right side posterior ear pain. Pt has no physical signs for source of pain. Pt. family at bedside was spoken with extensively this AM regarding all the extensive medical workup so far and lack of organic pathology. Pt. family conveyed there fraustration due to lack of solution and initially very adament about having patient transferred to another facility. Pt. family was informed that MRI head/ CT head/ LP/ and bloodwork has so far showed no s/s of infection.        OBJECTIVE:  Telemetry (24 Hrs):     Vitals Signs (24 Hrs):  T(C): 36.6 (04-18-18 @ 20:27), Max: 36.8 (04-18-18 @ 13:57)  HR: 90 (04-19-18 @ 05:32) (78 - 90)  BP: 111/68 (04-19-18 @ 05:32) (111/68 - 145/83)  RR: 18 (04-19-18 @ 05:32) (17 - 18)  SpO2: 100% (04-19-18 @ 05:32) (98% - 100%)    PHYSICAL EXAM:  GENERAL: NAD, well-developed  HEAD:  Atraumatic, Normocephalic  EYES: EOMI, PERRLA, conjunctiva and sclera anicteric  NECK: Supple, No JVD  CHEST/LUNG: Clear to auscultation bilaterally; No wheeze  HEART: Regular rate and rhythm; No murmurs, rubs, or gallops  ABDOMEN: Soft, Nontender, Nondistended; Bowel sounds present, no hepatosplenomegaly, no rebound or guarding  EXTREMITIES:  2+ Peripheral Pulses, No clubbing, cyanosis, or edema  PSYCH: AAOx3  NEUROLOGY: L facial droop  SKIN: No rashes or lesion    LABS:                        16.3   6.10  )-----------( 222      ( 19 Apr 2018 05:28 )             47.8     04-19    139  |  102  |  13  ----------------------------<  98  3.6   |  24  |  1.07    Ca    9.0      19 Apr 2018 05:28  Phos  2.9     04-19  Mg     2.0     04-19      PT/INR - ( 19 Apr 2018 05:28 )   PT: 13.7 SEC;   INR: 1.23          PTT - ( 19 Apr 2018 05:28 )  PTT:34.1 SEC    CAPILLARY BLOOD GLUCOSE      POCT Blood Glucose.: 113 mg/dL (19 Apr 2018 07:08)  POCT Blood Glucose.: 90 mg/dL (18 Apr 2018 22:38)  POCT Blood Glucose.: 84 mg/dL (18 Apr 2018 16:59)  POCT Blood Glucose.: 74 mg/dL (18 Apr 2018 12:47)          RADIOLOGY & ADDITIONAL TESTS:      MEDICATIONS:  acetaminophen   Tablet. 650 milliGRAM(s) Oral every 6 hours PRN Mild Pain (1 - 3)  aluminum hydroxide/magnesium hydroxide/simethicone Suspension 30 milliLiter(s) Oral every 6 hours PRN Dyspepsia  artificial  tears Solution 1 Drop(s) Both EYES two times a day  artificial tears (preservative free) Ophthalmic Solution 1 Drop(s) Right EYE four times a day  dextrose 5% + lactated ringers. 1000 milliLiter(s) (75 mL/Hr) IV Continuous <Continuous>  famotidine   Suspension 20 milliGRAM(s) Oral two times a day  ibuprofen  Tablet 400 milliGRAM(s) Oral every 8 hours PRN moderate to severe pain  lidocaine   Patch 1 Patch Transdermal daily  lidocaine 1% (Preservative-free) Injectable 10 milliLiter(s) Local Injection once  LORazepam     Tablet 0.5 milliGRAM(s) Oral two times a day  LORazepam     Tablet 1 milliGRAM(s) Oral every 6 hours PRN Anxiety  LORazepam   Injectable 0.5 milliGRAM(s) IV Push every 6 hours PRN severe agitation  ondansetron   Disintegrating Tablet 4 milliGRAM(s) Oral three times a day PRN Nausea and/or Vomiting  pantoprazole   Suspension 40 milliGRAM(s) Oral before breakfast  QUEtiapine 50 milliGRAM(s) Oral at bedtime      ALLERGIES:  No Known Allergies    Case discussed: psychiatry and GI

## 2018-04-19 NOTE — PROGRESS NOTE ADULT - NSHPATTENDINGPLANDISCUSS_GEN_ALL_CORE
patient and neurology and medical teams.
patient, family at bedside and neurology and medical teams.
patient
Dr. Christy
Patient, HS2
Dr. Christy
Patient, HS2
Patient, HS2
Patient, HS2, Family

## 2018-04-19 NOTE — PROGRESS NOTE BEHAVIORAL HEALTH - AXIS III
Bell's palsy, Seizures

## 2018-04-19 NOTE — PROGRESS NOTE ADULT - PROBLEM SELECTOR PLAN 3
- Pt few days ago stated he has the headache he wants to kill himself, no plan, Pt. repeated that during pain he feels like hurting self or others, but no actual plan   psych reccs appreciated enhanced supervision, - c/w ibuprofen, tylenol, lidocaine patch, maalox, PPI  - Pt has no clear organic etiology for N/V, difficulty with swallowing, chest pain/ now jaw pain - patient concerns are inconsistent with physical findings, patient complains of weakness but seen to ambulate, complains of dysphagia but seen eating - VSS during episodes of pain - patient does have episodes of emesis  - appreciate psych reccs  - no longer concern for SI during recent questioning - has capacity to leave AMA

## 2018-04-19 NOTE — PROGRESS NOTE BEHAVIORAL HEALTH - PRIMARY DX
Adjustment disorder, unspecified type

## 2018-04-19 NOTE — PROVIDER CONTACT NOTE (OTHER) - ASSESSMENT
Pt refusing juice or any supplemental drinks. Pt ate several bites of cake, became nausea but no emesis noted. PO Zofran administered.
Pt refusing all food or drink. Calorie count in place- pt refused all meals during the day 4/16/18.
Pain level 8/10
Pt laying in bed with eyes closed.
patient is tachypneic and complaining of chest pain  of 9,  short of breath  and nausea. V/S taken 118/79 HR 67 RR 22 100% on RA MD notified

## 2018-04-19 NOTE — PROGRESS NOTE ADULT - ASSESSMENT
22M with no PMHx who presents to the ED with L-sided facial droop possibly 2/2 Bell's Palsy vs conversion disorder, also with recent seizure-like activity concerning for epilepsy vs psychogenic non epileptic seizure, s/p 7 day tx for Ideal Palsy, with normal MRI now s/p LP, with chest pain and SOB. All symptoms are likely in the setting of conversion disorder and unlikely organic in nature.

## 2018-04-19 NOTE — PROGRESS NOTE BEHAVIORAL HEALTH - NSBHCONSULTMEDANXIETY_PSY_A_CORE FT
Ativan 0.5mg PO q6h PRN

## 2018-04-19 NOTE — PROGRESS NOTE BEHAVIORAL HEALTH - NSBHADMITCOORDWITH_PSY_A_CORE
medical staff
medical staff/family/Caregiver
medical staff
medical staff/family/Caregiver
medical staff
medical staff

## 2018-04-19 NOTE — PROGRESS NOTE ADULT - PROBLEM SELECTOR PLAN 2
-Patient with no po intake yesterday and minimal PO intake today  -I walked patient and he requires assistance, but PT recs home with home PT  -As patient is not eating and is hypoglycemic, will start IVF, but c/w oral medications as he can tolerate pills -Patient with no po intake yesterday and minimal PO intake today  - PT recs home with home PT  -As patient is not eating and is hypoglycemic, will start IVF, but c/w oral medications as he can tolerate pills

## 2018-04-19 NOTE — PROGRESS NOTE BEHAVIORAL HEALTH - NSBHFUPINTERVALCCFT_PSY_A_CORE
"I want to go home."
"I cannot open my mouth."
"I have a lot of pain in my chest."
"I want to transfer to another hospital."
"The pain is too much, I think I am going to kill myself."
"When I talk for more than five minutes, it become hard to breathe."
"I could not sleep because of the pain."
"I am feeling a bit better today."

## 2018-04-20 LAB — MISCELLANEOUS - CHEM: SIGNIFICANT CHANGE UP

## 2018-04-26 ENCOUNTER — OUTPATIENT (OUTPATIENT)
Dept: OUTPATIENT SERVICES | Facility: HOSPITAL | Age: 23
LOS: 1 days | Discharge: STILL ADMITTED OR ADMINISTRATIVE | End: 2018-04-26

## 2018-04-26 PROCEDURE — 90792 PSYCH DIAG EVAL W/MED SRVCS: CPT

## 2018-04-27 DIAGNOSIS — F44.5 CONVERSION DISORDER WITH SEIZURES OR CONVULSIONS: ICD-10-CM

## 2018-04-27 DIAGNOSIS — F32.9 MAJOR DEPRESSIVE DISORDER, SINGLE EPISODE, UNSPECIFIED: ICD-10-CM

## 2018-04-27 DIAGNOSIS — F44.9 DISSOCIATIVE AND CONVERSION DISORDER, UNSPECIFIED: ICD-10-CM

## 2018-05-04 PROCEDURE — 99214 OFFICE O/P EST MOD 30 MIN: CPT

## 2018-05-09 ENCOUNTER — OUTPATIENT (OUTPATIENT)
Dept: OUTPATIENT SERVICES | Facility: HOSPITAL | Age: 23
LOS: 1 days | Discharge: HOME | End: 2018-05-09
Payer: MEDICAID

## 2018-05-09 PROCEDURE — 90792 PSYCH DIAG EVAL W/MED SRVCS: CPT

## 2018-05-10 DIAGNOSIS — F43.10 POST-TRAUMATIC STRESS DISORDER, UNSPECIFIED: ICD-10-CM

## 2018-05-10 DIAGNOSIS — F44.5 CONVERSION DISORDER WITH SEIZURES OR CONVULSIONS: ICD-10-CM

## 2018-05-10 DIAGNOSIS — F32.9 MAJOR DEPRESSIVE DISORDER, SINGLE EPISODE, UNSPECIFIED: ICD-10-CM

## 2018-05-11 LAB — FUNGUS SPEC QL CULT: SIGNIFICANT CHANGE UP

## 2018-05-16 PROCEDURE — 90836 PSYTX W PT W E/M 45 MIN: CPT

## 2018-05-16 PROCEDURE — 99213 OFFICE O/P EST LOW 20 MIN: CPT

## 2018-05-23 PROBLEM — Z00.00 ENCOUNTER FOR PREVENTIVE HEALTH EXAMINATION: Status: ACTIVE | Noted: 2018-05-23

## 2018-05-24 ENCOUNTER — EMERGENCY (EMERGENCY)
Facility: HOSPITAL | Age: 23
LOS: 1 days | Discharge: HOME | End: 2018-05-24
Attending: EMERGENCY MEDICINE | Admitting: EMERGENCY MEDICINE
Payer: MEDICAID

## 2018-05-24 VITALS
TEMPERATURE: 98 F | RESPIRATION RATE: 18 BRPM | OXYGEN SATURATION: 100 % | HEART RATE: 65 BPM | SYSTOLIC BLOOD PRESSURE: 128 MMHG | DIASTOLIC BLOOD PRESSURE: 63 MMHG

## 2018-05-24 PROCEDURE — 99283 EMERGENCY DEPT VISIT LOW MDM: CPT

## 2018-05-24 RX ORDER — OMEPRAZOLE 10 MG/1
1 CAPSULE, DELAYED RELEASE ORAL
Qty: 28 | Refills: 0 | OUTPATIENT
Start: 2018-05-24 | End: 2018-06-06

## 2018-05-24 RX ORDER — AMOXICILLIN 250 MG/5ML
2 SUSPENSION, RECONSTITUTED, ORAL (ML) ORAL
Qty: 56 | Refills: 0 | OUTPATIENT
Start: 2018-05-24 | End: 2018-06-06

## 2018-05-24 RX ORDER — CLARITHROMYCIN 500 MG
1 TABLET ORAL
Qty: 28 | Refills: 0 | OUTPATIENT
Start: 2018-05-24 | End: 2018-06-06

## 2018-05-24 NOTE — ED PROVIDER NOTE - OBJECTIVE STATEMENT
21 y/o M w/ no significant PMhx, presents to the ED for medication prescription. Pt was seen in ED earlier this month and received an email today from [facility] Department of Medicine stating his biopsy from EGD showed H. pylori. Pt tried called the phone number on the email, but was not able to get an appt. Denies fever, chills or any other complaints.

## 2018-05-24 NOTE — ED ADULT TRIAGE NOTE - CHIEF COMPLAINT QUOTE
[facility] from [facility] showed that he has h. pylori. states tried calling the number back but did not have his information on file. c/o epigatsric pain

## 2018-05-24 NOTE — ED PROVIDER NOTE - MEDICAL DECISION MAKING DETAILS
23 y/o M w/ positive H. Pylori test and requesting medication. Will send medication as stated by Dr. Conrad in email, but explained to pt he must call and follow up w/ Dr. Conrad. 21 y/o M w/ positive H. Pylori test and requesting medication. Will send medication as stated by Dr. Ayala in email, but explained to pt he must call and follow up w/ Dr. Conrad.

## 2018-06-06 PROCEDURE — 99213 OFFICE O/P EST LOW 20 MIN: CPT

## 2018-08-20 NOTE — ED PEDIATRIC TRIAGE NOTE - CHIEF COMPLAINT QUOTE
family reports pt with worsening condition x 4d, recent dx of bells palsy, + bilateral sensory responses, pupils equal and round. no

## 2018-08-24 NOTE — PROGRESS NOTE BEHAVIORAL HEALTH - NSBHCHARTREVIEWVS_PSY_A_CORE FT
Vital Signs Last 24 Hrs  T(C): 36.6 (19 Apr 2018 10:35), Max: 36.8 (18 Apr 2018 13:57)  T(F): 97.9 (19 Apr 2018 10:35), Max: 98.2 (18 Apr 2018 13:57)  HR: 68 (19 Apr 2018 10:35) (68 - 90)  BP: 107/68 (19 Apr 2018 10:35) (107/68 - 145/83)  BP(mean): --  RR: 18 (19 Apr 2018 10:35) (17 - 18)  SpO2: 99% (19 Apr 2018 10:35) (98% - 100%)
monitor
Vital Signs Last 24 Hrs  T(C): 36.3 (12 Apr 2018 05:23), Max: 36.4 (11 Apr 2018 21:42)  T(F): 97.4 (12 Apr 2018 05:23), Max: 97.5 (11 Apr 2018 21:42)  HR: 73 (12 Apr 2018 05:23) (73 - 78)  BP: 98/60 (12 Apr 2018 05:23) (98/60 - 113/71)  BP(mean): --  RR: 18 (12 Apr 2018 05:23) (18 - 18)  SpO2: 96% (12 Apr 2018 05:23) (96% - 98%)
Vital Signs Last 24 Hrs  T(C): 36.4 (17 Apr 2018 05:37), Max: 36.9 (16 Apr 2018 22:27)  T(F): 97.5 (17 Apr 2018 05:37), Max: 98.5 (16 Apr 2018 22:27)  HR: 75 (17 Apr 2018 11:26) (60 - 89)  BP: 98/60 (17 Apr 2018 11:26) (95/56 - 110/73)  BP(mean): --  RR: 17 (17 Apr 2018 11:26) (17 - 18)  SpO2: 100% (17 Apr 2018 11:26) (96% - 100%)
Vital Signs Last 24 Hrs  T(C): 36.8 (18 Apr 2018 13:57), Max: 36.8 (18 Apr 2018 13:57)  T(F): 98.2 (18 Apr 2018 13:57), Max: 98.2 (18 Apr 2018 13:57)  HR: 90 (18 Apr 2018 13:57) (73 - 90)  BP: 145/83 (18 Apr 2018 13:57) (107/79 - 145/83)  BP(mean): --  RR: 17 (18 Apr 2018 13:57) (17 - 17)  SpO2: 99% (18 Apr 2018 13:57) (98% - 100%)
Vital Signs Last 24 Hrs  T(C): 36.9 (13 Apr 2018 12:37), Max: 36.9 (13 Apr 2018 06:14)  T(F): 98.4 (13 Apr 2018 12:37), Max: 98.4 (13 Apr 2018 06:14)  HR: 81 (13 Apr 2018 12:37) (53 - 82)  BP: 100/88 (13 Apr 2018 12:37) (100/60 - 118/79)  BP(mean): --  RR: 20 (13 Apr 2018 12:37) (18 - 22)  SpO2: 99% (13 Apr 2018 12:37) (99% - 100%)
Vital Signs Last 24 Hrs  T(C): 36.8 (16 Apr 2018 05:45), Max: 36.8 (16 Apr 2018 05:45)  T(F): 98.3 (16 Apr 2018 05:45), Max: 98.3 (16 Apr 2018 05:45)  HR: 64 (16 Apr 2018 05:45) (64 - 75)  BP: 104/67 (16 Apr 2018 09:00) (90/52 - 104/67)  BP(mean): --  RR: 18 (16 Apr 2018 05:45) (18 - 18)  SpO2: 98% (16 Apr 2018 05:45) (96% - 100%)
Vital Signs Last 24 Hrs  T(C): 36.3 (10 Apr 2018 15:33), Max: 36.4 (10 Apr 2018 05:50)  T(F): 97.4 (10 Apr 2018 15:33), Max: 97.6 (10 Apr 2018 05:50)  HR: 60 (10 Apr 2018 15:33) (60 - 73)  BP: 111/71 (10 Apr 2018 15:33) (110/76 - 125/75)  BP(mean): --  RR: 18 (10 Apr 2018 15:33) (16 - 18)  SpO2: 98% (10 Apr 2018 15:33) (98% - 100%)
Vital Signs Last 24 Hrs  T(C): 36.4 (11 Apr 2018 14:11), Max: 37 (10 Apr 2018 21:33)  T(F): 97.5 (11 Apr 2018 14:11), Max: 98.6 (10 Apr 2018 21:33)  HR: 54 (11 Apr 2018 14:11) (54 - 65)  BP: 110/66 (11 Apr 2018 14:11) (110/66 - 114/76)  BP(mean): --  RR: 18 (11 Apr 2018 14:11) (18 - 18)  SpO2: 100% (11 Apr 2018 14:11) (98% - 100%)

## 2018-10-14 ENCOUNTER — EMERGENCY (EMERGENCY)
Facility: HOSPITAL | Age: 23
LOS: 1 days | Discharge: HOME | End: 2018-10-14
Admitting: EMERGENCY MEDICINE
Payer: MEDICAID

## 2018-10-14 VITALS
TEMPERATURE: 99 F | HEART RATE: 99 BPM | SYSTOLIC BLOOD PRESSURE: 126 MMHG | RESPIRATION RATE: 16 BRPM | OXYGEN SATURATION: 100 % | DIASTOLIC BLOOD PRESSURE: 76 MMHG

## 2018-10-14 PROCEDURE — 99283 EMERGENCY DEPT VISIT LOW MDM: CPT | Mod: 25

## 2018-10-14 RX ORDER — DIPHENHYDRAMINE HCL 50 MG
25 CAPSULE ORAL ONCE
Qty: 0 | Refills: 0 | Status: COMPLETED | OUTPATIENT
Start: 2018-10-14 | End: 2018-10-14

## 2018-10-14 RX ORDER — FAMOTIDINE 10 MG/ML
20 INJECTION INTRAVENOUS ONCE
Qty: 0 | Refills: 0 | Status: COMPLETED | OUTPATIENT
Start: 2018-10-14 | End: 2018-10-14

## 2018-10-14 RX ADMIN — FAMOTIDINE 20 MILLIGRAM(S): 10 INJECTION INTRAVENOUS at 23:49

## 2018-10-14 RX ADMIN — Medication 25 MILLIGRAM(S): at 23:49

## 2018-10-14 RX ADMIN — Medication 40 MILLIGRAM(S): at 23:49

## 2018-10-14 NOTE — ED PROVIDER NOTE - OBJECTIVE STATEMENT
23 y/o M with h/o H. Pylori infection prescribed medications 1 month ago (amox, clarithro, pantoprazole) but stopped was just recently restarted on lansoprazole 3 days ago (NO Abx). States he started with hives approx 5pm today - very itchy. Denies throat tightness/closure, chest pain, shortness of breath, wheezing, nausea, vomiting. Appears well. Denies prior episodes. States he also did eat peanuts this morning but has had peanuts in the past and tolerated well. 21 y/o M with h/o H. Pylori infection prescribed medications 1 month ago (amox, clarithro, pantoprazole) but stopped was just recently restarted on lansoprazole 3 days ago (NO Abx). States he started with hives approx 5pm today - very itchy. Denies throat tightness/closure, chest pain, shortness of breath, wheezing, nausea, vomiting. Appears well. Denies prior episodes. States he also did eat peanuts this morning but has had peanuts in the past and tolerated well. No known food allergies, no known drug allergies.

## 2018-10-14 NOTE — ED PROVIDER NOTE - PROGRESS NOTE DETAILS
JEFFERY Wiley Addendum------  This patient was signed out to me by JEFFERY Dahl at 23:55 hrs; pt rec'd Benadryl/Pepcid/prednisone medication combo for tx of Dx: Allergic Reaction.  Pt was reassessed by me; pt verbalizes improvement of rash and itching.  Pt. states he feels well for discharge home.  Pt. will be d/c'd per below instructions as had been anticipatorily discussed with JEFFERY Dahl at sign-out.  Pt. stable for d/c home.

## 2018-10-14 NOTE — ED ADULT TRIAGE NOTE - CHIEF COMPLAINT QUOTE
c/o itchy red rash to entire body since 5pm. denies difficulty breathing. speaking in full sentences. appears comfortable. no known allergies. is taking antibiotic for H pylori (day 5).

## 2018-10-14 NOTE — ED PROVIDER NOTE - MEDICAL DECISION MAKING DETAILS
21 y/o M no known allergies presenting with diffuse rash starting 5pm today no airway involvement- Benadryl, Pepcid, Prednisone, reassess, Allergy outpt testing/follow up

## 2018-10-14 NOTE — ED PROVIDER NOTE - PLAN OF CARE
Follow up with your primary medical doctor and an allergist (allergy doctor) within 2-3 days of ER discharge.  If you need to find a doctor to follow up with, you may call the [facility] Patient Access Services helpline at [phone] to find names/contact #s for a practitioner (or specialist) to follow up with.  Bring your discharge papers / test results with you to your follow up appointment(s).  Rest / no strenuous activity.  Stay well hydrated.  Drink plenty of CLEAR fluids.  MEDICATIONS:  See attached medication sheet(s).  Discontinue lantoprazole as it is a possibility that this may have caused your allergic reaction.  Avoid nuts until assessed by the allergist.  ***Return to the Emergency Department if you experience any new / worsening symptoms or have any problems / concerns.***

## 2018-10-14 NOTE — ED PROVIDER NOTE - CARE PLAN
Principal Discharge DX:	Allergic reaction  Assessment and plan of treatment:	Follow up with your primary medical doctor and an allergist (allergy doctor) within 2-3 days of ER discharge.  If you need to find a doctor to follow up with, you may call the [facility] Patient Access Services helpline at [phone] to find names/contact #s for a practitioner (or specialist) to follow up with.  Bring your discharge papers / test results with you to your follow up appointment(s).  Rest / no strenuous activity.  Stay well hydrated.  Drink plenty of CLEAR fluids.  MEDICATIONS:  See attached medication sheet(s).  Discontinue lantoprazole as it is a possibility that this may have caused your allergic reaction.  Avoid nuts until assessed by the allergist.  ***Return to the Emergency Department if you experience any new / worsening symptoms or have any problems / concerns.***

## 2018-10-15 PROBLEM — A04.8 OTHER SPECIFIED BACTERIAL INTESTINAL INFECTIONS: Chronic | Status: ACTIVE | Noted: 2018-05-24

## 2018-10-15 RX ORDER — DIPHENHYDRAMINE HCL 50 MG
1 CAPSULE ORAL
Qty: 30 | Refills: 0 | OUTPATIENT
Start: 2018-10-15

## 2018-10-15 RX ORDER — FAMOTIDINE 10 MG/ML
1 INJECTION INTRAVENOUS
Qty: 14 | Refills: 0 | OUTPATIENT
Start: 2018-10-15

## 2019-05-31 NOTE — PROGRESS NOTE ADULT - PROBLEM SELECTOR PLAN 6
- patient noted to have a flat affect during the encounter  - answers questions slowly, and unwilling to participate today   - physical exam findings do not correlate with organic physiology  - psych input appreciated- .5 ativan BID standing and .5IV PRN q6 for agitation/ anxiety within normal limits

## 2020-04-21 NOTE — PROGRESS NOTE ADULT - PROBLEM SELECTOR PLAN 5
- patient noted to have a flat affect during the encounter  - answers questions slowly, and unwilling to participate today   - physical exam findings do not correlate with organic physiology  - psych input appreciated- .5 ativan BID standing and .5IV PRN q6 for agitation/ anxiety Patient/Caregiver provided printed discharge information.

## 2020-11-16 NOTE — BEHAVIORAL HEALTH ASSESSMENT NOTE - SUMMARY
[facility]  History & Physical     Subjective:     Chief Complaint/Reason for Admission: abd pain    History of Present Illness:   Patient 61 y.o. male presents for inguinal hernia repair. Pt has been having lower abdominal pain for the last several weeks.  He had ct scan demonstrating fat containing inguinal hernia.  He was seen in the office and d/w pt regarding ing hernia repair.  Pt desires to proceed.  Risks of procedure were d/w pt in detail.      Patient Active Problem List    Diagnosis Date Noted    Bilateral recurrent inguinal hernia without obstruction or gangrene 11/16/2020        Medications Prior to Admission   Medication Sig Dispense Refill Last Dose    zolpidem (AMBIEN) 10 mg Tab TK 1 T PO QD HS FOR REST   11/15/2020 at Unknown time    HYDROcodone-acetaminophen (NORCO) 5-325 mg per tablet Take 1 tablet by mouth every 4 (four) hours as needed for Pain. (Patient not taking: Reported on 9/24/2020) 12 tablet 0 Unknown at Unknown time     Review of patient's allergies indicates:   Allergen Reactions    Penicillins Other (See Comments)     Flushed        Past Medical History:   Diagnosis Date    Kidney stone 2020      Past Surgical History:   Procedure Laterality Date    APPENDECTOMY  24yrs old    CYSTOSCOPY WITH CALCULUS EXTRACTION Left 4/23/2020    Procedure: CYSTOSCOPY, WITH CALCULUS REMOVAL;  Surgeon: Derian Aguillon MD;  Location: [facility] OR;  Service: Urology;  Laterality: Left;    EXTRACORPOREAL SHOCK WAVE LITHOTRIPSY Left 5/7/2020    Procedure: LITHOTRIPSY, ESWL;  Surgeon: Derian Aguillon MD;  Location: [facility] OR;  Service: Urology;  Laterality: Left;    EXTRACORPOREAL SHOCK WAVE LITHOTRIPSY Left 6/4/2020    Procedure: LITHOTRIPSY, ESWL;  Surgeon: Derian Aguillon MD;  Location: [facility] OR;  Service: Urology;  Laterality: Left;    Right Knee Orthoscopic Surgery  2014    Right Shoulder Rotator Cuff  2014    TONSILLECTOMY  Age 34yrs old    URETEROSCOPY N/A 4/23/2020     Procedure: URETEROSCOPY;  Surgeon: Derian Aguillon MD;  Location: [facility];  Service: Urology;  Laterality: N/A;      History reviewed. No pertinent family history.   Social History     Tobacco Use    Smoking status: Never Smoker    Smokeless tobacco: Never Used   Substance Use Topics    Alcohol use: Never     Frequency: Never        Review of Systems:  A comprehensive review of systems was negative.    OBJECTIVE:     Patient Vitals for the past 8 hrs:   BP Temp Temp src Pulse Resp SpO2   11/16/20 0648 132/77 98.2 °F (36.8 °C) Skin 81 18 96 %     AAOx3  CTA B  Sinus  Soft/nt/nd  2+ pulses B    B inguinal hernia      Data Review:      ASSESSMENT/PLAN:     Active Hospital Problems    Diagnosis  POA    Bilateral recurrent inguinal hernia without obstruction or gangrene [K40.21]  Yes      Resolved Hospital Problems   No resolved problems to display.       Plan:  To have ing hernia repair today   Patient is a 21 y/o male from [facility], immigrated to [place] 3.5 y/a, employed as a [employer] , single, childless, domiciled with sister and her , with no significant PMHx or PPHx, no history of psychiatric hospitalizations, and no history of suicidality, admitted with seizure-like activity. Psychiatry consulted due to concerns regarding conversion disorder.    Pt has persistent with one week history of Bell's palsy and new onset seizure like activity. Imaging studies and labs are thus far within normal limits and primary team expresses concerns regarding conversion disorder. As this is a diagnosis of exclusion, we recommend a complete evaluation for an organic etiology, including a video EEG. For concerns regarding pseudoseizures, consider obtaining a prolactin level within 15min of a seizure episode and repeat after 6 hrs. The level should double from baseline in a true seizure. It is worth noting that this test has low specificity and can only be supportive in making a diagnosis of nonepileptic seizure. No mood or psychotic symptoms elicited. Pt does not meet criteria for in-pt psychiatric hospitalization. Patient is a 23 y/o male from [facility], immigrated to [place] 3.5 y/a, employed as a [employer] , single, childless, domiciled with sister and her , with no significant PMHx or PPHx, no history of psychiatric hospitalizations, and no history of suicidality, admitted with seizure-like activity. Psychiatry consulted due to concerns regarding conversion disorder.    Pt presents with one week history of Bell's palsy and new onset seizure like activity. Imaging studies and labs are thus far within normal limits and primary team expresses concerns regarding conversion disorder. As this is a diagnosis of exclusion, we recommend a complete evaluation for an organic etiology, including a video EEG. For concerns regarding pseudoseizures, consider obtaining a prolactin level within 15min of a seizure episode and repeat after 6 hrs. The level should double from baseline in a true seizure. It is worth noting that this test has low specificity and can only be supportive in making a diagnosis of nonepileptic seizure. No mood or psychotic symptoms elicited. Pt does not meet criteria for in-pt psychiatric hospitalization.

## 2022-06-14 NOTE — ED ADULT NURSE NOTE - NEURO GAIT
Rx Refill Note  Requested Prescriptions     Pending Prescriptions Disp Refills   • buPROPion (WELLBUTRIN) 100 MG tablet 180 tablet 0     Sig: Take 1 tablet by mouth 2 (Two) Times a Day.      Last office visit with prescribing clinician: 6/2/2022      Next office visit with prescribing clinician: Visit date not found            Kailyn Mercedes MA  06/14/22, 09:22 EDT   steady

## 2022-08-29 NOTE — PROGRESS NOTE ADULT - PROBLEM/PLAN-4
DISPLAY PLAN FREE TEXT
Instructions (Optional): Patient reports has been present for years with no changes. Discussed monitoring versus shave biopsy. Patient declines and wants to monitor. Size and photo taken for comparison at next FSE.
DISPLAY PLAN FREE TEXT
Detail Level: Detailed
Size Of Lesion: 5mm irregular dark brown patch

## 2023-02-17 NOTE — ED ADULT NURSE NOTE - OBJECTIVE STATEMENT
02/17/23 0903   Team Meeting   Meeting Type Daily Rounds   Team Members Present   Team Members Present Physician;Nurse;   Physician Team Member [facility] Pam   Patient/Family Present   Patient Present No   Patient's Family Present No     Pt reporting he feels as though he has slowed down  Denies symptoms  Discharge pending for Monday   Patient accompanied by family members presents today with complaints of decline in health since one week, As per family, patient has been in and out of hospitals since last week, and deterioration is noted today. On arrival, patient is alert and oriented x4, facial droop noted with slurred speech, no extremities weakness noted, normal sinus rhythm on cardiac monitor, respirations are even and unlabored, 20 gauge saline lock placed on right AC, blood drawn and sent. Will follow up and monitor.

## 2023-08-07 ENCOUNTER — EMERGENCY (EMERGENCY)
Facility: HOSPITAL | Age: 28
LOS: 1 days | Discharge: HOME | End: 2023-08-07
Attending: EMERGENCY MEDICINE
Payer: COMMERCIAL

## 2023-08-07 VITALS
DIASTOLIC BLOOD PRESSURE: 82 MMHG | WEIGHT: 160.06 LBS | HEART RATE: 99 BPM | OXYGEN SATURATION: 100 % | SYSTOLIC BLOOD PRESSURE: 115 MMHG | TEMPERATURE: 98 F | RESPIRATION RATE: 16 BRPM

## 2023-08-07 LAB
ALBUMIN SERPL ELPH-MCNC: 3.2 G/DL — LOW (ref 3.5–5)
ALP SERPL-CCNC: 81 U/L — SIGNIFICANT CHANGE UP (ref 40–120)
ALT FLD-CCNC: 72 U/L DA — HIGH (ref 10–60)
ANION GAP SERPL CALC-SCNC: 4 MMOL/L — LOW (ref 5–17)
AST SERPL-CCNC: 30 U/L — SIGNIFICANT CHANGE UP (ref 10–40)
BASOPHILS # BLD AUTO: 0.03 K/UL — SIGNIFICANT CHANGE UP (ref 0–0.2)
BASOPHILS NFR BLD AUTO: 0.3 % — SIGNIFICANT CHANGE UP (ref 0–2)
BILIRUB SERPL-MCNC: 0.4 MG/DL — SIGNIFICANT CHANGE UP (ref 0.2–1.2)
BUN SERPL-MCNC: 13 MG/DL — SIGNIFICANT CHANGE UP (ref 7–18)
CALCIUM SERPL-MCNC: 8.3 MG/DL — LOW (ref 8.4–10.5)
CHLORIDE SERPL-SCNC: 107 MMOL/L — SIGNIFICANT CHANGE UP (ref 96–108)
CO2 SERPL-SCNC: 27 MMOL/L — SIGNIFICANT CHANGE UP (ref 22–31)
CREAT SERPL-MCNC: 0.94 MG/DL — SIGNIFICANT CHANGE UP (ref 0.5–1.3)
D DIMER BLD IA.RAPID-MCNC: <150 NG/ML DDU — SIGNIFICANT CHANGE UP
EGFR: 113 ML/MIN/1.73M2 — SIGNIFICANT CHANGE UP
EOSINOPHIL # BLD AUTO: 0.12 K/UL — SIGNIFICANT CHANGE UP (ref 0–0.5)
EOSINOPHIL NFR BLD AUTO: 1.4 % — SIGNIFICANT CHANGE UP (ref 0–6)
GLUCOSE SERPL-MCNC: 155 MG/DL — HIGH (ref 70–99)
HCT VFR BLD CALC: 43.1 % — SIGNIFICANT CHANGE UP (ref 39–50)
HGB BLD-MCNC: 14.4 G/DL — SIGNIFICANT CHANGE UP (ref 13–17)
IMM GRANULOCYTES NFR BLD AUTO: 0.2 % — SIGNIFICANT CHANGE UP (ref 0–0.9)
LIDOCAIN IGE QN: 188 U/L — SIGNIFICANT CHANGE UP (ref 73–393)
LYMPHOCYTES # BLD AUTO: 2.2 K/UL — SIGNIFICANT CHANGE UP (ref 1–3.3)
LYMPHOCYTES # BLD AUTO: 24.8 % — SIGNIFICANT CHANGE UP (ref 13–44)
MCHC RBC-ENTMCNC: 28.8 PG — SIGNIFICANT CHANGE UP (ref 27–34)
MCHC RBC-ENTMCNC: 33.4 GM/DL — SIGNIFICANT CHANGE UP (ref 32–36)
MCV RBC AUTO: 86.2 FL — SIGNIFICANT CHANGE UP (ref 80–100)
MONOCYTES # BLD AUTO: 0.97 K/UL — HIGH (ref 0–0.9)
MONOCYTES NFR BLD AUTO: 10.9 % — SIGNIFICANT CHANGE UP (ref 2–14)
NEUTROPHILS # BLD AUTO: 5.53 K/UL — SIGNIFICANT CHANGE UP (ref 1.8–7.4)
NEUTROPHILS NFR BLD AUTO: 62.4 % — SIGNIFICANT CHANGE UP (ref 43–77)
NRBC # BLD: 0 /100 WBCS — SIGNIFICANT CHANGE UP (ref 0–0)
PLATELET # BLD AUTO: 201 K/UL — SIGNIFICANT CHANGE UP (ref 150–400)
POTASSIUM SERPL-MCNC: 3.3 MMOL/L — LOW (ref 3.5–5.3)
POTASSIUM SERPL-SCNC: 3.3 MMOL/L — LOW (ref 3.5–5.3)
PROT SERPL-MCNC: 6.6 G/DL — SIGNIFICANT CHANGE UP (ref 6–8.3)
RBC # BLD: 5 M/UL — SIGNIFICANT CHANGE UP (ref 4.2–5.8)
RBC # FLD: 11.6 % — SIGNIFICANT CHANGE UP (ref 10.3–14.5)
SODIUM SERPL-SCNC: 138 MMOL/L — SIGNIFICANT CHANGE UP (ref 135–145)
TROPONIN I, HIGH SENSITIVITY RESULT: 5.5 NG/L — SIGNIFICANT CHANGE UP
WBC # BLD: 8.87 K/UL — SIGNIFICANT CHANGE UP (ref 3.8–10.5)
WBC # FLD AUTO: 8.87 K/UL — SIGNIFICANT CHANGE UP (ref 3.8–10.5)

## 2023-08-07 PROCEDURE — 93010 ELECTROCARDIOGRAM REPORT: CPT

## 2023-08-07 PROCEDURE — 99285 EMERGENCY DEPT VISIT HI MDM: CPT

## 2023-08-07 PROCEDURE — 71045 X-RAY EXAM CHEST 1 VIEW: CPT | Mod: 26

## 2023-08-07 RX ORDER — FAMOTIDINE 10 MG/ML
20 INJECTION INTRAVENOUS ONCE
Refills: 0 | Status: COMPLETED | OUTPATIENT
Start: 2023-08-07 | End: 2023-08-07

## 2023-08-07 RX ORDER — ONDANSETRON 8 MG/1
4 TABLET, FILM COATED ORAL ONCE
Refills: 0 | Status: COMPLETED | OUTPATIENT
Start: 2023-08-07 | End: 2023-08-07

## 2023-08-07 RX ORDER — PANTOPRAZOLE SODIUM 20 MG/1
40 TABLET, DELAYED RELEASE ORAL ONCE
Refills: 0 | Status: COMPLETED | OUTPATIENT
Start: 2023-08-07 | End: 2023-08-07

## 2023-08-07 RX ADMIN — ONDANSETRON 4 MILLIGRAM(S): 8 TABLET, FILM COATED ORAL at 23:11

## 2023-08-07 RX ADMIN — FAMOTIDINE 20 MILLIGRAM(S): 10 INJECTION INTRAVENOUS at 23:11

## 2023-08-07 NOTE — ED PROVIDER NOTE - NSFOLLOWUPCLINICS_GEN_ALL_ED_FT
[facility]  Gastroenterology  [address] [place] [address]  Phone: [phone]  Fax: [phone]    [facility]  Internal Medicine  [address] [place] [address]  Phone: [phone]  Fax: [phone]

## 2023-08-07 NOTE — ED PROVIDER NOTE - PATIENT PORTAL LINK FT
You can access the FollowMyHealth Patient Portal offered by [facility] by registering at the following website: [email or web address]. By joining [employer]’s FollowMyHealth portal, you will also be able to view your health information using other applications (apps) compatible with our system.

## 2023-08-07 NOTE — ED PROVIDER NOTE - PHYSICAL EXAMINATION
No distress  No icterus, no jaundice, no guarding to full palpation of abdomen.   No calf tenderness

## 2023-08-07 NOTE — ED PROVIDER NOTE - CLINICAL SUMMARY MEDICAL DECISION MAKING FREE TEXT BOX
ACS ruled out as EKG has no ischemic changes or evidence of STEMI, high-sensitivity troponin is negative and patient has no cardiac risk factors.  PE ruled out as D-dimer is negative and patient has no thromboembolism risk factors.  Chest x-ray shows no pneumothorax or pulmonary pathology.  Pt is well appearing, has no new complaints and able to walk with normal gait. Pt is stable for discharge and follow up with medical doctor. Pt educated on care and need for follow up. Discussed anticipatory guidance and return precautions. Questions answered. I had a detailed discussion with the patient regarding the historical points, exam findings, and any diagnostic results supporting the discharge diagnosis.

## 2023-08-07 NOTE — ED PROVIDER NOTE - NSFOLLOWUPINSTRUCTIONS_ED_ALL_ED_FT
Return to ER immediately if your chest pain returns, if you have pain with radiation to arms, back or neck, abdomen, if you feel short of breath, feel weak, feel fast or pounding heart beating, if you have any fever or vomiting.  If you need assistance with follow up appointments our Care Coordinator can be reached at [phone]. In addition the [facility] is located at [address] [place] [address], tel: [phone].

## 2023-08-07 NOTE — ED PROVIDER NOTE - NSICDXPASTMEDICALHX_GEN_ALL_CORE_FT
PAST MEDICAL HISTORY:  GERD (gastroesophageal reflux disease)      PAST MEDICAL HISTORY:  Gastritis

## 2023-08-07 NOTE — ED PROVIDER NOTE - OBJECTIVE STATEMENT
27-year-old male chief complaint of burning sensation to chest area which patient attributes to his reflux/gastritis.  Patient currently on omeprazole and metoclopramide.  Patient denies shortness of breath, no fever, no coughing.  Patient denies any abdominal pain. 27-year-old male chief complaint of burning sensation to chest area which patient attributes to his  gastritis.  Patient currently on omeprazole and metoclopramide.  Patient denies shortness of breath, no fever, no coughing.  Patient denies any abdominal pain.

## 2023-08-08 VITALS
OXYGEN SATURATION: 99 % | TEMPERATURE: 98 F | SYSTOLIC BLOOD PRESSURE: 110 MMHG | HEART RATE: 87 BPM | RESPIRATION RATE: 18 BRPM | DIASTOLIC BLOOD PRESSURE: 65 MMHG

## 2023-08-08 PROCEDURE — 93005 ELECTROCARDIOGRAM TRACING: CPT

## 2023-08-08 PROCEDURE — 83690 ASSAY OF LIPASE: CPT

## 2023-08-08 PROCEDURE — 84484 ASSAY OF TROPONIN QUANT: CPT

## 2023-08-08 PROCEDURE — 80053 COMPREHEN METABOLIC PANEL: CPT

## 2023-08-08 PROCEDURE — 85025 COMPLETE CBC W/AUTO DIFF WBC: CPT

## 2023-08-08 PROCEDURE — 85379 FIBRIN DEGRADATION QUANT: CPT

## 2023-08-08 PROCEDURE — 96374 THER/PROPH/DIAG INJ IV PUSH: CPT

## 2023-08-08 PROCEDURE — 99285 EMERGENCY DEPT VISIT HI MDM: CPT | Mod: 25

## 2023-08-08 PROCEDURE — 96375 TX/PRO/DX INJ NEW DRUG ADDON: CPT

## 2023-08-08 PROCEDURE — 71045 X-RAY EXAM CHEST 1 VIEW: CPT

## 2023-08-08 PROCEDURE — 36415 COLL VENOUS BLD VENIPUNCTURE: CPT

## 2023-08-08 RX ORDER — OMEPRAZOLE 10 MG/1
1 CAPSULE, DELAYED RELEASE ORAL
Qty: 30 | Refills: 0
Start: 2023-08-08

## 2023-08-08 RX ORDER — POTASSIUM CHLORIDE 20 MEQ
40 PACKET (EA) ORAL ONCE
Refills: 0 | Status: COMPLETED | OUTPATIENT
Start: 2023-08-08 | End: 2023-08-08

## 2023-08-08 RX ADMIN — Medication 40 MILLIEQUIVALENT(S): at 00:45

## 2023-08-08 RX ADMIN — Medication 30 MILLILITER(S): at 00:45

## 2023-08-08 RX ADMIN — PANTOPRAZOLE SODIUM 40 MILLIGRAM(S): 20 TABLET, DELAYED RELEASE ORAL at 01:00

## 2023-08-08 NOTE — ED ADULT NURSE NOTE - NSFALLUNIVINTERV_ED_ALL_ED
Bed/Stretcher in lowest position, wheels locked, appropriate side rails in place/Call bell, personal items and telephone in reach/Instruct patient to call for assistance before getting out of bed/chair/stretcher/Non-slip footwear applied when patient is off stretcher/[place] to call system/Physically safe environment - no spills, clutter or unnecessary equipment/Purposeful proactive rounding/Room/bathroom lighting operational, light cord in reach

## 2023-08-08 NOTE — ED ADULT NURSE NOTE - OBJECTIVE STATEMENT
the patient  is a 27  y male  complaining  of chest pain ,vomiting and nausea diarrhea and back pain

## 2023-08-08 NOTE — ED ADULT NURSE NOTE - CAS DISCH TRANSFER METHOD
Patient called to report that he fell down 25 stairs June 1st and hit his head really hard. Since that time he has been feeling off balance and has fallen two more times.    Will route to covering provider to advise.    Kacie ADAMN, RN     Walking

## 2023-08-30 ENCOUNTER — EMERGENCY (EMERGENCY)
Facility: HOSPITAL | Age: 28
LOS: 1 days | Discharge: HOME | End: 2023-08-30
Attending: STUDENT IN AN ORGANIZED HEALTH CARE EDUCATION/TRAINING PROGRAM
Payer: SELF-PAY

## 2023-08-30 VITALS
TEMPERATURE: 98 F | DIASTOLIC BLOOD PRESSURE: 85 MMHG | OXYGEN SATURATION: 100 % | WEIGHT: 164.69 LBS | HEART RATE: 65 BPM | HEIGHT: 68 IN | RESPIRATION RATE: 20 BRPM | SYSTOLIC BLOOD PRESSURE: 125 MMHG

## 2023-08-30 PROCEDURE — 99284 EMERGENCY DEPT VISIT MOD MDM: CPT

## 2023-08-30 PROCEDURE — 96372 THER/PROPH/DIAG INJ SC/IM: CPT

## 2023-08-30 PROCEDURE — 99284 EMERGENCY DEPT VISIT MOD MDM: CPT | Mod: 25

## 2023-08-30 RX ORDER — CYCLOBENZAPRINE HYDROCHLORIDE 10 MG/1
1 TABLET, FILM COATED ORAL
Qty: 9 | Refills: 0
Start: 2023-08-30 | End: 2023-09-01

## 2023-08-30 RX ORDER — KETOROLAC TROMETHAMINE 30 MG/ML
30 SYRINGE (ML) INJECTION ONCE
Refills: 0 | Status: DISCONTINUED | OUTPATIENT
Start: 2023-08-30 | End: 2023-08-30

## 2023-08-30 RX ORDER — LIDOCAINE 4 G/100G
1 CREAM TOPICAL ONCE
Refills: 0 | Status: COMPLETED | OUTPATIENT
Start: 2023-08-30 | End: 2023-08-30

## 2023-08-30 RX ADMIN — Medication 30 MILLIGRAM(S): at 19:23

## 2023-08-30 RX ADMIN — LIDOCAINE 1 PATCH: 4 CREAM TOPICAL at 19:23

## 2023-08-30 RX ADMIN — Medication 30 MILLIGRAM(S): at 20:29

## 2023-08-30 NOTE — ED PROVIDER NOTE - PATIENT PORTAL LINK FT
You can access the FollowMyHealth Patient Portal offered by [facility] by registering at the following website: [email or web address]. By joining [employer]’s FollowMyHealth portal, you will also be able to view your health information using other applications (apps) compatible with our system.
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
## 2023-08-30 NOTE — ED PROVIDER NOTE - PHYSICAL EXAMINATION
Gen: NAD, AOx3, able to make needs known, non-toxic  Head: NCAT  HEENT: EOMI, oral mucosa moist, normal conjunctiva  Lung: CTAB, no respiratory distress, no wheezes/rhonchi/rales B/L, speaking in full sentences  CV: RRR, no murmurs  Abd: non distended, soft, nontender, no guarding, no seatbelt sign, no CVA tenderness  MSK: + thoracic and lumbar paraspinal tenderness, no midline ttp, no visible deformities, 5/5 strength in upper and lower extremities  Neuro: Appears non focal  Skin: Warm, well perfused, no rash  Psych: normal affect

## 2023-08-30 NOTE — ED ADULT NURSE NOTE - CAS EDP DISCH TYPE
Home
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
## 2023-08-30 NOTE — ED PROVIDER NOTE - NS ED ROS FT
GENERAL: No fever or chills  EYES: No change in vision  HEENT: No trouble swallowing or speaking  CARDIAC: No chest pain  PULMONARY: No cough or SOB  GI: No abdominal pain, no nausea or no vomiting, no diarrhea or constipation  : No changes in urination  SKIN: No rashes  NEURO: No headache, no numbness  MSK: +left arm, left sided neck, back pain  Otherwise as HPI or negative.

## 2023-08-30 NOTE — ED PROVIDER NOTE - ATTENDING APP SHARED VISIT CONTRIBUTION OF CARE
I, Judy Orr DO reviewed the LATISHA plan of care and discussed the case with the ACP.  I was available for any questions and concerns
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
## 2023-08-31 PROBLEM — K29.70 GASTRITIS, UNSPECIFIED, WITHOUT BLEEDING: Chronic | Status: ACTIVE | Noted: 2023-08-08

## 2023-12-14 ENCOUNTER — EMERGENCY (EMERGENCY)
Facility: HOSPITAL | Age: 28
LOS: 1 days | Discharge: HOME | End: 2023-12-14
Attending: EMERGENCY MEDICINE
Payer: SELF-PAY

## 2023-12-14 VITALS
OXYGEN SATURATION: 97 % | HEART RATE: 80 BPM | TEMPERATURE: 98 F | RESPIRATION RATE: 18 BRPM | DIASTOLIC BLOOD PRESSURE: 74 MMHG | WEIGHT: 164.91 LBS | SYSTOLIC BLOOD PRESSURE: 114 MMHG

## 2023-12-14 VITALS
RESPIRATION RATE: 18 BRPM | OXYGEN SATURATION: 97 % | TEMPERATURE: 98 F | SYSTOLIC BLOOD PRESSURE: 122 MMHG | DIASTOLIC BLOOD PRESSURE: 79 MMHG | HEART RATE: 71 BPM

## 2023-12-14 PROCEDURE — 72125 CT NECK SPINE W/O DYE: CPT | Mod: 26,MA

## 2023-12-14 PROCEDURE — 99284 EMERGENCY DEPT VISIT MOD MDM: CPT | Mod: 25

## 2023-12-14 PROCEDURE — 72125 CT NECK SPINE W/O DYE: CPT | Mod: MA

## 2023-12-14 PROCEDURE — 70450 CT HEAD/BRAIN W/O DYE: CPT | Mod: 26,MA

## 2023-12-14 PROCEDURE — 99284 EMERGENCY DEPT VISIT MOD MDM: CPT

## 2023-12-14 PROCEDURE — 70450 CT HEAD/BRAIN W/O DYE: CPT | Mod: MA

## 2023-12-14 RX ORDER — ACETAMINOPHEN 500 MG
650 TABLET ORAL ONCE
Refills: 0 | Status: COMPLETED | OUTPATIENT
Start: 2023-12-14 | End: 2023-12-14

## 2023-12-14 RX ORDER — LIDOCAINE 4 G/100G
1 CREAM TOPICAL
Qty: 1 | Refills: 0
Start: 2023-12-14 | End: 2023-12-18

## 2023-12-14 RX ORDER — CYCLOBENZAPRINE HYDROCHLORIDE 10 MG/1
5 TABLET, FILM COATED ORAL ONCE
Refills: 0 | Status: COMPLETED | OUTPATIENT
Start: 2023-12-14 | End: 2023-12-14

## 2023-12-14 RX ORDER — IBUPROFEN 200 MG
1 TABLET ORAL
Qty: 12 | Refills: 0
Start: 2023-12-14 | End: 2023-12-16

## 2023-12-14 RX ORDER — DIAZEPAM 5 MG
1 TABLET ORAL
Qty: 9 | Refills: 0
Start: 2023-12-14 | End: 2023-12-16

## 2023-12-14 RX ORDER — LIDOCAINE 4 G/100G
1 CREAM TOPICAL ONCE
Refills: 0 | Status: COMPLETED | OUTPATIENT
Start: 2023-12-14 | End: 2023-12-14

## 2023-12-14 RX ORDER — IBUPROFEN 200 MG
400 TABLET ORAL ONCE
Refills: 0 | Status: COMPLETED | OUTPATIENT
Start: 2023-12-14 | End: 2023-12-14

## 2023-12-14 RX ADMIN — LIDOCAINE 1 PATCH: 4 CREAM TOPICAL at 17:00

## 2023-12-14 RX ADMIN — Medication 650 MILLIGRAM(S): at 15:21

## 2023-12-14 RX ADMIN — CYCLOBENZAPRINE HYDROCHLORIDE 5 MILLIGRAM(S): 10 TABLET, FILM COATED ORAL at 15:21

## 2023-12-14 RX ADMIN — Medication 400 MILLIGRAM(S): at 17:30

## 2023-12-14 RX ADMIN — Medication 400 MILLIGRAM(S): at 17:00

## 2023-12-14 NOTE — ED PROVIDER NOTE - PROVIDER TOKENS
PROVIDER:[TOKEN:[[id number]]],PROVIDER:[TOKEN:[[id number]]] PROVIDER:[TOKEN:[[id number]]],PROVIDER:[TOKEN:[[id number]]]

## 2023-12-14 NOTE — ED PROVIDER NOTE - CARE PROVIDERS DIRECT ADDRESSES
[email or web address],DirectAddress_Unknown [email or web address],DirectAddress_Unknown

## 2023-12-14 NOTE — ED PROVIDER NOTE - PROGRESS NOTE DETAILS
Patient nontoxic and medically stable for discharge. Patient reports starting to feel better and requesting discharge. Results discussed with patient. C-collar cleared, soft collar placed for comfort. Return precautions provided and patient understands to return to the ED for concerning or worsening signs and symptoms. Instructed to follow up with pcp and surgeon who performed initial neck surgery and agreeable. Patient's questions answered.
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
## 2023-12-14 NOTE — ED ADULT NURSE NOTE - NSFALLUNIVINTERV_ED_ALL_ED
Bed/Stretcher in lowest position, wheels locked, appropriate side rails in place/Call bell, personal items and telephone in reach/Instruct patient to call for assistance before getting out of bed/chair/stretcher/Non-slip footwear applied when patient is off stretcher/[place] to call system/Physically safe environment - no spills, clutter or unnecessary equipment/Purposeful proactive rounding/Room/bathroom lighting operational, light cord in reach Bed/Stretcher in lowest position, wheels locked, appropriate side rails in place/Call bell, personal items and telephone in reach/Instruct patient to call for assistance before getting out of bed/chair/stretcher/Non-slip footwear applied when patient is off stretcher/[place] to call system/Physically safe environment - no spills, clutter or unnecessary equipment/Purposeful proactive rounding/Room/bathroom lighting operational, light cord in reach

## 2023-12-14 NOTE — ED ADULT TRIAGE NOTE - HEART RATE (BEATS/MIN)
80 Post-Care Instructions: I reviewed with the patient in detail post-care instructions. Patient is not to engage in any heavy lifting, exercise, or swimming for the next 14 days. Should the patient develop any fevers, chills, bleeding, severe pain patient will contact the office immediately.
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
## 2023-12-14 NOTE — ED PROVIDER NOTE - PHYSICAL EXAMINATION
GEN: Awake, alert, interactive, NAD.  HEAD AND NECK: NC/AT. Airway patent. Neck supple.  Posterior neck nonfocal ttp.  EYES:  Clear b/l. EOMI. PERRL.   ENT: Moist mucus membranes.   CARDIAC: Regular rate, regular rhythm. No evident pedal edema.    RESP/CHEST: Normal respiratory effort with no use of accessory muscles or retractions. Clear throughout on auscultation. No chest wall TTP.   ABD: Soft, non-distended, non-tender. No rebound, no guarding.   BACK: No midline T / L  spinal TTP, step-offs or deformities. No CVAT.   EXTREMITIES: Moving all extremities with no apparent deformities. No TTP BL clavicles / shoulders / elbows / wrists / hips / knees / ankles.   SKIN: Warm, dry, intact normal color. No rash. Negative seat belt sign.   NEURO: AOx3, CN II-XII grossly intact, no focal deficits. Able to ambulate with steady gait.  PSYCH: Appropriate mood and affect.

## 2023-12-14 NOTE — ED PROVIDER NOTE - CARE PROVIDER_API CALL
Juan Carlos Jesus)  Orthopaedic Surgery  [address], [address]  [place] [address]  Phone: [phone]  Fax: [phone]  Follow Up Time:     Leandro Gallagher  Neurosurgery  [address]  [place] [address]  Phone: [phone]  Fax: [phone]  Follow Up Time:    Juan Carlos Jesus)  Orthopaedic Surgery  [address], [address]  [place] [address]  Phone: [phone]  Fax: [phone]  Follow Up Time:     Leandro Gallagher  Neurosurgery  [address]  [place] [address]  Phone: [phone]  Fax: [phone]  Follow Up Time:

## 2023-12-14 NOTE — ED PROVIDER NOTE - PATIENT PORTAL LINK FT
You can access the FollowMyHealth Patient Portal offered by [facility] by registering at the following website: [email or web address]. By joining [employer]’s FollowMyHealth portal, you will also be able to view your health information using other applications (apps) compatible with our system. You can access the FollowMyHealth Patient Portal offered by [facility] by registering at the following website: [email or web address]. By joining [employer]’s FollowMyHealth portal, you will also be able to view your health information using other applications (apps) compatible with our system.
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
## 2024-01-01 NOTE — ED PROVIDER NOTE - ENMT, MLM
Airway patent, Nasal mucosa clear. Mouth with normal mucosa. Throat has no vesicles, no oropharyngeal exudates and uvula is midline.
.

## 2024-05-22 NOTE — ED ADULT TRIAGE NOTE - GLASGOW COMA SCALE: EYE OPENING, MLM
Goal Outcome Evaluation:  Plan of Care Reviewed With: patient  Patient Agreement with Plan of Care: agrees     Progress: improving  Outcome Evaluation: Pt is confused at times but easily redirected. Pt reports good sleep and a good appetite. Pt rates dep and anx 2/2. Pt denies SI/HI/AVH at this time. Pt has been asleep for most of the day. Pt has been incontinent two times today, reporting that he could not get up fast enought. Pt is now wearing a brief.                                (E4) spontaneous
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
## 2025-03-24 NOTE — SWALLOW BEDSIDE ASSESSMENT ADULT - SPECIFY REASON(S)
Ambulated pt with pulse ox and walker. Pt ambulated independently with walker. Desaturated to 89% during a turn otherwise remained 92-95% during walking. Provider updated.   
to assess swallow function
to assess swallow function
to reassess swallow function

## 2025-05-25 ENCOUNTER — EMERGENCY (EMERGENCY)
Facility: HOSPITAL | Age: 30
LOS: 1 days | End: 2025-05-25
Attending: STUDENT IN AN ORGANIZED HEALTH CARE EDUCATION/TRAINING PROGRAM
Payer: SELF-PAY

## 2025-05-25 VITALS
SYSTOLIC BLOOD PRESSURE: 149 MMHG | WEIGHT: 164.91 LBS | DIASTOLIC BLOOD PRESSURE: 84 MMHG | HEART RATE: 100 BPM | HEIGHT: 68 IN | RESPIRATION RATE: 18 BRPM | OXYGEN SATURATION: 98 % | TEMPERATURE: 97 F

## 2025-05-25 PROCEDURE — 73130 X-RAY EXAM OF HAND: CPT | Mod: 26,LT

## 2025-05-25 PROCEDURE — 73610 X-RAY EXAM OF ANKLE: CPT | Mod: 26,RT

## 2025-05-25 PROCEDURE — 73610 X-RAY EXAM OF ANKLE: CPT

## 2025-05-25 PROCEDURE — 99283 EMERGENCY DEPT VISIT LOW MDM: CPT

## 2025-05-25 PROCEDURE — 99284 EMERGENCY DEPT VISIT MOD MDM: CPT | Mod: 25

## 2025-05-25 PROCEDURE — 73130 X-RAY EXAM OF HAND: CPT

## 2025-05-25 PROCEDURE — 99053 MED SERV 10PM-8AM 24 HR FAC: CPT

## 2025-05-25 RX ORDER — ACETAMINOPHEN 500 MG/5ML
975 LIQUID (ML) ORAL ONCE
Refills: 0 | Status: COMPLETED | OUTPATIENT
Start: 2025-05-25 | End: 2025-05-25

## 2025-05-25 RX ORDER — NAPROXEN SODIUM 275 MG
500 TABLET ORAL ONCE
Refills: 0 | Status: COMPLETED | OUTPATIENT
Start: 2025-05-25 | End: 2025-05-25

## 2025-05-25 RX ADMIN — Medication 500 MILLIGRAM(S): at 01:56

## 2025-05-25 RX ADMIN — Medication 975 MILLIGRAM(S): at 01:56

## 2025-05-25 NOTE — ED PROVIDER NOTE - DISCHARGE DATE
Spoke with patient about arrival time @ 3367.   Covid test = Rapid    NPO status reviewed:  Full liquids Tuesday, 04/19. Clear liquids Wednesday, 04/20. Patient must have nothing to eat after midnight.      Medications: Do not take Insulin or oral diabetic medications the day of the procedure.  Take as prescribed: heart, seizure and blood pressure medication in the morning with a sip of water (less than an ounce).  Take any breathing medications and bring inhalers to hospital with you Leave all valuables and jewelry at home.     Wear comfortable clothes to procedure to change into hospital gown You cannot drive for 24 hours after your procedure because you will receive sedation for your procedure to make you comfortable.  A ride must be provided at discharge.                  
25-May-2025

## 2025-05-25 NOTE — ED PROVIDER NOTE - PHYSICAL EXAMINATION
GENERAL: no acute distress; well-developed  HEAD:  Atraumatic, Normocephalic  EYES: EOMI, PERRLA, conjunctiva and sclera clear  ENT: MMM; oropharynx clear  NECK: Supple, No JVD  CHEST/LUNG: Clear to auscultation bilaterally; No wheeze  HEART: Regular rate and rhythm; No murmurs, rubs, or gallops  ABDOMEN: Soft, Nontender, Nondistended; Bowel sounds present  EXTREMITIES:  2+ Peripheral Pulses, No gross deformity of R ankle, mild ttp.   PSYCH: AAOx3  NEUROLOGY: no focal motor or sensory deficits. 5/5 muscle strength in all extremities.   SKIN: No rashes or lesions

## 2025-05-25 NOTE — ED PROVIDER NOTE - CLINICAL SUMMARY MEDICAL DECISION MAKING FREE TEXT BOX
R ankle pain s/p fall  Suspect sprain   r/o Fracture  Likely ACE warp   Elevate   NSAIDs  Orthopedic follow up PRN.

## 2025-05-25 NOTE — ED ADULT TRIAGE NOTE - CHIEF COMPLAINT QUOTE
pt stated he fell down stairs twisting his right ankle yesterday denies hitting his head or being on blood thinners

## 2025-05-25 NOTE — ED PROVIDER NOTE - PATIENT PORTAL LINK FT
You can access the FollowMyHealth Patient Portal offered by [facility] by registering at the following website: [email or web address]. By joining [employer]’s FollowMyHealth portal, you will also be able to view your health information using other applications (apps) compatible with our system.

## 2025-05-25 NOTE — ED PROVIDER NOTE - NSFOLLOWUPINSTRUCTIONS_ED_ALL_ED_FT
Please keep Ace on to support your ankle   Take Tylenol 650 mg every 6-8 hours or Motrin 400-600 mg every 6-8 hours as need for pain  Follow up with orthopedist.   Seek Medical attention or return to the emergency department for any new or worsening symptoms.

## 2025-05-25 NOTE — ED ADULT NURSE NOTE - NSFALLHARMRISKINTERV_ED_ALL_ED
pt A7Ox3 and refused fall bundle/Communicate risk of Fall with Harm to all staff, patient, and family/Provide visual cue: red socks, yellow wristband, yellow gown, etc/Reinforce activity limits and safety measures with patient and family/Bed in lowest position, wheels locked, appropriate side rails in place/Call bell, personal items and telephone in reach/Instruct patient to call for assistance before getting out of bed/chair/stretcher/Non-slip footwear applied when patient is off stretcher/[place] to call system/Physically safe environment - no spills, clutter or unnecessary equipment/Purposeful Proactive Rounding/Room/bathroom lighting operational, light cord in reach

## 2025-05-25 NOTE — ED ADULT NURSE NOTE - NS TRANSFER PATIENT BELONGINGS
[General Appearance - Alert] : alert [General Appearance - In No Acute Distress] : in no acute distress [Sclera] : the sclera and conjunctiva were normal [PERRL With Normal Accommodation] : pupils were equal in size, round, and reactive to light [Extraocular Movements] : extraocular movements were intact [Outer Ear] : the ears and nose were normal in appearance [Oropharynx] : the oropharynx was normal [Neck Appearance] : the appearance of the neck was normal [Neck Cervical Mass (___cm)] : no neck mass was observed [Jugular Venous Distention Increased] : there was no jugular-venous distention [Respiration, Rhythm And Depth] : normal respiratory rhythm and effort [Exaggerated Use Of Accessory Muscles For Inspiration] : no accessory muscle use [Auscultation Breath Sounds / Voice Sounds] : lungs were clear to auscultation bilaterally [Heart Rate And Rhythm] : heart rate was normal and rhythm regular [Heart Sounds] : normal S1 and S2 [Heart Sounds Gallop] : no gallops [Murmurs] : no murmurs [Heart Sounds Pericardial Friction Rub] : no pericardial rub [Edema] : there was no peripheral edema [Veins - Varicosity Changes] : there were no varicosital changes [Bowel Sounds] : normal bowel sounds [Abdomen Soft] : soft [Abdomen Tenderness] : non-tender [Abdomen Mass (___ Cm)] : no abdominal mass palpated [No CVA Tenderness] : no ~M costovertebral angle tenderness [No Spinal Tenderness] : no spinal tenderness [Abnormal Walk] : normal gait [Involuntary Movements] : no involuntary movements were seen [Skin Color & Pigmentation] : normal skin color and pigmentation [Skin Turgor] : normal skin turgor [] : no rash [Cranial Nerves] : cranial nerves 2-12 were intact [No Focal Deficits] : no focal deficits [Affect] : the affect was normal [Mood] : the mood was normal Clothing

## 2025-05-25 NOTE — ED PROVIDER NOTE - OBJECTIVE STATEMENT
30 y/o M PMH DM2, HLD who presents with R ankle pain s/p fall down stairs     Reports pain and lateral and medial side. No other injuries. Took ibuprofen prior to arrival JACK

## 2025-05-25 NOTE — ED PROVIDER NOTE - NSFOLLOWUPCLINICS_GEN_ALL_ED_FT
[facility]  Orthopedics  [address] [place] [address]  Phone: [phone]  Fax: [phone]

## 2025-05-30 NOTE — ED PROVIDER NOTE - CROS ED GI ALL NEG
Please call patient to verify his appointment with me in August (does not prefer texts), thank you!   
negative...

## 2025-06-02 ENCOUNTER — APPOINTMENT (OUTPATIENT)
Dept: ORTHOPEDIC SURGERY | Facility: CLINIC | Age: 30
End: 2025-06-02
Payer: OTHER MISCELLANEOUS

## 2025-06-02 ENCOUNTER — TRANSCRIPTION ENCOUNTER (OUTPATIENT)
Age: 30
End: 2025-06-02

## 2025-06-02 DIAGNOSIS — S93.401A SPRAIN OF UNSPECIFIED LIGAMENT OF RIGHT ANKLE, INITIAL ENCOUNTER: ICD-10-CM

## 2025-06-02 DIAGNOSIS — E11.9 TYPE 2 DIABETES MELLITUS W/OUT COMPLICATIONS: ICD-10-CM

## 2025-06-02 DIAGNOSIS — E78.00 PURE HYPERCHOLESTEROLEMIA, UNSPECIFIED: ICD-10-CM

## 2025-06-02 PROCEDURE — 99204 OFFICE O/P NEW MOD 45 MIN: CPT

## 2025-06-02 PROCEDURE — L1902: CPT | Mod: RT

## 2025-06-02 RX ORDER — MELOXICAM 15 MG/1
15 TABLET ORAL DAILY
Qty: 30 | Refills: 1 | Status: ACTIVE | COMMUNITY
Start: 2025-06-02 | End: 2025-08-01

## 2025-06-02 RX ORDER — GLIPIZIDE 2.5 MG/1
TABLET ORAL
Refills: 0 | Status: ACTIVE | COMMUNITY

## 2025-06-02 RX ORDER — METFORMIN HYDROCHLORIDE 750 MG/1
TABLET ORAL
Refills: 0 | Status: ACTIVE | COMMUNITY

## 2025-06-02 RX ORDER — ATORVASTATIN CALCIUM 80 MG/1
TABLET, FILM COATED ORAL
Refills: 0 | Status: ACTIVE | COMMUNITY

## 2025-06-16 ENCOUNTER — APPOINTMENT (OUTPATIENT)
Dept: ORTHOPEDIC SURGERY | Facility: CLINIC | Age: 30
End: 2025-06-16
Payer: OTHER MISCELLANEOUS

## 2025-06-16 PROCEDURE — 99213 OFFICE O/P EST LOW 20 MIN: CPT

## 2025-06-30 ENCOUNTER — NON-APPOINTMENT (OUTPATIENT)
Age: 30
End: 2025-06-30

## 2025-06-30 ENCOUNTER — APPOINTMENT (OUTPATIENT)
Dept: ORTHOPEDIC SURGERY | Facility: CLINIC | Age: 30
End: 2025-06-30
Payer: OTHER MISCELLANEOUS

## 2025-06-30 PROCEDURE — 99213 OFFICE O/P EST LOW 20 MIN: CPT

## 2025-07-15 NOTE — ED ADULT NURSE NOTE - NS ED NURSE DISCH DISPOSITION
FHT check  Last examined at 0600 Dr. Reeder 3/50/-3 @0604 provider at bedside for SVE and ROM Patient reporting discomfort w/ ctx Admitted

## 2025-07-21 ENCOUNTER — NON-APPOINTMENT (OUTPATIENT)
Age: 30
End: 2025-07-21

## 2025-07-21 ENCOUNTER — APPOINTMENT (OUTPATIENT)
Dept: ORTHOPEDIC SURGERY | Facility: CLINIC | Age: 30
End: 2025-07-21
Payer: OTHER MISCELLANEOUS

## 2025-07-21 DIAGNOSIS — S93.401D SPRAIN OF UNSPECIFIED LIGAMENT OF RIGHT ANKLE, SUBSEQUENT ENCOUNTER: ICD-10-CM

## 2025-07-21 PROCEDURE — 99213 OFFICE O/P EST LOW 20 MIN: CPT

## 2025-08-25 ENCOUNTER — APPOINTMENT (OUTPATIENT)
Dept: ORTHOPEDIC SURGERY | Facility: CLINIC | Age: 30
End: 2025-08-25
Payer: OTHER MISCELLANEOUS

## 2025-08-25 DIAGNOSIS — S93.401D SPRAIN OF UNSPECIFIED LIGAMENT OF RIGHT ANKLE, SUBSEQUENT ENCOUNTER: ICD-10-CM

## 2025-08-25 PROCEDURE — 99214 OFFICE O/P EST MOD 30 MIN: CPT

## 2025-08-27 ENCOUNTER — APPOINTMENT (OUTPATIENT)
Dept: MRI IMAGING | Facility: CLINIC | Age: 30
End: 2025-08-27
Payer: OTHER MISCELLANEOUS

## 2025-08-27 PROCEDURE — 73721 MRI JNT OF LWR EXTRE W/O DYE: CPT | Mod: RT

## 2025-09-08 ENCOUNTER — APPOINTMENT (OUTPATIENT)
Dept: ORTHOPEDIC SURGERY | Facility: CLINIC | Age: 30
End: 2025-09-08

## 2025-09-08 ENCOUNTER — NON-APPOINTMENT (OUTPATIENT)
Age: 30
End: 2025-09-08

## 2025-09-08 DIAGNOSIS — S92.024A NONDISPLACED FRACTURE OF ANTERIOR PROCESS OF RIGHT CALCANEUS, INITIAL ENCOUNTER FOR CLOSED FRACTURE: ICD-10-CM

## 2025-09-08 RX ORDER — MELOXICAM 15 MG/1
15 TABLET ORAL
Qty: 30 | Refills: 1 | Status: ACTIVE | COMMUNITY
Start: 2025-09-08 | End: 1900-01-01